# Patient Record
Sex: FEMALE | Race: WHITE | Employment: FULL TIME | URBAN - NONMETROPOLITAN AREA
[De-identification: names, ages, dates, MRNs, and addresses within clinical notes are randomized per-mention and may not be internally consistent; named-entity substitution may affect disease eponyms.]

---

## 2018-01-24 ENCOUNTER — TELEPHONE (OUTPATIENT)
Dept: FAMILY MEDICINE CLINIC | Age: 38
End: 2018-01-24

## 2018-01-29 ENCOUNTER — OFFICE VISIT (OUTPATIENT)
Dept: FAMILY MEDICINE CLINIC | Age: 38
End: 2018-01-29
Payer: COMMERCIAL

## 2018-01-29 VITALS
RESPIRATION RATE: 12 BRPM | SYSTOLIC BLOOD PRESSURE: 134 MMHG | HEIGHT: 63 IN | WEIGHT: 225 LBS | BODY MASS INDEX: 39.87 KG/M2 | TEMPERATURE: 98.1 F | DIASTOLIC BLOOD PRESSURE: 78 MMHG | HEART RATE: 80 BPM

## 2018-01-29 DIAGNOSIS — R23.2 FACIAL FLUSHING: ICD-10-CM

## 2018-01-29 DIAGNOSIS — M25.552 HIP PAIN, ACUTE, LEFT: Primary | ICD-10-CM

## 2018-01-29 PROCEDURE — 99204 OFFICE O/P NEW MOD 45 MIN: CPT | Performed by: NURSE PRACTITIONER

## 2018-01-29 RX ORDER — IBUPROFEN 800 MG/1
800 TABLET ORAL EVERY 6 HOURS PRN
Qty: 120 TABLET | Refills: 3 | Status: SHIPPED | OUTPATIENT
Start: 2018-01-29 | End: 2018-07-10 | Stop reason: ALTCHOICE

## 2018-01-29 ASSESSMENT — PATIENT HEALTH QUESTIONNAIRE - PHQ9
SUM OF ALL RESPONSES TO PHQ9 QUESTIONS 1 & 2: 0
1. LITTLE INTEREST OR PLEASURE IN DOING THINGS: 0
SUM OF ALL RESPONSES TO PHQ QUESTIONS 1-9: 0
2. FEELING DOWN, DEPRESSED OR HOPELESS: 0

## 2018-01-29 ASSESSMENT — ENCOUNTER SYMPTOMS
SINUS PRESSURE: 0
SINUS PAIN: 0
RESPIRATORY NEGATIVE: 1
ABDOMINAL DISTENTION: 0
ABDOMINAL PAIN: 0
COLOR CHANGE: 1
RHINORRHEA: 0

## 2018-01-29 NOTE — PROGRESS NOTES
Surgical History:   Procedure Laterality Date     SECTION       Family History   Problem Relation Age of Onset    Early Death Neg Hx      Social History   Substance Use Topics    Smoking status: Never Smoker    Smokeless tobacco: Never Used    Alcohol use Yes      Comment: occasional        No Known Allergies    Health Maintenance   Topic Date Due    HIV screen  1997    DTaP/Tdap/Td vaccine (1 - Tdap) 2001    Cervical cancer screen  2003    Flu vaccine (1) 2017       Subjective:      Review of Systems   Constitutional: Negative for chills, fatigue and fever. HENT: Negative for congestion, postnasal drip, rhinorrhea, sinus pain and sinus pressure. Respiratory: Negative. Cardiovascular: Negative. Gastrointestinal: Negative for abdominal distention and abdominal pain. Genitourinary: Negative for difficulty urinating and dysuria. Musculoskeletal: Positive for arthralgias and myalgias. Skin: Positive for color change (facial flushing). Neurological: Positive for headaches. Negative for dizziness and weakness. Psychiatric/Behavioral: Negative for self-injury, sleep disturbance and suicidal ideas. Objective:     /78   Pulse 80   Temp 98.1 °F (36.7 °C) (Oral)   Resp 12   Ht 5' 3\" (1.6 m)   Wt 225 lb (102.1 kg)   LMP 01/15/2018   BMI 39.86 kg/m²     Physical Exam   Constitutional: She is oriented to person, place, and time. She appears well-developed and well-nourished. No distress. overweight   HENT:   Right Ear: Hearing, tympanic membrane, external ear and ear canal normal.   Left Ear: Hearing, tympanic membrane, external ear and ear canal normal.   Nose: Nose normal.   Mouth/Throat: Oropharynx is clear and moist.   No facial flushing noted today, pt denies excessive sweating   Cardiovascular: Normal rate, regular rhythm, normal heart sounds and intact distal pulses. No murmur heard.   Pulmonary/Chest: Effort normal and breath sounds

## 2018-06-04 ENCOUNTER — TELEPHONE (OUTPATIENT)
Dept: FAMILY MEDICINE CLINIC | Age: 38
End: 2018-06-04

## 2018-06-20 ENCOUNTER — TELEPHONE (OUTPATIENT)
Dept: FAMILY MEDICINE CLINIC | Age: 38
End: 2018-06-20

## 2018-07-10 ENCOUNTER — HOSPITAL ENCOUNTER (EMERGENCY)
Age: 38
Discharge: HOME | End: 2018-07-10
Payer: COMMERCIAL

## 2018-07-10 VITALS
HEIGHT: 64 IN | TEMPERATURE: 97 F | WEIGHT: 215 LBS | HEART RATE: 95 BPM | OXYGEN SATURATION: 95 % | DIASTOLIC BLOOD PRESSURE: 76 MMHG | BODY MASS INDEX: 36.7 KG/M2 | RESPIRATION RATE: 16 BRPM | SYSTOLIC BLOOD PRESSURE: 123 MMHG

## 2018-07-10 DIAGNOSIS — J02.9 ACUTE PHARYNGITIS, UNSPECIFIED ETIOLOGY: Primary | ICD-10-CM

## 2018-07-10 LAB
GROUP A STREP CULTURE, REFLEX: NEGATIVE
REFLEX THROAT C + S: NORMAL

## 2018-07-10 PROCEDURE — 99213 OFFICE O/P EST LOW 20 MIN: CPT | Performed by: NURSE PRACTITIONER

## 2018-07-10 PROCEDURE — 87070 CULTURE OTHR SPECIMN AEROBIC: CPT

## 2018-07-10 PROCEDURE — 99214 OFFICE O/P EST MOD 30 MIN: CPT

## 2018-07-10 RX ORDER — ONDANSETRON 4 MG/1
4 TABLET, FILM COATED ORAL EVERY 8 HOURS PRN
Qty: 9 TABLET | Refills: 0 | Status: SHIPPED | OUTPATIENT
Start: 2018-07-10 | End: 2018-07-13

## 2018-07-10 RX ORDER — PSEUDOEPHEDRINE HYDROCHLORIDE 30 MG/1
30 TABLET ORAL EVERY 6 HOURS PRN
Qty: 24 TABLET | Refills: 0 | COMMUNITY
Start: 2018-07-10 | End: 2018-07-17

## 2018-07-10 ASSESSMENT — ENCOUNTER SYMPTOMS
SORE THROAT: 1
SWOLLEN GLANDS: 0
COUGH: 0
STRIDOR: 0
SHORTNESS OF BREATH: 0
RHINORRHEA: 0
SINUS PAIN: 0
CHEST TIGHTNESS: 0
WHEEZING: 0
APNEA: 0
CHOKING: 0

## 2018-07-10 ASSESSMENT — PAIN SCALES - GENERAL: PAINLEVEL_OUTOF10: 7

## 2018-07-10 ASSESSMENT — PAIN DESCRIPTION - LOCATION: LOCATION: THROAT

## 2018-07-10 NOTE — ED PROVIDER NOTES
history that includes  section and other surgical history. CURRENT MEDICATIONS       Previous Medications    ACETAMINOPHEN (TYLENOL) 325 MG TABLET    Take 650 mg by mouth every 6 hours as needed for Pain. RIVAROXABAN (XARELTO) 20 MG TABS TABLET    Take 20 mg by mouth       ALLERGIES     Patient is has No Known Allergies. FAMILY HISTORY     Patient's family history is not on file. SOCIAL HISTORY     Patient  reports that she has never smoked. She has never used smokeless tobacco. She reports that she drinks alcohol. She reports that she does not use drugs. PHYSICAL EXAM     ED TRIAGE VITALS  BP: 123/76, Temp: 97 °F (36.1 °C), Pulse: 95, Resp: 16, SpO2: 95 %  Physical Exam   Constitutional: Vital signs are normal. She appears well-developed and well-nourished. She is active. No distress. HENT:   Head: Normocephalic and atraumatic. Right Ear: Tympanic membrane is erythematous. Left Ear: Tympanic membrane is erythematous. Nose: Nose normal.   Mouth/Throat: Uvula is midline and mucous membranes are normal. Oropharyngeal exudate and posterior oropharyngeal erythema present. No posterior oropharyngeal edema or tonsillar abscesses. Eyes: Conjunctivae and EOM are normal.   Neck: Normal range of motion. Pulmonary/Chest: Effort normal.   Neurological: She is alert. Skin: She is not diaphoretic. Nursing note and vitals reviewed.       DIAGNOSTIC RESULTS   Labs:  Results for orders placed or performed during the hospital encounter of 07/10/18   Strep A culture, throat   Result Value Ref Range    REFLEX THROAT C + S INDICATED    STREP A ANTIGEN   Result Value Ref Range    GROUP A STREP CULTURE, REFLEX NEGATIVE        IMAGING:  No orders to display     URGENT CARE COURSE:     Vitals:    07/10/18 1500   BP: 123/76   Pulse: 95   Resp: 16   Temp: 97 °F (36.1 °C)   TempSrc: Temporal   SpO2: 95%   Weight: 215 lb (97.5 kg)   Height: 5' 4\" (1.626 m)       Medications - No data to

## 2018-07-12 LAB — THROAT/NOSE CULTURE: NORMAL

## 2019-04-03 ENCOUNTER — OFFICE VISIT (OUTPATIENT)
Dept: FAMILY MEDICINE CLINIC | Age: 39
End: 2019-04-03
Payer: COMMERCIAL

## 2019-04-03 VITALS
DIASTOLIC BLOOD PRESSURE: 82 MMHG | HEART RATE: 64 BPM | TEMPERATURE: 98.8 F | WEIGHT: 242 LBS | BODY MASS INDEX: 42.88 KG/M2 | RESPIRATION RATE: 14 BRPM | SYSTOLIC BLOOD PRESSURE: 134 MMHG | HEIGHT: 63 IN

## 2019-04-03 DIAGNOSIS — Z86.718 HISTORY OF DVT IN ADULTHOOD: ICD-10-CM

## 2019-04-03 DIAGNOSIS — Z86.718 HISTORY OF BLOOD CLOTS: ICD-10-CM

## 2019-04-03 DIAGNOSIS — M25.551 RIGHT HIP PAIN: Primary | ICD-10-CM

## 2019-04-03 DIAGNOSIS — G43.809 OTHER MIGRAINE WITHOUT STATUS MIGRAINOSUS, NOT INTRACTABLE: ICD-10-CM

## 2019-04-03 PROCEDURE — 99213 OFFICE O/P EST LOW 20 MIN: CPT | Performed by: NURSE PRACTITIONER

## 2019-04-03 RX ORDER — NAPROXEN 500 MG/1
500 TABLET ORAL 2 TIMES DAILY WITH MEALS
Qty: 60 TABLET | Refills: 3 | Status: SHIPPED | OUTPATIENT
Start: 2019-04-03 | End: 2019-07-10 | Stop reason: SDUPTHER

## 2019-04-03 RX ORDER — IBUPROFEN 200 MG
200 TABLET ORAL EVERY 6 HOURS PRN
COMMUNITY
End: 2019-07-10 | Stop reason: ALTCHOICE

## 2019-04-03 ASSESSMENT — PATIENT HEALTH QUESTIONNAIRE - PHQ9
SUM OF ALL RESPONSES TO PHQ QUESTIONS 1-9: 0
2. FEELING DOWN, DEPRESSED OR HOPELESS: 0
SUM OF ALL RESPONSES TO PHQ QUESTIONS 1-9: 0
SUM OF ALL RESPONSES TO PHQ9 QUESTIONS 1 & 2: 0
1. LITTLE INTEREST OR PLEASURE IN DOING THINGS: 0

## 2019-04-03 ASSESSMENT — ENCOUNTER SYMPTOMS: RESPIRATORY NEGATIVE: 1

## 2019-04-03 NOTE — PROGRESS NOTES
[address]  [address] [address]  Dept: [phone]  Dept Fax: [phone]  Loc: [phone]    Jennie Landry is a 41 y.o. Samaria Berry presents today for her medical conditions/complaints as noted below. Juantis Spurling c/o of Hip Pain (right sided hip pain for past year, worsening in the past month.  )      HPI:      Pt here with new onset right hip pain. About a year ago she suffered a torn right achilles tendon injury. She states sh has noticed right hip ain but in the last 6 weeks it has gotten worse. It hurts when she walks on it, it hurts if she lifts her leg to get in her car or shower. Denies erythema or edema of the hip area. Xenia Bushra a right leg weakness or falling down. After her achilles heel injury she suffered multiple DVTS in her right leg and a PE. She was placed on anticoagulation and had an IVC filter placed which was recently removed and anticogulation stopped. States she did have clots with both of her pregnancies and took lovenox injections. No other family members have blood clot issues. Will check coag studies. Does not smoke, does not take birth control pills. Headache    HPI:    Description of HA - frontal with a band like feeling  Symptoms have been present for 2 month(s).   Inciting event? - No  Severity - 5/10  History of migraines? -  No  Treatment tried and response - tylenol and motrin, has been taking 3 excedrin at a time    Fever over 100.5- No  Neck stiffness - No  Focal weakness/paresthesias of arm/leg - No  Nausea/vomiting - Yes - she will have nausea with them  \"Worst headache ever\" - No  Confusion - No  Photophobia/Phonophobia - Yes  Changes in vision - No         Current Outpatient Medications   Medication Sig Dispense Refill    ibuprofen (ADVIL;MOTRIN) 200 MG tablet Take 200 mg by mouth every 6 hours as needed for Pain      naproxen (NAPROSYN) 500 MG tablet Take 1 tablet by mouth 2 times daily (with meals) 60 tablet 3    acetaminophen (TYLENOL) 325 MG tablet Take 650 mg by mouth every 6 hours as needed for Pain. No current facility-administered medications for this visit. Past Medical History:   Diagnosis Date    Blood clot in vein     DVT (deep vein thrombosis) in pregnancy ([address]     Pulmonary emboli (HCC)       Past Surgical History:   Procedure Laterality Date    ANKLE SURGERY Right     achillies     SECTION      IVC FILTER INSERTION      OTHER SURGICAL HISTORY      ivc filter     Family History   Problem Relation Age of Onset    Early Death Neg Hx      Social History     Tobacco Use    Smoking status: Never Smoker    Smokeless tobacco: Never Used   Substance Use Topics    Alcohol use: Yes     Comment: occasional        No Known Allergies    Health Maintenance   Topic Date Due    Varicella Vaccine (1 of 2 - 13+ 2-dose series) 1995    HIV screen  1997    DTaP/Tdap/Td vaccine (1 - Tdap) 2001    Cervical cancer screen  2019    Flu vaccine (Season Ended) 2019       Subjective:      Review of Systems   Constitutional: Negative for chills, fatigue and fever. Respiratory: Negative. Cardiovascular: Negative. Genitourinary: Negative for difficulty urinating and dysuria. Musculoskeletal: Positive for arthralgias, gait problem and myalgias. Skin: Negative. Neurological: Positive for headaches. Negative for dizziness, facial asymmetry and light-headedness. Psychiatric/Behavioral: Negative for self-injury, sleep disturbance and suicidal ideas. Objective:      /82   Pulse 64   Temp 98.8 °F (37.1 °C) (Oral)   Resp 14   Ht 5' 3\" (1.6 m)   Wt 242 lb (109.8 kg)   LMP 2019   BMI 42.87 kg/m²      Physical Exam   Constitutional: She is oriented to person, place, and time. She appears well-developed and well-nourished. No distress. Eyes: Pupils are equal, round, and reactive to light. Conjunctivae and EOM are normal.   Cardiovascular: Normal rate, regular rhythm, normal heart sounds and intact distal pulses. No murmur heard. Pulmonary/Chest: Effort normal and breath sounds normal. No stridor. No respiratory distress. Musculoskeletal:   HIP EXAM:  Examination of the right hip shows: There is not deformity. There is not erythema. There is moderate pain with internal and external rotation. There is moderate pain with flexion and extension. There ismild pain with active SLR. ROM full range with pain. Leg lengths: Equal  Trochanteric region is not tender to palpation. Sacral Iliac is not tender to palpation. There is mild pain with weight bearing. Neurological: She is alert and oriented to person, place, and time. Skin: Skin is warm and dry. Capillary refill takes less than 2 seconds. Nursing note and vitals reviewed. Assessment/Plan:           1. Right hip pain  Moist heat and ice  Await x-ray results  - XR HIP RIGHT (2-3 VIEWS); Future  - naproxen (NAPROSYN) 500 MG tablet; Take 1 tablet by mouth 2 times daily (with meals)  Dispense: 60 tablet; Refill: 3    2. Other migraine without status migrainosus, not intractable  Stop excedrin and tylenol, monitor  - naproxen (NAPROSYN) 500 MG tablet; Take 1 tablet by mouth 2 times daily (with meals)  Dispense: 60 tablet; Refill: 3    3. History of blood clots    - Factor 5 Assay; Future  - Prothrombin Gene Mutation; Future  - Factor 7 Assay; Future  - Protein C Functional; Future  - Protein S Functional; Future    4. History of DVT in adulthood  Await coag studies  - Factor 5 Assay; Future  - Prothrombin Gene Mutation; Future  - Factor 7 Assay; Future  - Protein C Functional; Future  - Protein S Functional; Future      Return in about 3 weeks (around 4/24/2019) for follow up on hip pain.     Reccommended tobaccocessation options including pharmacologic methods, counseled great than 3 minutesduring this visit:  Yes[]  No  [] Patient given educational materials -see patient instructions. Discussed use, benefit, and side effects of prescribedmedications. All patient questions answered. Pt voiced understanding. Reviewedhealth maintenance. Instructed to continue current medications, diet and exercise. Patient agreed with treatment plan. Follow up as directed.        Electronicallysigned by REECE Woodard CNP on 4/3/2019 at 3:36 PM

## 2019-04-10 ENCOUNTER — NURSE ONLY (OUTPATIENT)
Dept: LAB | Age: 39
End: 2019-04-10

## 2019-04-10 DIAGNOSIS — Z86.718 HISTORY OF BLOOD CLOTS: ICD-10-CM

## 2019-04-10 DIAGNOSIS — Z86.718 HISTORY OF DVT IN ADULTHOOD: ICD-10-CM

## 2019-04-13 LAB
PROTEIN C FUNCTIONAL: 163 % (ref 83–168)
PROTEIN S, FUNCTIONAL: 97 % (ref 57–131)

## 2019-04-14 LAB
FACTOR V ASSAY: NORMAL
FACTOR VII ASSAY: NORMAL

## 2019-04-15 ENCOUNTER — TELEPHONE (OUTPATIENT)
Dept: FAMILY MEDICINE CLINIC | Age: 39
End: 2019-04-15

## 2019-04-15 NOTE — TELEPHONE ENCOUNTER
----- Message from REECE Edwards CNP sent at 4/15/2019  5:31 PM EDT -----  Please let pt know her clotting factors came back normal, they did not identify any blood dyscrasia. I would have her take a baby asa daily. Let me know if she has any questions.

## 2019-04-22 ENCOUNTER — HOSPITAL ENCOUNTER (OUTPATIENT)
Dept: GENERAL RADIOLOGY | Age: 39
Discharge: HOME | End: 2019-04-22
Payer: COMMERCIAL

## 2019-04-22 ENCOUNTER — HOSPITAL ENCOUNTER (OUTPATIENT)
Age: 39
Discharge: HOME | End: 2019-04-22
Payer: COMMERCIAL

## 2019-04-22 ENCOUNTER — NURSE ONLY (OUTPATIENT)
Dept: LAB | Age: 39
End: 2019-04-22

## 2019-04-22 DIAGNOSIS — M25.551 RIGHT HIP PAIN: ICD-10-CM

## 2019-04-22 PROCEDURE — 73502 X-RAY EXAM HIP UNI 2-3 VIEWS: CPT

## 2019-04-23 ENCOUNTER — TELEPHONE (OUTPATIENT)
Dept: FAMILY MEDICINE CLINIC | Age: 39
End: 2019-04-23

## 2019-04-23 NOTE — TELEPHONE ENCOUNTER
----- Message from Armani Karimi DO sent at 4/23/2019  4:48 PM EDT -----  Please let pt know that xray of R hip showed mild arthritic changes, but otherwise ok. con't with plan as discussed in clinic with Arcadio and f/u as scheduled. Let me know if questions, thanks!

## 2019-04-24 ENCOUNTER — OFFICE VISIT (OUTPATIENT)
Dept: FAMILY MEDICINE CLINIC | Age: 39
End: 2019-04-24
Payer: COMMERCIAL

## 2019-04-24 ENCOUNTER — NURSE ONLY (OUTPATIENT)
Dept: LAB | Age: 39
End: 2019-04-24

## 2019-04-24 VITALS
TEMPERATURE: 97.9 F | WEIGHT: 244 LBS | DIASTOLIC BLOOD PRESSURE: 84 MMHG | HEART RATE: 78 BPM | HEIGHT: 64 IN | SYSTOLIC BLOOD PRESSURE: 126 MMHG | BODY MASS INDEX: 41.66 KG/M2 | RESPIRATION RATE: 14 BRPM

## 2019-04-24 DIAGNOSIS — R23.2 FACIAL FLUSHING: ICD-10-CM

## 2019-04-24 DIAGNOSIS — R63.5 WEIGHT GAIN: ICD-10-CM

## 2019-04-24 DIAGNOSIS — M25.551 RIGHT HIP PAIN: Primary | ICD-10-CM

## 2019-04-24 LAB
T4 FREE: 1 NG/DL (ref 0.93–1.76)
TSH SERPL DL<=0.05 MIU/L-ACNC: 1.9 UIU/ML (ref 0.4–4.2)

## 2019-04-24 PROCEDURE — 99213 OFFICE O/P EST LOW 20 MIN: CPT | Performed by: NURSE PRACTITIONER

## 2019-04-24 RX ORDER — AZELAIC ACID 0.15 G/G
1 GEL TOPICAL SEE ADMIN INSTRUCTIONS
Qty: 1 TUBE | Refills: 2 | Status: SHIPPED | OUTPATIENT
Start: 2019-04-24 | End: 2019-05-10 | Stop reason: SDUPTHER

## 2019-04-24 ASSESSMENT — ENCOUNTER SYMPTOMS: RESPIRATORY NEGATIVE: 1

## 2019-04-24 NOTE — PROGRESS NOTES
[address]  [address] [address]  Dept: [phone]  Dept Fax: [phone]  Loc: [phone]    Maeve Ko is a 41 y.o. Betty Beatrice presents today for her medical conditions/complaints as noted below. Amaury Iyer c/o of Hip Pain (improving, going to the gym)      HPI:      Pt presents for follow up of her right hip pain. Pt states she has been taking the naprosyn for her pain, she states the pain has lessened, it used to be 8/10 and now is 3/10. She has also been going to the gym, she states it does still ache at times after standign on it all day. We did review her x-ray it is noted she has some mild bone spurring of the acetabulum. I did disucss this with her, she delicnes an ortho referral at this time. She is going to try and work on weight loss, states since her achilles surgery she has gained weight. Her mother and grandmother had thyroid issues, she will get labs for this. We did discuss diet and exercise and weight loss. She also notes her face gets flushed when she drinks or if she gets hot or sits too long. She does have some pimple son her face. Current Outpatient Medications   Medication Sig Dispense Refill    Azelaic Acid 15 % GEL Apply 1 applicator topically See Admin Instructions 1 Tube 2    naproxen (NAPROSYN) 500 MG tablet Take 1 tablet by mouth 2 times daily (with meals) 60 tablet 3    ibuprofen (ADVIL;MOTRIN) 200 MG tablet Take 200 mg by mouth every 6 hours as needed for Pain      acetaminophen (TYLENOL) 325 MG tablet Take 650 mg by mouth every 6 hours as needed for Pain. No current facility-administered medications for this visit.            Past Medical History:   Diagnosis Date    Blood clot in vein     DVT (deep vein thrombosis) in pregnancy ([address]     Pulmonary emboli (HCC)       Past Surgical History:   Procedure Laterality Date    ANKLE SURGERY Right achillies     SECTION      IVC FILTER INSERTION      OTHER SURGICAL HISTORY      ivc filter     Family History   Problem Relation Age of Onset    Early Death Neg Hx      Social History     Tobacco Use    Smoking status: Never Smoker    Smokeless tobacco: Never Used   Substance Use Topics    Alcohol use: Yes     Comment: occasional        No Known Allergies    Health Maintenance   Topic Date Due    HIV screen  1997    DTaP/Tdap/Td vaccine (1 - Tdap) 2001    Cervical cancer screen  2019    Flu vaccine (Season Ended) 2019    Pneumococcal 0-64 years Vaccine  Aged Out    Varicella Vaccine  Discontinued       Subjective:      Review of Systems   Respiratory: Negative. Cardiovascular: Negative. Endocrine:        Facial flushing   Musculoskeletal: Positive for arthralgias and myalgias. Skin: Negative. Neurological: Negative for dizziness, weakness and headaches. Objective:      /84   Pulse 78   Temp 97.9 °F (36.6 °C) (Oral)   Resp 14   Ht 5' 3.5\" (1.613 m)   Wt 244 lb (110.7 kg)   LMP 2019   BMI 42.54 kg/m²      Physical Exam   Constitutional: She appears well-developed and well-nourished. No distress. Musculoskeletal:   HIP EXAM:  Examination of the right hip shows: There is not deformity. There is not erythema. There is mild pain with internal and external rotation. There is mild pain with flexion and extension. There isnone pain with active SLR. ROM full range with pain. Leg lengths: Equal  Trochanteric region is not tender to palpation. Sacral Iliac is  tender to palpation. There is mild pain with weight bearing. Skin: Skin is warm and dry. Capillary refill takes less than 2 seconds. Noted to have sparsely dispersed pimples of right cheek   Nursing note and vitals reviewed. Assessment/Plan:           1. Right hip pain  Ice if needed  Continue with diet and exercise, naprosyn call if it gets worse.   Will send to orthopedics. 2. Weight gain  We spent 10 minutes of visit discussing decreasing calories and decreasing portion sizes. Await labs. - TSH without Reflex; Future  - T4, Free; Future    3. Facial flushing    - TSH without Reflex; Future  - T4, Free; Future  - Azelaic Acid 15 % GEL; Apply 1 applicator topically See Admin Instructions  Dispense: 1 Tube; Refill: 2      Return if symptoms worsen or fail to improve. Reccommended tobaccocessation options including pharmacologic methods, counseled great than 3 minutesduring this visit:  Yes[]  No  []       Patient given educational materials -see patient instructions. Discussed use, benefit, and side effects of prescribedmedications. All patient questions answered. Pt voiced understanding. Reviewedhealth maintenance. Instructed to continue current medications, diet and exercise. Patient agreed with treatment plan. Follow up as directed.        Electronicallysigned by REECE Magaña CNP on 4/24/2019 at 9:38 AM

## 2019-04-25 ENCOUNTER — TELEPHONE (OUTPATIENT)
Dept: FAMILY MEDICINE CLINIC | Age: 39
End: 2019-04-25

## 2019-04-25 LAB — PROTHROMBIN G20210A MUTATION: NORMAL

## 2019-04-29 ENCOUNTER — TELEPHONE (OUTPATIENT)
Dept: FAMILY MEDICINE CLINIC | Age: 39
End: 2019-04-29

## 2019-04-29 DIAGNOSIS — I82.90 THROMBOSIS: Primary | ICD-10-CM

## 2019-04-29 DIAGNOSIS — Z71.83 ENCOUNTER FOR NONPROCREATIVE GENETIC COUNSELING: ICD-10-CM

## 2019-04-29 NOTE — TELEPHONE ENCOUNTER
----- Message from REECE Marino CNP sent at 4/29/2019 12:46 PM EDT -----  Please let pt know her labs  Did not identify any specific clotting abnormalities but some of the tests were inconclusive. I would recommend she follow up with a  for further evaluation. I can place a referral if she is agreeable.

## 2019-05-10 DIAGNOSIS — R23.2 FACIAL FLUSHING: ICD-10-CM

## 2019-05-13 ENCOUNTER — TELEPHONE (OUTPATIENT)
Dept: FAMILY MEDICINE CLINIC | Age: 39
End: 2019-05-13

## 2019-05-13 RX ORDER — AZELAIC ACID 0.15 G/G
1 GEL TOPICAL SEE ADMIN INSTRUCTIONS
Qty: 1 TUBE | Refills: 2 | Status: SHIPPED | OUTPATIENT
Start: 2019-05-13 | End: 2019-11-18

## 2019-06-11 ENCOUNTER — TELEPHONE (OUTPATIENT)
Dept: FAMILY MEDICINE CLINIC | Age: 39
End: 2019-06-11

## 2019-06-11 NOTE — TELEPHONE ENCOUNTER
AMB EXTERNAL REFERRAL TO GENETICS-  Per Anders Reid @ [address] [place] [address] ([phone]), genetics needs family hx returned to them before scheduling. The packet was mailed to her on 5/2/19. Attempted to reach patient but was only able to leave a general c/b message. When patient calls, please let forward her to [facility] or ask her if she has returned the packet. If none was received, she needs to call [facility] at the phone number above.

## 2019-06-20 ENCOUNTER — TELEPHONE (OUTPATIENT)
Dept: FAMILY MEDICINE CLINIC | Age: 39
End: 2019-06-20

## 2019-06-20 NOTE — TELEPHONE ENCOUNTER
AMB EXTERNAL REFERRAL TO GENETICS-    (telephone encounter from 6/11/19 gives hx to this referral)    Per rep @ [facility] referral line, no packet has been returned. If patient has rec'd the family information packet and doesn't know how to complete them, she can call [phone] option 2 and ask to be transferred to genetic dept for assistance. F/U with call to patient - left detailed message. Did she receive the information packet from [address] [place] [address] and return it to them? If so, when was it mailed?

## 2019-06-24 ENCOUNTER — TELEPHONE (OUTPATIENT)
Dept: FAMILY MEDICINE CLINIC | Age: 39
End: 2019-06-24

## 2019-06-24 NOTE — TELEPHONE ENCOUNTER
AMB EXTERNAL REFERRAL TO GENETICS-    Referral placed on 4/29/19. On 4/29/19 - Referral faxed to [facility] and scanned into the chart; Patient notified that referral was faxed and given f/u number if she doesn't hear from them to call and get an appt. ([phone])    On 5/7/19 - Attempted to send JAYLEEN but not successful. [employer] in the computer but no address, ph# or fax# is listed. On 6/11/19 - Per Dennis Balderas @ [facility] ([phone]), genetics needs family hx returned to them before scheduling. The packet was mailed to her on 5/2/19. Attempted to reach patient but was only able to leave a general c/b message. When patient calls, please let forward her to [facility] or ask her if she has returned the packet. If none was received, she needs to call [facility] at the phone number above. On 6/18/19 - [facility] has yet to receive packet - no appt scheduled. On Per rep @ [facility] referral line, no packet has been returned. If patient has rec'd the family information packet and doesn't know how to complete them, she can call [phone] option 2 and ask to be transferred to genetic dept for assistance. F/U with call to patient - left detailed message. Did she receive the information packet from [facility] and return it to them? If so, when was it mailed? No response and over 30 day loop. Ok to cancel?

## 2019-07-10 ENCOUNTER — OFFICE VISIT (OUTPATIENT)
Dept: FAMILY MEDICINE CLINIC | Age: 39
End: 2019-07-10
Payer: COMMERCIAL

## 2019-07-10 ENCOUNTER — TELEPHONE (OUTPATIENT)
Dept: FAMILY MEDICINE CLINIC | Age: 39
End: 2019-07-10

## 2019-07-10 VITALS
SYSTOLIC BLOOD PRESSURE: 140 MMHG | HEIGHT: 63 IN | DIASTOLIC BLOOD PRESSURE: 78 MMHG | RESPIRATION RATE: 14 BRPM | TEMPERATURE: 98.6 F | BODY MASS INDEX: 43.94 KG/M2 | HEART RATE: 96 BPM | WEIGHT: 248 LBS

## 2019-07-10 DIAGNOSIS — G43.809 OTHER MIGRAINE WITHOUT STATUS MIGRAINOSUS, NOT INTRACTABLE: ICD-10-CM

## 2019-07-10 DIAGNOSIS — M25.551 RIGHT HIP PAIN: ICD-10-CM

## 2019-07-10 DIAGNOSIS — G43.809 OTHER MIGRAINE WITHOUT STATUS MIGRAINOSUS, NOT INTRACTABLE: Primary | ICD-10-CM

## 2019-07-10 DIAGNOSIS — R20.8 DECREASED SENSATION: ICD-10-CM

## 2019-07-10 PROCEDURE — 96372 THER/PROPH/DIAG INJ SC/IM: CPT | Performed by: NURSE PRACTITIONER

## 2019-07-10 PROCEDURE — 99213 OFFICE O/P EST LOW 20 MIN: CPT | Performed by: NURSE PRACTITIONER

## 2019-07-10 RX ORDER — NAPROXEN 500 MG/1
500 TABLET ORAL 2 TIMES DAILY WITH MEALS
Qty: 60 TABLET | Refills: 3 | Status: SHIPPED | OUTPATIENT
Start: 2019-07-10 | End: 2019-11-18

## 2019-07-10 RX ORDER — KETOROLAC TROMETHAMINE 30 MG/ML
30 INJECTION, SOLUTION INTRAMUSCULAR; INTRAVENOUS ONCE
Status: SHIPPED | OUTPATIENT
Start: 2019-07-10 | End: 2019-07-15

## 2019-07-10 RX ORDER — KETOROLAC TROMETHAMINE 30 MG/ML
30 INJECTION, SOLUTION INTRAMUSCULAR; INTRAVENOUS ONCE
Status: COMPLETED | OUTPATIENT
Start: 2019-07-10 | End: 2019-07-10

## 2019-07-10 RX ORDER — METHYLPREDNISOLONE ACETATE 40 MG/ML
80 INJECTION, SUSPENSION INTRA-ARTICULAR; INTRALESIONAL; INTRAMUSCULAR; SOFT TISSUE ONCE
Status: COMPLETED | OUTPATIENT
Start: 2019-07-10 | End: 2019-07-10

## 2019-07-10 RX ADMIN — METHYLPREDNISOLONE ACETATE 80 MG: 40 INJECTION, SUSPENSION INTRA-ARTICULAR; INTRALESIONAL; INTRAMUSCULAR; SOFT TISSUE at 17:02

## 2019-07-10 RX ADMIN — KETOROLAC TROMETHAMINE 30 MG: 30 INJECTION, SOLUTION INTRAMUSCULAR; INTRAVENOUS at 17:03

## 2019-07-10 NOTE — PROGRESS NOTES
Medication(s) given during visit:    Administrations This Visit     ketorolac (TORADOL) injection 30 mg     Admin Date  07/10/2019  17:03 Action  Given Dose  30 mg Route  Intramuscular Site  Deltoid Left Administered By  Moira Weber LPN    Ordering Provider:  REECE Ascencio CNP    NDC:  3058-7133-57    Lot#:  [id number]    :  Toribio Bautista    Patient Supplied?:  No          methylPREDNISolone acetate (DEPO-MEDROL) injection 80 mg     Admin Date  07/10/2019  17:02 Action  Given Dose  80 mg Route  Intramuscular Site  Deltoid Left Administered By  Moira Weber LPN    Ordering Provider:  REECE Ascencio CNP    NDC:  0094-9305-28    Lot#:  [id number]    :  [employer]    Patient Supplied?:  No                Patient instructed to remain in clinic for 20 minutes after injection and was advised to report any adverse reaction to me immediately. Visit Information    Have you changed or started any medications since your last visit including any over-the-counter medicines, vitamins, or herbal medicines? no   Are you having any side effects from any of your medications? -  no  Have you stopped taking any of your medications? Is so, why? -  no    Have you seen any other physician or provider since your last visit? No  Have you had any other diagnostic tests since your last visit? Yes - Records Obtained  Have you been seen in the emergency room and/or had an admission to a hospital since we last saw you? No  Have you had your routine dental cleaning in the past 6 months? no    Have you activated your [employer] account? If not, what are your barriers? Yes     Patient Care Team:  REECE Ascencio CNP as PCP - General (Family Nurse Practitioner)  REECE Ascencio CNP as PCP - [facility] Provider    Medical History Review  Past Medical, Family, and Social History     Defer to provider.

## 2019-07-11 ENCOUNTER — TELEPHONE (OUTPATIENT)
Dept: FAMILY MEDICINE CLINIC | Age: 39
End: 2019-07-11

## 2019-07-15 ENCOUNTER — TELEPHONE (OUTPATIENT)
Dept: FAMILY MEDICINE CLINIC | Age: 39
End: 2019-07-15

## 2019-07-15 ENCOUNTER — HOSPITAL ENCOUNTER (OUTPATIENT)
Dept: MRI IMAGING | Age: 39
Discharge: HOME | End: 2019-07-15
Payer: COMMERCIAL

## 2019-07-15 DIAGNOSIS — G43.809 OTHER MIGRAINE WITHOUT STATUS MIGRAINOSUS, NOT INTRACTABLE: ICD-10-CM

## 2019-07-15 DIAGNOSIS — J34.1 MUCOUS RETENTION CYST OF MAXILLARY SINUS: Primary | ICD-10-CM

## 2019-07-15 PROCEDURE — 70551 MRI BRAIN STEM W/O DYE: CPT

## 2019-07-15 RX ORDER — SULFAMETHOXAZOLE AND TRIMETHOPRIM 800; 160 MG/1; MG/1
1 TABLET ORAL 2 TIMES DAILY
Qty: 14 TABLET | Refills: 0 | Status: SHIPPED | OUTPATIENT
Start: 2019-07-15 | End: 2019-07-17 | Stop reason: SDUPTHER

## 2019-07-15 NOTE — TELEPHONE ENCOUNTER
Since she is having right sided headaches and the cyst is on the right side I would like her to trial an atb for 21 days to see if this helps to relieve the headache.  Have her continue the tylenol and naprosyn, have her contact me when atb is finished to update me on how she is feeling

## 2019-07-15 NOTE — TELEPHONE ENCOUNTER
----- Message from REECE Walton CNP sent at 7/15/2019  3:18 PM EDT -----  Please let pt know her mri of brain did not identify any abnormalities in the brain. Please ask how she has been feeling, it did identify a mucous retention cyst of her right sinus, but if she is not having any sinus pain or pressure I would  Not recommend any treatment of this.

## 2019-07-17 DIAGNOSIS — J34.1 MUCOUS RETENTION CYST OF MAXILLARY SINUS: ICD-10-CM

## 2019-07-17 RX ORDER — SULFAMETHOXAZOLE AND TRIMETHOPRIM 800; 160 MG/1; MG/1
1 TABLET ORAL 2 TIMES DAILY
Qty: 28 TABLET | Refills: 0 | Status: SHIPPED | OUTPATIENT
Start: 2019-07-17 | End: 2019-07-26 | Stop reason: SDUPTHER

## 2019-07-25 ENCOUNTER — TELEPHONE (OUTPATIENT)
Dept: FAMILY MEDICINE CLINIC | Age: 39
End: 2019-07-25

## 2019-07-25 DIAGNOSIS — R51.9 NONINTRACTABLE HEADACHE, UNSPECIFIED CHRONICITY PATTERN, UNSPECIFIED HEADACHE TYPE: Primary | ICD-10-CM

## 2019-07-25 RX ORDER — GABAPENTIN 100 MG/1
100 CAPSULE ORAL 2 TIMES DAILY
Qty: 180 CAPSULE | Refills: 0 | Status: SHIPPED | OUTPATIENT
Start: 2019-07-25 | End: 2019-10-22 | Stop reason: SINTOL

## 2019-07-26 ENCOUNTER — TELEPHONE (OUTPATIENT)
Dept: FAMILY MEDICINE CLINIC | Age: 39
End: 2019-07-26

## 2019-07-26 DIAGNOSIS — J34.1 MUCOUS RETENTION CYST OF MAXILLARY SINUS: ICD-10-CM

## 2019-07-26 RX ORDER — SULFAMETHOXAZOLE AND TRIMETHOPRIM 800; 160 MG/1; MG/1
1 TABLET ORAL 2 TIMES DAILY
Qty: 28 TABLET | Refills: 0 | Status: SHIPPED | OUTPATIENT
Start: 2019-07-26 | End: 2019-08-09

## 2019-07-26 NOTE — TELEPHONE ENCOUNTER
LMOM, Pt notified of referral, advised to call back for any additional questions or concerns     *Referral placed in phone 2 tray

## 2019-09-11 ENCOUNTER — E-VISIT (OUTPATIENT)
Dept: FAMILY MEDICINE CLINIC | Age: 39
End: 2019-09-11
Payer: COMMERCIAL

## 2019-09-11 DIAGNOSIS — J02.9 PHARYNGITIS, UNSPECIFIED ETIOLOGY: ICD-10-CM

## 2019-09-11 DIAGNOSIS — J01.90 ACUTE BACTERIAL SINUSITIS: Primary | ICD-10-CM

## 2019-09-11 DIAGNOSIS — B96.89 ACUTE BACTERIAL SINUSITIS: Primary | ICD-10-CM

## 2019-09-11 PROCEDURE — 99213 OFFICE O/P EST LOW 20 MIN: CPT | Performed by: NURSE PRACTITIONER

## 2019-09-11 ASSESSMENT — LIFESTYLE VARIABLES: SMOKING_STATUS: NO, I'VE NEVER SMOKED

## 2019-09-12 ENCOUNTER — TELEPHONE (OUTPATIENT)
Dept: FAMILY MEDICINE CLINIC | Age: 39
End: 2019-09-12

## 2019-09-12 RX ORDER — FLUTICASONE PROPIONATE 50 MCG
1 SPRAY, SUSPENSION (ML) NASAL DAILY
Qty: 2 BOTTLE | Refills: 1 | Status: SHIPPED | OUTPATIENT
Start: 2019-09-12 | End: 2019-10-22

## 2019-09-12 RX ORDER — FLUCONAZOLE 150 MG/1
150 TABLET ORAL
Qty: 2 TABLET | Refills: 0 | Status: SHIPPED | OUTPATIENT
Start: 2019-09-12 | End: 2019-09-18

## 2019-09-12 RX ORDER — AMOXICILLIN 500 MG/1
500 TABLET, FILM COATED ORAL 3 TIMES DAILY
Qty: 30 TABLET | Refills: 0 | Status: SHIPPED | OUTPATIENT
Start: 2019-09-12 | End: 2019-10-15

## 2019-10-15 ENCOUNTER — HOSPITAL ENCOUNTER (EMERGENCY)
Age: 39
Discharge: HOME | End: 2019-10-16
Attending: EMERGENCY MEDICINE
Payer: COMMERCIAL

## 2019-10-15 ENCOUNTER — APPOINTMENT (OUTPATIENT)
Dept: CT IMAGING | Age: 39
End: 2019-10-15
Payer: COMMERCIAL

## 2019-10-15 DIAGNOSIS — R10.9 ABDOMINAL PAIN OF UNKNOWN CAUSE: ICD-10-CM

## 2019-10-15 DIAGNOSIS — R10.11 ABDOMINAL PAIN, RIGHT UPPER QUADRANT: Primary | ICD-10-CM

## 2019-10-15 LAB
ALBUMIN SERPL-MCNC: 3.8 G/DL (ref 3.5–5.1)
ALP BLD-CCNC: 87 U/L (ref 38–126)
ALT SERPL-CCNC: 33 U/L (ref 11–66)
ANION GAP SERPL CALCULATED.3IONS-SCNC: 11 MEQ/L (ref 8–16)
AST SERPL-CCNC: 22 U/L (ref 5–40)
BASOPHILS # BLD: 0.2 %
BASOPHILS ABSOLUTE: 0 THOU/MM3 (ref 0–0.1)
BILIRUB SERPL-MCNC: 0.2 MG/DL (ref 0.3–1.2)
BILIRUBIN URINE: NEGATIVE
BLOOD, URINE: ABNORMAL
BUN BLDV-MCNC: 13 MG/DL (ref 7–22)
CALCIUM SERPL-MCNC: 9.2 MG/DL (ref 8.5–10.5)
CHARACTER, URINE: CLEAR
CHLORIDE BLD-SCNC: 104 MEQ/L (ref 98–111)
CO2: 26 MEQ/L (ref 23–33)
COLOR: YELLOW
CREAT SERPL-MCNC: 0.8 MG/DL (ref 0.4–1.2)
EOSINOPHIL # BLD: 1 %
EOSINOPHILS ABSOLUTE: 0.1 THOU/MM3 (ref 0–0.4)
ERYTHROCYTE [DISTWIDTH] IN BLOOD BY AUTOMATED COUNT: 13.4 % (ref 11.5–14.5)
ERYTHROCYTE [DISTWIDTH] IN BLOOD BY AUTOMATED COUNT: 45.2 FL (ref 35–45)
GFR SERPL CREATININE-BSD FRML MDRD: 81 ML/MIN/1.73M2
GLUCOSE BLD-MCNC: 113 MG/DL (ref 70–108)
GLUCOSE, URINE: NEGATIVE MG/DL
HCT VFR BLD CALC: 41 % (ref 37–47)
HEMOGLOBIN: 13.2 GM/DL (ref 12–16)
IMMATURE GRANS (ABS): 0.07 THOU/MM3 (ref 0–0.07)
IMMATURE GRANULOCYTES: 0.6 %
KETONES, URINE: NEGATIVE
LEUKOCYTES, UA: NEGATIVE
LIPASE: 20 U/L (ref 5.6–51.3)
LYMPHOCYTES # BLD: 25.5 %
LYMPHOCYTES ABSOLUTE: 3.2 THOU/MM3 (ref 1–4.8)
MCH RBC QN AUTO: 29.4 PG (ref 26–33)
MCHC RBC AUTO-ENTMCNC: 32.2 GM/DL (ref 32.2–35.5)
MCV RBC AUTO: 91.3 FL (ref 81–99)
MONOCYTES # BLD: 4.9 %
MONOCYTES ABSOLUTE: 0.6 THOU/MM3 (ref 0.4–1.3)
NITRATE, UA: NEGATIVE
NUCLEATED RED BLOOD CELLS: 0 /100 WBC
OSMOLALITY CALCULATION: 282.2 MOSMOL/KG (ref 275–300)
PH UA: 5.5 (ref 5–9)
PLATELET # BLD: 316 THOU/MM3 (ref 130–400)
PMV BLD AUTO: 8.8 FL (ref 9.4–12.4)
POTASSIUM REFLEX MAGNESIUM: 3.7 MEQ/L (ref 3.5–5.2)
PREGNANCY, SERUM: NEGATIVE
PROTEIN UA: ABNORMAL MG/DL
RBC # BLD: 4.49 MILL/MM3 (ref 4.2–5.4)
REFLEX TO URINE C & S: ABNORMAL
SEG NEUTROPHILS: 67.8 %
SEGMENTED NEUTROPHILS ABSOLUTE COUNT: 8.5 THOU/MM3 (ref 1.8–7.7)
SODIUM BLD-SCNC: 141 MEQ/L (ref 135–145)
SPECIFIC GRAVITY UA: >= 1.03 (ref 1–1.03)
TOTAL PROTEIN: 7.3 G/DL (ref 6.1–8)
UROBILINOGEN, URINE: 0.2 EU/DL (ref 0–1)
WBC # BLD: 12.5 THOU/MM3 (ref 4.8–10.8)

## 2019-10-15 PROCEDURE — 85025 COMPLETE CBC W/AUTO DIFF WBC: CPT

## 2019-10-15 PROCEDURE — 74177 CT ABD & PELVIS W/CONTRAST: CPT

## 2019-10-15 PROCEDURE — 99215 OFFICE O/P EST HI 40 MIN: CPT

## 2019-10-15 PROCEDURE — 96375 TX/PRO/DX INJ NEW DRUG ADDON: CPT

## 2019-10-15 PROCEDURE — 6360000002 HC RX W HCPCS: Performed by: EMERGENCY MEDICINE

## 2019-10-15 PROCEDURE — 84703 CHORIONIC GONADOTROPIN ASSAY: CPT

## 2019-10-15 PROCEDURE — 6370000000 HC RX 637 (ALT 250 FOR IP): Performed by: EMERGENCY MEDICINE

## 2019-10-15 PROCEDURE — 36415 COLL VENOUS BLD VENIPUNCTURE: CPT

## 2019-10-15 PROCEDURE — 81003 URINALYSIS AUTO W/O SCOPE: CPT

## 2019-10-15 PROCEDURE — 83690 ASSAY OF LIPASE: CPT

## 2019-10-15 PROCEDURE — 99284 EMERGENCY DEPT VISIT MOD MDM: CPT

## 2019-10-15 PROCEDURE — 96374 THER/PROPH/DIAG INJ IV PUSH: CPT

## 2019-10-15 PROCEDURE — 6360000004 HC RX CONTRAST MEDICATION: Performed by: EMERGENCY MEDICINE

## 2019-10-15 PROCEDURE — 80053 COMPREHEN METABOLIC PANEL: CPT

## 2019-10-15 RX ORDER — METOCLOPRAMIDE HYDROCHLORIDE 5 MG/ML
10 INJECTION INTRAMUSCULAR; INTRAVENOUS ONCE
Status: COMPLETED | OUTPATIENT
Start: 2019-10-15 | End: 2019-10-15

## 2019-10-15 RX ORDER — DICYCLOMINE HYDROCHLORIDE 10 MG/1
10 CAPSULE ORAL EVERY 6 HOURS PRN
Qty: 10 CAPSULE | Refills: 0 | Status: SHIPPED | OUTPATIENT
Start: 2019-10-15 | End: 2019-11-18

## 2019-10-15 RX ORDER — DICYCLOMINE HYDROCHLORIDE 10 MG/1
20 CAPSULE ORAL ONCE
Status: COMPLETED | OUTPATIENT
Start: 2019-10-15 | End: 2019-10-15

## 2019-10-15 RX ORDER — ONDANSETRON 4 MG/1
4 TABLET, ORALLY DISINTEGRATING ORAL EVERY 8 HOURS PRN
Qty: 10 TABLET | Refills: 0 | Status: SHIPPED | OUTPATIENT
Start: 2019-10-15 | End: 2019-10-22 | Stop reason: SDUPTHER

## 2019-10-15 RX ORDER — KETOROLAC TROMETHAMINE 30 MG/ML
30 INJECTION, SOLUTION INTRAMUSCULAR; INTRAVENOUS ONCE
Status: COMPLETED | OUTPATIENT
Start: 2019-10-15 | End: 2019-10-15

## 2019-10-15 RX ADMIN — METOCLOPRAMIDE 10 MG: 5 INJECTION, SOLUTION INTRAMUSCULAR; INTRAVENOUS at 20:12

## 2019-10-15 RX ADMIN — KETOROLAC TROMETHAMINE 30 MG: 30 INJECTION, SOLUTION INTRAMUSCULAR at 20:12

## 2019-10-15 RX ADMIN — DICYCLOMINE HYDROCHLORIDE 20 MG: 10 CAPSULE ORAL at 20:12

## 2019-10-15 RX ADMIN — IOPAMIDOL 80 ML: 755 INJECTION, SOLUTION INTRAVENOUS at 21:30

## 2019-10-15 ASSESSMENT — ENCOUNTER SYMPTOMS
COUGH: 0
VOMITING: 0
ABDOMINAL PAIN: 1
BACK PAIN: 1
SORE THROAT: 0
NAUSEA: 1
SHORTNESS OF BREATH: 0

## 2019-10-15 ASSESSMENT — PAIN SCALES - GENERAL
PAINLEVEL_OUTOF10: 9
PAINLEVEL_OUTOF10: 5
PAINLEVEL_OUTOF10: 2
PAINLEVEL_OUTOF10: 5

## 2019-10-15 ASSESSMENT — PAIN DESCRIPTION - LOCATION
LOCATION: ABDOMEN
LOCATION: ABDOMEN;FLANK

## 2019-10-15 ASSESSMENT — PAIN DESCRIPTION - PAIN TYPE: TYPE: ACUTE PAIN

## 2019-10-15 ASSESSMENT — PAIN DESCRIPTION - ORIENTATION: ORIENTATION: RIGHT

## 2019-10-16 VITALS
WEIGHT: 230 LBS | RESPIRATION RATE: 16 BRPM | HEIGHT: 63 IN | DIASTOLIC BLOOD PRESSURE: 76 MMHG | HEART RATE: 75 BPM | TEMPERATURE: 98 F | OXYGEN SATURATION: 95 % | BODY MASS INDEX: 40.75 KG/M2 | SYSTOLIC BLOOD PRESSURE: 144 MMHG

## 2019-10-17 ENCOUNTER — TELEPHONE (OUTPATIENT)
Dept: FAMILY MEDICINE CLINIC | Age: 39
End: 2019-10-17

## 2019-10-17 DIAGNOSIS — R51.9 CHRONIC NONINTRACTABLE HEADACHE, UNSPECIFIED HEADACHE TYPE: Primary | ICD-10-CM

## 2019-10-17 DIAGNOSIS — G89.29 CHRONIC NONINTRACTABLE HEADACHE, UNSPECIFIED HEADACHE TYPE: Primary | ICD-10-CM

## 2019-10-17 RX ORDER — TOPIRAMATE 25 MG/1
25 TABLET ORAL NIGHTLY
Qty: 30 TABLET | Refills: 3 | Status: SHIPPED | OUTPATIENT
Start: 2019-10-17 | End: 2019-12-09 | Stop reason: SDUPTHER

## 2019-10-22 ENCOUNTER — OFFICE VISIT (OUTPATIENT)
Dept: FAMILY MEDICINE CLINIC | Age: 39
End: 2019-10-22
Payer: COMMERCIAL

## 2019-10-22 VITALS
RESPIRATION RATE: 16 BRPM | TEMPERATURE: 97.9 F | HEIGHT: 63 IN | DIASTOLIC BLOOD PRESSURE: 90 MMHG | BODY MASS INDEX: 43.59 KG/M2 | HEART RATE: 98 BPM | SYSTOLIC BLOOD PRESSURE: 138 MMHG | WEIGHT: 246 LBS

## 2019-10-22 DIAGNOSIS — R11.0 NAUSEA: ICD-10-CM

## 2019-10-22 DIAGNOSIS — R16.0 HEPATOMEGALY: ICD-10-CM

## 2019-10-22 DIAGNOSIS — I10 ESSENTIAL HYPERTENSION: ICD-10-CM

## 2019-10-22 DIAGNOSIS — R10.11 RUQ PAIN: Primary | ICD-10-CM

## 2019-10-22 PROCEDURE — 99214 OFFICE O/P EST MOD 30 MIN: CPT | Performed by: NURSE PRACTITIONER

## 2019-10-22 RX ORDER — LISINOPRIL 5 MG/1
5 TABLET ORAL DAILY
Qty: 30 TABLET | Refills: 2 | Status: SHIPPED | OUTPATIENT
Start: 2019-10-22 | End: 2019-11-06 | Stop reason: SDUPTHER

## 2019-10-22 RX ORDER — ONDANSETRON 4 MG/1
4 TABLET, ORALLY DISINTEGRATING ORAL EVERY 8 HOURS PRN
Qty: 20 TABLET | Refills: 0 | Status: SHIPPED | OUTPATIENT
Start: 2019-10-22 | End: 2019-11-18

## 2019-10-22 ASSESSMENT — ENCOUNTER SYMPTOMS
ABDOMINAL PAIN: 1
CONSTIPATION: 0
RECTAL PAIN: 0
VOMITING: 1
ABDOMINAL DISTENTION: 0
RESPIRATORY NEGATIVE: 1
NAUSEA: 1
DIARRHEA: 0

## 2019-10-23 ENCOUNTER — TELEPHONE (OUTPATIENT)
Dept: FAMILY MEDICINE CLINIC | Age: 39
End: 2019-10-23

## 2019-10-31 ENCOUNTER — HOSPITAL ENCOUNTER (OUTPATIENT)
Dept: NUCLEAR MEDICINE | Age: 39
Discharge: HOME | End: 2019-10-31
Payer: COMMERCIAL

## 2019-10-31 ENCOUNTER — TELEPHONE (OUTPATIENT)
Dept: FAMILY MEDICINE CLINIC | Age: 39
End: 2019-10-31

## 2019-10-31 VITALS — BODY MASS INDEX: 42.51 KG/M2 | WEIGHT: 240 LBS

## 2019-10-31 DIAGNOSIS — R11.0 NAUSEA: ICD-10-CM

## 2019-10-31 DIAGNOSIS — R10.11 RUQ PAIN: ICD-10-CM

## 2019-10-31 PROCEDURE — 6360000004 HC RX CONTRAST MEDICATION: Performed by: NURSE PRACTITIONER

## 2019-10-31 PROCEDURE — 2580000003 HC RX 258: Performed by: NURSE PRACTITIONER

## 2019-10-31 PROCEDURE — 3430000000 HC RX DIAGNOSTIC RADIOPHARMACEUTICAL: Performed by: NURSE PRACTITIONER

## 2019-10-31 PROCEDURE — 2709999900 HC NON-CHARGEABLE SUPPLY

## 2019-10-31 PROCEDURE — A9537 TC99M MEBROFENIN: HCPCS | Performed by: NURSE PRACTITIONER

## 2019-10-31 PROCEDURE — 78227 HEPATOBIL SYST IMAGE W/DRUG: CPT

## 2019-10-31 RX ADMIN — SODIUM CHLORIDE 2.18 MCG: 9 INJECTION, SOLUTION INTRAVENOUS at 10:16

## 2019-10-31 RX ADMIN — Medication 8.2 MILLICURIE: at 09:10

## 2019-11-04 ENCOUNTER — HOSPITAL ENCOUNTER (OUTPATIENT)
Dept: ULTRASOUND IMAGING | Age: 39
Discharge: HOME | End: 2019-11-04
Payer: COMMERCIAL

## 2019-11-04 ENCOUNTER — NURSE ONLY (OUTPATIENT)
Dept: LAB | Age: 39
End: 2019-11-04

## 2019-11-04 ENCOUNTER — TELEPHONE (OUTPATIENT)
Dept: FAMILY MEDICINE CLINIC | Age: 39
End: 2019-11-04

## 2019-11-04 DIAGNOSIS — R11.0 NAUSEA: ICD-10-CM

## 2019-11-04 DIAGNOSIS — R10.11 RUQ PAIN: ICD-10-CM

## 2019-11-04 DIAGNOSIS — R10.11 RUQ PAIN: Primary | ICD-10-CM

## 2019-11-04 DIAGNOSIS — R16.0 HEPATOMEGALY: ICD-10-CM

## 2019-11-04 LAB
AMYLASE: 31 U/L (ref 20–104)
LIPASE: 18.3 U/L (ref 5.6–51.3)

## 2019-11-04 PROCEDURE — 76705 ECHO EXAM OF ABDOMEN: CPT

## 2019-11-05 RX ORDER — OMEPRAZOLE 20 MG/1
20 CAPSULE, DELAYED RELEASE ORAL
Qty: 30 CAPSULE | Refills: 5 | Status: SHIPPED | OUTPATIENT
Start: 2019-11-05 | End: 2020-11-13

## 2019-11-06 ENCOUNTER — TELEPHONE (OUTPATIENT)
Dept: FAMILY MEDICINE CLINIC | Age: 39
End: 2019-11-06

## 2019-11-06 ENCOUNTER — NURSE ONLY (OUTPATIENT)
Dept: FAMILY MEDICINE CLINIC | Age: 39
End: 2019-11-06

## 2019-11-06 VITALS — HEART RATE: 68 BPM | SYSTOLIC BLOOD PRESSURE: 122 MMHG | DIASTOLIC BLOOD PRESSURE: 60 MMHG

## 2019-11-06 DIAGNOSIS — I10 ESSENTIAL HYPERTENSION: ICD-10-CM

## 2019-11-06 DIAGNOSIS — Z01.30 BP CHECK: Primary | ICD-10-CM

## 2019-11-06 RX ORDER — LISINOPRIL 5 MG/1
5 TABLET ORAL DAILY
Qty: 90 TABLET | Refills: 1 | Status: SHIPPED | OUTPATIENT
Start: 2019-11-06 | End: 2020-12-01 | Stop reason: SDUPTHER

## 2019-11-13 ENCOUNTER — OFFICE VISIT (OUTPATIENT)
Dept: FAMILY MEDICINE CLINIC | Age: 39
End: 2019-11-13
Payer: COMMERCIAL

## 2019-11-13 VITALS
WEIGHT: 239.8 LBS | SYSTOLIC BLOOD PRESSURE: 116 MMHG | TEMPERATURE: 97.4 F | BODY MASS INDEX: 42.48 KG/M2 | OXYGEN SATURATION: 98 % | DIASTOLIC BLOOD PRESSURE: 64 MMHG | HEART RATE: 88 BPM | RESPIRATION RATE: 12 BRPM

## 2019-11-13 DIAGNOSIS — J04.0 LARYNGITIS: ICD-10-CM

## 2019-11-13 DIAGNOSIS — J20.9 ACUTE BRONCHITIS, UNSPECIFIED ORGANISM: Primary | ICD-10-CM

## 2019-11-13 PROCEDURE — 99213 OFFICE O/P EST LOW 20 MIN: CPT | Performed by: NURSE PRACTITIONER

## 2019-11-13 RX ORDER — AMOXICILLIN 500 MG/1
500 TABLET, FILM COATED ORAL 3 TIMES DAILY
Qty: 30 TABLET | Refills: 0 | Status: SHIPPED | OUTPATIENT
Start: 2019-11-13 | End: 2020-02-18 | Stop reason: ALTCHOICE

## 2019-11-13 RX ORDER — ALBUTEROL SULFATE 90 UG/1
2 AEROSOL, METERED RESPIRATORY (INHALATION) 4 TIMES DAILY PRN
Qty: 3 INHALER | Refills: 1 | Status: SHIPPED | OUTPATIENT
Start: 2019-11-13 | End: 2022-06-29

## 2019-11-13 RX ORDER — GUAIFENESIN 600 MG/1
600 TABLET, EXTENDED RELEASE ORAL 2 TIMES DAILY
Qty: 30 TABLET | Refills: 0 | Status: SHIPPED | OUTPATIENT
Start: 2019-11-13 | End: 2019-11-18

## 2019-11-13 RX ORDER — PREDNISONE 20 MG/1
TABLET ORAL
Qty: 18 TABLET | Refills: 0 | Status: SHIPPED | OUTPATIENT
Start: 2019-11-13 | End: 2020-02-18 | Stop reason: ALTCHOICE

## 2019-11-15 ENCOUNTER — HOSPITAL ENCOUNTER (OUTPATIENT)
Age: 39
Discharge: HOME | End: 2019-11-15
Payer: COMMERCIAL

## 2019-11-15 LAB
BASOPHILS # BLD: 0.2 %
BASOPHILS ABSOLUTE: 0 THOU/MM3 (ref 0–0.1)
EKG ATRIAL RATE: 72 BPM
EKG P AXIS: 71 DEGREES
EKG P-R INTERVAL: 154 MS
EKG Q-T INTERVAL: 376 MS
EKG QRS DURATION: 94 MS
EKG QTC CALCULATION (BAZETT): 411 MS
EKG R AXIS: 73 DEGREES
EKG T AXIS: 2 DEGREES
EKG VENTRICULAR RATE: 72 BPM
EOSINOPHIL # BLD: 0.1 %
EOSINOPHILS ABSOLUTE: 0 THOU/MM3 (ref 0–0.4)
ERYTHROCYTE [DISTWIDTH] IN BLOOD BY AUTOMATED COUNT: 13.8 % (ref 11.5–14.5)
ERYTHROCYTE [DISTWIDTH] IN BLOOD BY AUTOMATED COUNT: 46.5 FL (ref 35–45)
HCT VFR BLD CALC: 40.4 % (ref 37–47)
HEMOGLOBIN: 13.4 GM/DL (ref 12–16)
IMMATURE GRANS (ABS): 0.12 THOU/MM3 (ref 0–0.07)
IMMATURE GRANULOCYTES: 0.9 %
LYMPHOCYTES # BLD: 27 %
LYMPHOCYTES ABSOLUTE: 3.5 THOU/MM3 (ref 1–4.8)
MCH RBC QN AUTO: 30.3 PG (ref 26–33)
MCHC RBC AUTO-ENTMCNC: 33.2 GM/DL (ref 32.2–35.5)
MCV RBC AUTO: 91.4 FL (ref 81–99)
MONOCYTES # BLD: 4.7 %
MONOCYTES ABSOLUTE: 0.6 THOU/MM3 (ref 0.4–1.3)
NUCLEATED RED BLOOD CELLS: 0 /100 WBC
PLATELET # BLD: 374 THOU/MM3 (ref 130–400)
PMV BLD AUTO: 9.4 FL (ref 9.4–12.4)
POTASSIUM SERPL-SCNC: 4.2 MEQ/L (ref 3.5–5.2)
RBC # BLD: 4.42 MILL/MM3 (ref 4.2–5.4)
SEG NEUTROPHILS: 67.1 %
SEGMENTED NEUTROPHILS ABSOLUTE COUNT: 8.8 THOU/MM3 (ref 1.8–7.7)
WBC # BLD: 13.1 THOU/MM3 (ref 4.8–10.8)

## 2019-11-15 PROCEDURE — 84132 ASSAY OF SERUM POTASSIUM: CPT

## 2019-11-15 PROCEDURE — 36415 COLL VENOUS BLD VENIPUNCTURE: CPT

## 2019-11-15 PROCEDURE — 85025 COMPLETE CBC W/AUTO DIFF WBC: CPT

## 2019-11-15 PROCEDURE — 93005 ELECTROCARDIOGRAM TRACING: CPT | Performed by: OBSTETRICS & GYNECOLOGY

## 2019-11-16 PROCEDURE — 93010 ELECTROCARDIOGRAM REPORT: CPT | Performed by: INTERNAL MEDICINE

## 2019-11-20 ENCOUNTER — ANESTHESIA EVENT (OUTPATIENT)
Dept: OPERATING ROOM | Age: 39
End: 2019-11-20
Payer: COMMERCIAL

## 2019-11-21 ENCOUNTER — ANESTHESIA (OUTPATIENT)
Dept: OPERATING ROOM | Age: 39
End: 2019-11-21
Payer: COMMERCIAL

## 2019-11-21 ENCOUNTER — HOSPITAL ENCOUNTER (OUTPATIENT)
Age: 39
Setting detail: OUTPATIENT SURGERY
Discharge: HOME | End: 2019-11-21
Attending: OBSTETRICS & GYNECOLOGY | Admitting: OBSTETRICS & GYNECOLOGY
Payer: COMMERCIAL

## 2019-11-21 VITALS
HEIGHT: 63 IN | WEIGHT: 239 LBS | HEART RATE: 70 BPM | TEMPERATURE: 97.1 F | BODY MASS INDEX: 42.35 KG/M2 | DIASTOLIC BLOOD PRESSURE: 57 MMHG | RESPIRATION RATE: 15 BRPM | OXYGEN SATURATION: 93 % | SYSTOLIC BLOOD PRESSURE: 105 MMHG

## 2019-11-21 VITALS
RESPIRATION RATE: 14 BRPM | OXYGEN SATURATION: 99 % | DIASTOLIC BLOOD PRESSURE: 45 MMHG | SYSTOLIC BLOOD PRESSURE: 86 MMHG

## 2019-11-21 DIAGNOSIS — N92.4 EXCESSIVE BLEEDING IN PREMENOPAUSAL PERIOD: Primary | ICD-10-CM

## 2019-11-21 PROBLEM — N92.0 MENORRHAGIA: Status: ACTIVE | Noted: 2019-11-21

## 2019-11-21 LAB — PREGNANCY, URINE: NEGATIVE

## 2019-11-21 PROCEDURE — 2580000003 HC RX 258: Performed by: OBSTETRICS & GYNECOLOGY

## 2019-11-21 PROCEDURE — 7100000001 HC PACU RECOVERY - ADDTL 15 MIN: Performed by: OBSTETRICS & GYNECOLOGY

## 2019-11-21 PROCEDURE — 3600000013 HC SURGERY LEVEL 3 ADDTL 15MIN: Performed by: OBSTETRICS & GYNECOLOGY

## 2019-11-21 PROCEDURE — 2500000003 HC RX 250 WO HCPCS

## 2019-11-21 PROCEDURE — 2709999900 HC NON-CHARGEABLE SUPPLY: Performed by: OBSTETRICS & GYNECOLOGY

## 2019-11-21 PROCEDURE — 7100000011 HC PHASE II RECOVERY - ADDTL 15 MIN: Performed by: OBSTETRICS & GYNECOLOGY

## 2019-11-21 PROCEDURE — 88305 TISSUE EXAM BY PATHOLOGIST: CPT

## 2019-11-21 PROCEDURE — 7100000000 HC PACU RECOVERY - FIRST 15 MIN: Performed by: OBSTETRICS & GYNECOLOGY

## 2019-11-21 PROCEDURE — 6370000000 HC RX 637 (ALT 250 FOR IP): Performed by: ANESTHESIOLOGY

## 2019-11-21 PROCEDURE — 6360000002 HC RX W HCPCS

## 2019-11-21 PROCEDURE — 3700000000 HC ANESTHESIA ATTENDED CARE: Performed by: OBSTETRICS & GYNECOLOGY

## 2019-11-21 PROCEDURE — 7100000010 HC PHASE II RECOVERY - FIRST 15 MIN: Performed by: OBSTETRICS & GYNECOLOGY

## 2019-11-21 PROCEDURE — 3700000001 HC ADD 15 MINUTES (ANESTHESIA): Performed by: OBSTETRICS & GYNECOLOGY

## 2019-11-21 PROCEDURE — 81025 URINE PREGNANCY TEST: CPT

## 2019-11-21 PROCEDURE — 3600000003 HC SURGERY LEVEL 3 BASE: Performed by: OBSTETRICS & GYNECOLOGY

## 2019-11-21 RX ORDER — LIDOCAINE HYDROCHLORIDE 20 MG/ML
INJECTION, SOLUTION EPIDURAL; INFILTRATION; INTRACAUDAL; PERINEURAL PRN
Status: DISCONTINUED | OUTPATIENT
Start: 2019-11-21 | End: 2019-11-21 | Stop reason: SDUPTHER

## 2019-11-21 RX ORDER — SODIUM CHLORIDE 0.9 % (FLUSH) 0.9 %
10 SYRINGE (ML) INJECTION EVERY 12 HOURS SCHEDULED
Status: DISCONTINUED | OUTPATIENT
Start: 2019-11-21 | End: 2019-11-21 | Stop reason: HOSPADM

## 2019-11-21 RX ORDER — OXYCODONE HYDROCHLORIDE AND ACETAMINOPHEN 5; 325 MG/1; MG/1
1 TABLET ORAL EVERY 4 HOURS PRN
Status: DISCONTINUED | OUTPATIENT
Start: 2019-11-21 | End: 2019-11-21 | Stop reason: HOSPADM

## 2019-11-21 RX ORDER — SODIUM CHLORIDE 0.9 % (FLUSH) 0.9 %
10 SYRINGE (ML) INJECTION PRN
Status: DISCONTINUED | OUTPATIENT
Start: 2019-11-21 | End: 2019-11-21 | Stop reason: HOSPADM

## 2019-11-21 RX ORDER — DEXAMETHASONE SODIUM PHOSPHATE 4 MG/ML
INJECTION, SOLUTION INTRA-ARTICULAR; INTRALESIONAL; INTRAMUSCULAR; INTRAVENOUS; SOFT TISSUE PRN
Status: DISCONTINUED | OUTPATIENT
Start: 2019-11-21 | End: 2019-11-21 | Stop reason: SDUPTHER

## 2019-11-21 RX ORDER — DOCUSATE SODIUM 100 MG/1
100 CAPSULE, LIQUID FILLED ORAL 2 TIMES DAILY
Status: DISCONTINUED | OUTPATIENT
Start: 2019-11-21 | End: 2019-11-21 | Stop reason: HOSPADM

## 2019-11-21 RX ORDER — ONDANSETRON 2 MG/ML
4 INJECTION INTRAMUSCULAR; INTRAVENOUS EVERY 6 HOURS PRN
Status: DISCONTINUED | OUTPATIENT
Start: 2019-11-21 | End: 2019-11-21 | Stop reason: HOSPADM

## 2019-11-21 RX ORDER — PROPOFOL 10 MG/ML
INJECTION, EMULSION INTRAVENOUS PRN
Status: DISCONTINUED | OUTPATIENT
Start: 2019-11-21 | End: 2019-11-21 | Stop reason: SDUPTHER

## 2019-11-21 RX ORDER — OXYCODONE HYDROCHLORIDE AND ACETAMINOPHEN 5; 325 MG/1; MG/1
2 TABLET ORAL EVERY 4 HOURS PRN
Status: DISCONTINUED | OUTPATIENT
Start: 2019-11-21 | End: 2019-11-21 | Stop reason: HOSPADM

## 2019-11-21 RX ORDER — MORPHINE SULFATE 2 MG/ML
2 INJECTION, SOLUTION INTRAMUSCULAR; INTRAVENOUS EVERY 5 MIN PRN
Status: DISCONTINUED | OUTPATIENT
Start: 2019-11-21 | End: 2019-11-21 | Stop reason: HOSPADM

## 2019-11-21 RX ORDER — KETOROLAC TROMETHAMINE 30 MG/ML
INJECTION, SOLUTION INTRAMUSCULAR; INTRAVENOUS PRN
Status: DISCONTINUED | OUTPATIENT
Start: 2019-11-21 | End: 2019-11-21 | Stop reason: SDUPTHER

## 2019-11-21 RX ORDER — FENTANYL CITRATE 50 UG/ML
INJECTION, SOLUTION INTRAMUSCULAR; INTRAVENOUS PRN
Status: DISCONTINUED | OUTPATIENT
Start: 2019-11-21 | End: 2019-11-21 | Stop reason: SDUPTHER

## 2019-11-21 RX ORDER — FENTANYL CITRATE 50 UG/ML
50 INJECTION, SOLUTION INTRAMUSCULAR; INTRAVENOUS EVERY 5 MIN PRN
Status: DISCONTINUED | OUTPATIENT
Start: 2019-11-21 | End: 2019-11-21 | Stop reason: HOSPADM

## 2019-11-21 RX ORDER — MIDAZOLAM HYDROCHLORIDE 1 MG/ML
INJECTION INTRAMUSCULAR; INTRAVENOUS PRN
Status: DISCONTINUED | OUTPATIENT
Start: 2019-11-21 | End: 2019-11-21 | Stop reason: SDUPTHER

## 2019-11-21 RX ORDER — ONDANSETRON 2 MG/ML
INJECTION INTRAMUSCULAR; INTRAVENOUS PRN
Status: DISCONTINUED | OUTPATIENT
Start: 2019-11-21 | End: 2019-11-21 | Stop reason: SDUPTHER

## 2019-11-21 RX ORDER — HYDROCODONE BITARTRATE AND ACETAMINOPHEN 5; 325 MG/1; MG/1
1 TABLET ORAL EVERY 6 HOURS PRN
Qty: 10 TABLET | Refills: 0 | Status: SHIPPED | OUTPATIENT
Start: 2019-11-21 | End: 2019-11-24

## 2019-11-21 RX ORDER — SODIUM CHLORIDE 9 MG/ML
INJECTION, SOLUTION INTRAVENOUS CONTINUOUS
Status: DISCONTINUED | OUTPATIENT
Start: 2019-11-21 | End: 2019-11-21 | Stop reason: HOSPADM

## 2019-11-21 RX ORDER — IBUPROFEN 800 MG/1
800 TABLET ORAL EVERY 8 HOURS PRN
Status: DISCONTINUED | OUTPATIENT
Start: 2019-11-21 | End: 2019-11-21 | Stop reason: HOSPADM

## 2019-11-21 RX ORDER — SCOLOPAMINE TRANSDERMAL SYSTEM 1 MG/1
1 PATCH, EXTENDED RELEASE TRANSDERMAL
Status: DISCONTINUED | OUTPATIENT
Start: 2019-11-21 | End: 2019-11-21 | Stop reason: HOSPADM

## 2019-11-21 RX ADMIN — ONDANSETRON HYDROCHLORIDE 4 MG: 4 INJECTION, SOLUTION INTRAMUSCULAR; INTRAVENOUS at 10:26

## 2019-11-21 RX ADMIN — PROPOFOL 20 MG: 10 INJECTION, EMULSION INTRAVENOUS at 10:25

## 2019-11-21 RX ADMIN — LIDOCAINE HYDROCHLORIDE 80 MG: 20 INJECTION, SOLUTION EPIDURAL; INFILTRATION; INTRACAUDAL; PERINEURAL at 10:21

## 2019-11-21 RX ADMIN — DEXAMETHASONE SODIUM PHOSPHATE 8 MG: 4 INJECTION, SOLUTION INTRAMUSCULAR; INTRAVENOUS at 10:26

## 2019-11-21 RX ADMIN — SODIUM CHLORIDE: 9 INJECTION, SOLUTION INTRAVENOUS at 10:12

## 2019-11-21 RX ADMIN — FENTANYL CITRATE 50 MCG: 50 INJECTION INTRAMUSCULAR; INTRAVENOUS at 10:25

## 2019-11-21 RX ADMIN — FENTANYL CITRATE 50 MCG: 50 INJECTION INTRAMUSCULAR; INTRAVENOUS at 10:30

## 2019-11-21 RX ADMIN — PROPOFOL 20 MG: 10 INJECTION, EMULSION INTRAVENOUS at 10:30

## 2019-11-21 RX ADMIN — MIDAZOLAM HYDROCHLORIDE 2 MG: 1 INJECTION, SOLUTION INTRAMUSCULAR; INTRAVENOUS at 10:21

## 2019-11-21 RX ADMIN — PROPOFOL 180 MG: 10 INJECTION, EMULSION INTRAVENOUS at 10:21

## 2019-11-21 RX ADMIN — KETOROLAC TROMETHAMINE 30 MG: 30 INJECTION, SOLUTION INTRAMUSCULAR; INTRAVENOUS at 10:39

## 2019-11-21 ASSESSMENT — PULMONARY FUNCTION TESTS
PIF_VALUE: 3
PIF_VALUE: 4
PIF_VALUE: 3
PIF_VALUE: 0
PIF_VALUE: 3
PIF_VALUE: 5
PIF_VALUE: 2
PIF_VALUE: 1
PIF_VALUE: 4
PIF_VALUE: 3
PIF_VALUE: 5
PIF_VALUE: 3
PIF_VALUE: 3
PIF_VALUE: 4
PIF_VALUE: 0
PIF_VALUE: 4
PIF_VALUE: 5
PIF_VALUE: 3
PIF_VALUE: 2
PIF_VALUE: 3
PIF_VALUE: 4
PIF_VALUE: 3
PIF_VALUE: 3
PIF_VALUE: 4
PIF_VALUE: 2
PIF_VALUE: 1
PIF_VALUE: 4
PIF_VALUE: 3
PIF_VALUE: 3

## 2019-11-21 ASSESSMENT — PAIN - FUNCTIONAL ASSESSMENT: PAIN_FUNCTIONAL_ASSESSMENT: 0-10

## 2019-11-21 ASSESSMENT — PAIN SCALES - GENERAL: PAINLEVEL_OUTOF10: 0

## 2019-12-02 ENCOUNTER — E-VISIT (OUTPATIENT)
Dept: FAMILY MEDICINE CLINIC | Age: 39
End: 2019-12-02
Payer: COMMERCIAL

## 2019-12-02 DIAGNOSIS — J01.81 OTHER ACUTE RECURRENT SINUSITIS: Primary | ICD-10-CM

## 2019-12-02 DIAGNOSIS — J30.89 NON-SEASONAL ALLERGIC RHINITIS DUE TO OTHER ALLERGIC TRIGGER: ICD-10-CM

## 2019-12-02 PROCEDURE — 99213 OFFICE O/P EST LOW 20 MIN: CPT | Performed by: NURSE PRACTITIONER

## 2019-12-02 RX ORDER — AZITHROMYCIN 250 MG/1
250 TABLET, FILM COATED ORAL SEE ADMIN INSTRUCTIONS
Qty: 6 TABLET | Refills: 0 | Status: SHIPPED | OUTPATIENT
Start: 2019-12-02 | End: 2019-12-07

## 2019-12-02 RX ORDER — LORATADINE 10 MG/1
10 TABLET ORAL DAILY
Qty: 90 TABLET | Refills: 3 | Status: SHIPPED | OUTPATIENT
Start: 2019-12-02 | End: 2020-05-11

## 2019-12-02 RX ORDER — FLUTICASONE PROPIONATE 50 MCG
1 SPRAY, SUSPENSION (ML) NASAL DAILY
Qty: 2 BOTTLE | Refills: 1 | Status: SHIPPED | OUTPATIENT
Start: 2019-12-02 | End: 2022-06-29

## 2019-12-02 ASSESSMENT — LIFESTYLE VARIABLES: SMOKING_STATUS: NO, I'VE NEVER SMOKED

## 2019-12-09 ENCOUNTER — TELEPHONE (OUTPATIENT)
Dept: FAMILY MEDICINE CLINIC | Age: 39
End: 2019-12-09

## 2019-12-09 DIAGNOSIS — G89.29 CHRONIC NONINTRACTABLE HEADACHE, UNSPECIFIED HEADACHE TYPE: ICD-10-CM

## 2019-12-09 DIAGNOSIS — R51.9 CHRONIC NONINTRACTABLE HEADACHE, UNSPECIFIED HEADACHE TYPE: ICD-10-CM

## 2019-12-09 RX ORDER — TOPIRAMATE 25 MG/1
25 TABLET ORAL 2 TIMES DAILY
Qty: 60 TABLET | Refills: 3 | Status: SHIPPED | OUTPATIENT
Start: 2019-12-09 | End: 2020-03-24 | Stop reason: SINTOL

## 2020-02-11 ENCOUNTER — NURSE ONLY (OUTPATIENT)
Dept: LAB | Age: 40
End: 2020-02-11

## 2020-02-11 LAB
ALBUMIN SERPL-MCNC: 4.3 G/DL (ref 3.5–5.1)
ALP BLD-CCNC: 85 U/L (ref 38–126)
ALT SERPL-CCNC: 27 U/L (ref 11–66)
ANION GAP SERPL CALCULATED.3IONS-SCNC: 13 MEQ/L (ref 8–16)
AST SERPL-CCNC: 20 U/L (ref 5–40)
BASOPHILS # BLD: 0.3 %
BASOPHILS ABSOLUTE: 0 THOU/MM3 (ref 0–0.1)
BILIRUB SERPL-MCNC: 0.3 MG/DL (ref 0.3–1.2)
BILIRUBIN DIRECT: < 0.2 MG/DL (ref 0–0.3)
BUN BLDV-MCNC: 11 MG/DL (ref 7–22)
CALCIUM SERPL-MCNC: 9.1 MG/DL (ref 8.5–10.5)
CHLORIDE BLD-SCNC: 105 MEQ/L (ref 98–111)
CO2: 21 MEQ/L (ref 23–33)
CREAT SERPL-MCNC: 0.7 MG/DL (ref 0.4–1.2)
EOSINOPHIL # BLD: 1.2 %
EOSINOPHILS ABSOLUTE: 0.1 THOU/MM3 (ref 0–0.4)
ERYTHROCYTE [DISTWIDTH] IN BLOOD BY AUTOMATED COUNT: 13.4 % (ref 11.5–14.5)
ERYTHROCYTE [DISTWIDTH] IN BLOOD BY AUTOMATED COUNT: 45.6 FL (ref 35–45)
FERRITIN: 47 NG/ML (ref 10–291)
GAMMA GLUTAMYL TRANSFERASE: 33 U/L (ref 8–69)
GFR SERPL CREATININE-BSD FRML MDRD: > 90 ML/MIN/1.73M2
GLUCOSE BLD-MCNC: 101 MG/DL (ref 70–108)
HAV IGM SER IA-ACNC: NEGATIVE
HCT VFR BLD CALC: 43.6 % (ref 37–47)
HEMOGLOBIN: 13.9 GM/DL (ref 12–16)
HEPATITIS B CORE IGM ANTIBODY: NEGATIVE
HEPATITIS B SURFACE ANTIGEN: NEGATIVE
HEPATITIS C ANTIBODY: NEGATIVE
IMMATURE GRANS (ABS): 0.02 THOU/MM3 (ref 0–0.07)
IMMATURE GRANULOCYTES: 0.3 %
INR BLD: 1 (ref 0.85–1.13)
LYMPHOCYTES # BLD: 33.5 %
LYMPHOCYTES ABSOLUTE: 2.6 THOU/MM3 (ref 1–4.8)
MCH RBC QN AUTO: 29.5 PG (ref 26–33)
MCHC RBC AUTO-ENTMCNC: 31.9 GM/DL (ref 32.2–35.5)
MCV RBC AUTO: 92.6 FL (ref 81–99)
MONOCYTES # BLD: 4.1 %
MONOCYTES ABSOLUTE: 0.3 THOU/MM3 (ref 0.4–1.3)
NUCLEATED RED BLOOD CELLS: 0 /100 WBC
PLATELET # BLD: 334 THOU/MM3 (ref 130–400)
PMV BLD AUTO: 9.4 FL (ref 9.4–12.4)
POTASSIUM SERPL-SCNC: 4.3 MEQ/L (ref 3.5–5.2)
RBC # BLD: 4.71 MILL/MM3 (ref 4.2–5.4)
SEG NEUTROPHILS: 60.6 %
SEGMENTED NEUTROPHILS ABSOLUTE COUNT: 4.7 THOU/MM3 (ref 1.8–7.7)
SODIUM BLD-SCNC: 139 MEQ/L (ref 135–145)
TOTAL PROTEIN: 7.7 G/DL (ref 6.1–8)
WBC # BLD: 7.7 THOU/MM3 (ref 4.8–10.8)

## 2020-02-14 LAB
ANA SCREEN: NORMAL
CERULOPLASMIN: 36 MG/DL (ref 17–54)
F-ACTIN AB IGG: 10 UNITS (ref 0–19)
MITOCHONDRIAL ANTIBODY: 13.4 UNITS (ref 0–20)

## 2020-02-19 ENCOUNTER — OFFICE VISIT (OUTPATIENT)
Dept: FAMILY MEDICINE CLINIC | Age: 40
End: 2020-02-19
Payer: COMMERCIAL

## 2020-02-19 VITALS
DIASTOLIC BLOOD PRESSURE: 70 MMHG | WEIGHT: 240 LBS | TEMPERATURE: 97.8 F | BODY MASS INDEX: 40.97 KG/M2 | SYSTOLIC BLOOD PRESSURE: 102 MMHG | RESPIRATION RATE: 14 BRPM | HEIGHT: 64 IN | HEART RATE: 71 BPM

## 2020-02-19 PROCEDURE — 99214 OFFICE O/P EST MOD 30 MIN: CPT | Performed by: NURSE PRACTITIONER

## 2020-02-19 RX ORDER — MECLIZINE HCL 12.5 MG/1
12.5 TABLET ORAL 3 TIMES DAILY PRN
Qty: 30 TABLET | Refills: 0 | Status: SHIPPED | OUTPATIENT
Start: 2020-02-19 | End: 2020-02-29

## 2020-02-19 ASSESSMENT — PATIENT HEALTH QUESTIONNAIRE - PHQ9
2. FEELING DOWN, DEPRESSED OR HOPELESS: 0
1. LITTLE INTEREST OR PLEASURE IN DOING THINGS: 0
SUM OF ALL RESPONSES TO PHQ QUESTIONS 1-9: 0
SUM OF ALL RESPONSES TO PHQ QUESTIONS 1-9: 0
SUM OF ALL RESPONSES TO PHQ9 QUESTIONS 1 & 2: 0

## 2020-02-19 NOTE — PROGRESS NOTES
Liss Diaz is a 42 y.o. female for evaluation of Dizziness (x1mo (pt spinning with head movement)) and Memory Loss (increasing - cant remember people/leaving stove on/car running)        Dizziness    HPI:      Symptoms have been present for 3 week(s). Patient describes symptoms as vertigo - patient feels like she is spinning  Symptoms are intermittent, occurring about every occurring with head position changes. lasting up to 30  minutes per episode  Inciting event prior to symptoms starting? No  Provoking factos- changing positions with her head, looking up or down  Alleviating factors - rest and laying flat  Frequency of symptoms - has been happening most days. Duration of events - 30 minutes at a time. Associated Symptoms -  vertigo, Lightheaded, near syncope and Vomiting  History of trauma - No   Nausea and vomiting - Yes - she did with 1 bad episode  History of Meniers disease, BPPV, or labrinthitis - No  Medications that can cause vertigo -  She is on topamax  History of alcohol consumption - No      She has also noticed some confusion and memory issues since starting the topamax, will decrease topamax to night time only and monitor her memory and confusion. Did have a normal MRI of brain 7/19. States the topamax has helped to decrease her headaches,   Physical Exam    /70   Pulse 71   Temp 97.8 °F (36.6 °C) (Oral)   Resp 14   Ht 5' 4\" (1.626 m)   Wt 240 lb (108.9 kg)   LMP 02/05/2020   BMI 41.20 kg/m²   Appearance: alert, well appearing, and in no distress, normal appearing weight and well hydrated. Eye exam - pupils equal and reactive, extraocular eye movements intact. Ears - bilateral TM's and external ear canals normal.  Neck exam - supple, no significant adenopathy, normal ROM  CVS exam: normal rate, regular rhythm, normal S1, S2, no murmurs, rubs, clicks or gallops.     CNIII-XII  Intact ad normal.   Lungs: clear to auscultation, no wheezes, rales or rhonchi, symmetric air

## 2020-02-19 NOTE — PATIENT INSTRUCTIONS
loss of movement in your face, arm, or leg, especially on only one side of your body. ? Sudden vision changes. ? Sudden trouble speaking. ? Sudden confusion or trouble understanding simple statements. ? Sudden problems with walking or balance. ? A sudden, severe headache that is different from past headaches.    Call your doctor now or seek immediate medical care if:    · You have new or worse nausea and vomiting.     · You have new symptoms such as hearing loss or roaring in your ears.    Watch closely for changes in your health, and be sure to contact your doctor if:    · You are not getting better as expected.     · Your vertigo gets worse. Where can you learn more? Go to https://EnerG2pepiceweb.[employer]. org and sign in to your [employer] account. Enter  in the netFactor box to learn more about \"Benign Paroxysmal Positional Vertigo (BPPV): Care Instructions. \"     If you do not have an account, please click on the \"Sign Up Now\" link. Current as of: July 28, 2019  Content Version: 12.3  © 0485-5000 [employer]. Care instructions adapted under license by [facility] ([facility]). If you have questions about a medical condition or this instruction, always ask your healthcare professional. [place] [address] any warranty or liability for your use of this information. Patient Education        Epley Maneuver for Vertigo: Exercises  Your Care Instructions  The Epley Maneuver is a series of movements your doctor may use to treat your vertigo. Here are the steps for the exercises. Your doctor or physical therapist will guide you through the movements. A single 10- to 15-minute session often is all that's needed. Crystal debris (canaliths) cause the vertigo. When your head is moved into different positions, the debris moves freely. This may cause your symptoms to stop. How to do the exercises  Step 1   1. You will sit on the doctor's exam table.  Your legs will be out adapted under license by [facility] ([facility]). If you have questions about a medical condition or this instruction, always ask your healthcare professional. [address] any warranty or liability for your use of this information.        Decrease topamax to night time only  Start antivert as needed for dizziness

## 2020-02-20 ENCOUNTER — HOSPITAL ENCOUNTER (OUTPATIENT)
Dept: ULTRASOUND IMAGING | Age: 40
Discharge: HOME | End: 2020-02-20
Payer: COMMERCIAL

## 2020-02-20 ENCOUNTER — HOSPITAL ENCOUNTER (OUTPATIENT)
Age: 40
Discharge: HOME | End: 2020-02-20
Payer: COMMERCIAL

## 2020-02-20 LAB
ALBUMIN SERPL-MCNC: 4 G/DL (ref 3.5–5.1)
ALP BLD-CCNC: 80 U/L (ref 38–126)
ALT SERPL-CCNC: 37 U/L (ref 11–66)
AMMONIA: 22 UMOL/L (ref 11–60)
ANION GAP SERPL CALCULATED.3IONS-SCNC: 13 MEQ/L (ref 8–16)
AST SERPL-CCNC: 26 U/L (ref 5–40)
BILIRUB SERPL-MCNC: 0.5 MG/DL (ref 0.3–1.2)
BUN BLDV-MCNC: 15 MG/DL (ref 7–22)
CALCIUM SERPL-MCNC: 8.8 MG/DL (ref 8.5–10.5)
CHLORIDE BLD-SCNC: 104 MEQ/L (ref 98–111)
CO2: 22 MEQ/L (ref 23–33)
CREAT SERPL-MCNC: 0.7 MG/DL (ref 0.4–1.2)
ERYTHROCYTE [DISTWIDTH] IN BLOOD BY AUTOMATED COUNT: 13.5 % (ref 11.5–14.5)
ERYTHROCYTE [DISTWIDTH] IN BLOOD BY AUTOMATED COUNT: 46.5 FL (ref 35–45)
FERRITIN: 51 NG/ML (ref 10–291)
GAMMA GLUTAMYL TRANSFERASE: 36 U/L (ref 8–69)
GFR SERPL CREATININE-BSD FRML MDRD: > 90 ML/MIN/1.73M2
GLUCOSE BLD-MCNC: 112 MG/DL (ref 70–108)
HCT VFR BLD CALC: 43.6 % (ref 37–47)
HEMOGLOBIN: 14.1 GM/DL (ref 12–16)
INR BLD: 1.04 (ref 0.85–1.13)
MCH RBC QN AUTO: 30.2 PG (ref 26–33)
MCHC RBC AUTO-ENTMCNC: 32.3 GM/DL (ref 32.2–35.5)
MCV RBC AUTO: 93.4 FL (ref 81–99)
PLATELET # BLD: 348 THOU/MM3 (ref 130–400)
PMV BLD AUTO: 9.1 FL (ref 9.4–12.4)
POTASSIUM SERPL-SCNC: 4.3 MEQ/L (ref 3.5–5.2)
RBC # BLD: 4.67 MILL/MM3 (ref 4.2–5.4)
SODIUM BLD-SCNC: 139 MEQ/L (ref 135–145)
TOTAL PROTEIN: 7.5 G/DL (ref 6.1–8)
WBC # BLD: 8.1 THOU/MM3 (ref 4.8–10.8)

## 2020-02-20 PROCEDURE — 85027 COMPLETE CBC AUTOMATED: CPT

## 2020-02-20 PROCEDURE — 86003 ALLG SPEC IGE CRUDE XTRC EA: CPT

## 2020-02-20 PROCEDURE — 82977 ASSAY OF GGT: CPT

## 2020-02-20 PROCEDURE — 80053 COMPREHEN METABOLIC PANEL: CPT

## 2020-02-20 PROCEDURE — 76705 ECHO EXAM OF ABDOMEN: CPT

## 2020-02-20 PROCEDURE — 82728 ASSAY OF FERRITIN: CPT

## 2020-02-20 PROCEDURE — 82140 ASSAY OF AMMONIA: CPT

## 2020-02-20 PROCEDURE — 85610 PROTHROMBIN TIME: CPT

## 2020-02-20 PROCEDURE — 36415 COLL VENOUS BLD VENIPUNCTURE: CPT

## 2020-02-25 LAB
ALLERGEN BARLEY IGE: < 0.1 KU/L
ALLERGEN BEEF: < 0.1 KU/L
ALLERGEN CABBAGE IGE: < 0.1 KU/L
ALLERGEN CARROT IGE: < 0.1 KU/L
ALLERGEN CHICKEN IGE: < 0.1 KU/L
ALLERGEN CODFISH IGE: < 0.1 KU/L
ALLERGEN CORN IGE: < 0.1 KU/L
ALLERGEN CRAB IGE: < 0.1 KU/L
ALLERGEN EGG WHITE IGE: < 0.1 KU/L
ALLERGEN GRAPE IGE: < 0.1 KU/L
ALLERGEN INTERPRETATION/SCORE: NORMAL
ALLERGEN LETTUCE IGE: < 0.1 KU/L
ALLERGEN MILK IGE: < 0.1 KU/L
ALLERGEN NAVY BEAN: < 0.1 KU/L
ALLERGEN OAT: < 0.1 KU/L
ALLERGEN ORANGE IGE: < 0.1 KU/L
ALLERGEN PEPPER C. ANNUUM IGE: < 0.1 KU/L
ALLERGEN PORK: < 0.1 KU/L
ALLERGEN POTATO IGE: < 0.1 KU/L
ALLERGEN RICE IGE: < 0.1 KU/L
ALLERGEN RYE IGE: < 0.1 KU/L
ALLERGEN SHRIMP IGE: < 0.1 KU/L
ALLERGEN SOYBEAN IGE: < 0.1 KU/L
ALLERGEN TOMATO IGE: < 0.1 KU/L
ALLERGEN TUNA IGE: < 0.1 KU/L
ALLERGEN WHEAT IGE: < 0.1 KU/L

## 2020-03-10 LAB — CELIAC SEROLOGY: NORMAL

## 2020-03-11 LAB — TISSUE TRANSGLUTAMINASE IGA: < 2 U/ML (ref 0–3)

## 2020-03-16 ENCOUNTER — TELEPHONE (OUTPATIENT)
Dept: FAMILY MEDICINE CLINIC | Age: 40
End: 2020-03-16

## 2020-03-23 ENCOUNTER — PATIENT MESSAGE (OUTPATIENT)
Dept: FAMILY MEDICINE CLINIC | Age: 40
End: 2020-03-23

## 2020-03-23 NOTE — TELEPHONE ENCOUNTER
From: Gris Goncalves  To: Peggy Castillo APRN - CNP  Sent: 3/23/2020 2:27 PM EDT  Subject: Visit Follow-Up Question    I tried to do a follow up e-visit but it told me to contact the office. There wasn't a selection for dizziness or vertigo. I am still having bouts of dizziness and my headaches have increased in frequency and also intensity since cutting back on the topamax but the memory issues have lessened also since decreasing the topamax. I was unable to take a call from the physical therapists and they said my order for the sessions . However, I don't think I should try and do the sessions now with everything with COVID-19. Also my facial flushing is continuing and the cream I was using for that is out of refills. Could I get that refilled please?

## 2020-03-23 NOTE — TELEPHONE ENCOUNTER
Spoke with pt via phone and she has a video visit 3/24/2020 at 10am.      Future Appointments   Date Time Provider Ermelinda Brown   3/24/2020 10:00 AM REECE John

## 2020-03-24 ENCOUNTER — TELEMEDICINE (OUTPATIENT)
Dept: FAMILY MEDICINE CLINIC | Age: 40
End: 2020-03-24
Payer: COMMERCIAL

## 2020-03-24 PROCEDURE — 99214 OFFICE O/P EST MOD 30 MIN: CPT | Performed by: NURSE PRACTITIONER

## 2020-03-24 RX ORDER — AZELAIC ACID 0.15 G/G
1 GEL TOPICAL SEE ADMIN INSTRUCTIONS
Qty: 1 TUBE | Refills: 5 | Status: SHIPPED | OUTPATIENT
Start: 2020-03-24

## 2020-03-24 RX ORDER — AMITRIPTYLINE HYDROCHLORIDE 10 MG/1
10 TABLET, FILM COATED ORAL NIGHTLY
Qty: 90 TABLET | Refills: 1 | Status: SHIPPED | OUTPATIENT
Start: 2020-03-24 | End: 2021-04-11 | Stop reason: SDUPTHER

## 2020-03-24 NOTE — PROGRESS NOTES
OBJECTIVE:  Appearance: alert, well appearing, and in no distress and well hydrated. Neurological Exam: alert, oriented, normal speech, no focal findings or movement disorder noted. ASSESSMENT:  migraine - classic. Encounter Diagnoses   Name Primary?  Dizziness Yes    Facial flushing     Other migraine without status migrainosus, not intractable      PLAN:  Recommendations: lie in darkened room and apply cold packs prn for pain, side effect profile discussed in detail, asked to keep headache diary and patient reassured that neurodiagnostic workup not indicated from benign H & P. See orders for this visit as documented in the electronic medical record. Time spent in video visit over 15 minutes. Pt in agreement with plan.    Will stop topamax, will try elavil  continue with facial cream.

## 2020-05-08 ENCOUNTER — TELEPHONE (OUTPATIENT)
Dept: FAMILY MEDICINE CLINIC | Age: 40
End: 2020-05-08

## 2020-05-08 RX ORDER — SUMATRIPTAN 25 MG/1
TABLET, FILM COATED ORAL
Qty: 9 TABLET | Refills: 3 | Status: SHIPPED | OUTPATIENT
Start: 2020-05-08 | End: 2021-02-08 | Stop reason: SDUPTHER

## 2020-05-08 RX ORDER — ONDANSETRON 4 MG/1
4 TABLET, FILM COATED ORAL DAILY PRN
Qty: 30 TABLET | Refills: 0 | Status: SHIPPED | OUTPATIENT
Start: 2020-05-08 | End: 2022-06-29

## 2020-05-11 ENCOUNTER — TELEPHONE (OUTPATIENT)
Dept: FAMILY MEDICINE CLINIC | Age: 40
End: 2020-05-11

## 2020-05-11 RX ORDER — LEVOCETIRIZINE DIHYDROCHLORIDE 5 MG/1
5 TABLET, FILM COATED ORAL NIGHTLY
Qty: 90 TABLET | Refills: 3 | Status: SHIPPED | OUTPATIENT
Start: 2020-05-11 | End: 2020-11-13

## 2020-06-11 ENCOUNTER — E-VISIT (OUTPATIENT)
Dept: FAMILY MEDICINE CLINIC | Age: 40
End: 2020-06-11
Payer: COMMERCIAL

## 2020-06-11 PROCEDURE — 98971 NQHP OL DIG ASSMT&MGMT 11-20: CPT | Performed by: NURSE PRACTITIONER

## 2020-06-11 RX ORDER — AMOXICILLIN 500 MG/1
500 TABLET, FILM COATED ORAL 3 TIMES DAILY
Qty: 30 TABLET | Refills: 0 | Status: SHIPPED | OUTPATIENT
Start: 2020-06-11 | End: 2020-11-13

## 2020-06-11 RX ORDER — LORATADINE 10 MG/1
10 TABLET ORAL DAILY
Qty: 30 TABLET | Refills: 3 | Status: SHIPPED | OUTPATIENT
Start: 2020-06-11

## 2020-06-11 ASSESSMENT — LIFESTYLE VARIABLES: SMOKING_STATUS: NO, I'VE NEVER SMOKED

## 2020-06-15 ENCOUNTER — TELEPHONE (OUTPATIENT)
Dept: FAMILY MEDICINE CLINIC | Age: 40
End: 2020-06-15

## 2020-06-15 NOTE — TELEPHONE ENCOUNTER
AMB REFERRAL TO PHYSICAL THERAPY-    Referral placed on 3/24/2020. On 3/24/2020 - L/m to schedule  On 3/26/2020 - L/M on V/M to schedule therapy    On 3/31/2020 - L/M on V/M to schedule therapy    No response from patient and no more scheduling attempts will be made.     Ok to cancel referral?

## 2020-07-15 ENCOUNTER — PATIENT MESSAGE (OUTPATIENT)
Dept: FAMILY MEDICINE CLINIC | Age: 40
End: 2020-07-15

## 2020-07-16 NOTE — TELEPHONE ENCOUNTER
From: Alex Carey  To: REECE Meraz - CNP  Sent: 7/15/2020 7:05 PM EDT  Subject: Non-Urgent Medical Question    Is it possible for me to get a doctor referral for the test to see if I have the COVID antibodies? I know I was sick in November before any of this started circulating but I've never had anything like that before and I still have to use an inhaler to breathe sometimes. I've never had to use an inhaler before November and I still get winded often and I'd like to see if I could know if I've had it or not.

## 2020-07-22 ENCOUNTER — NURSE ONLY (OUTPATIENT)
Dept: LAB | Age: 40
End: 2020-07-22

## 2020-07-24 LAB — SARS-COV-2 ANTIBODY, TOTAL: NEGATIVE

## 2020-07-28 ENCOUNTER — TELEPHONE (OUTPATIENT)
Dept: FAMILY MEDICINE CLINIC | Age: 40
End: 2020-07-28

## 2020-07-28 NOTE — TELEPHONE ENCOUNTER
----- Message from REECE Lombardi CNP sent at 7/27/2020  5:43 PM EDT -----  Let pt know her covid test was negative

## 2020-08-04 ENCOUNTER — TELEPHONE (OUTPATIENT)
Dept: FAMILY MEDICINE CLINIC | Age: 40
End: 2020-08-04

## 2020-08-04 ENCOUNTER — TELEMEDICINE (OUTPATIENT)
Dept: FAMILY MEDICINE CLINIC | Age: 40
End: 2020-08-04
Payer: COMMERCIAL

## 2020-08-04 PROCEDURE — 99213 OFFICE O/P EST LOW 20 MIN: CPT | Performed by: NURSE PRACTITIONER

## 2020-08-04 RX ORDER — NAPROXEN 500 MG/1
500 TABLET ORAL 2 TIMES DAILY WITH MEALS
Qty: 60 TABLET | Refills: 3 | Status: SHIPPED | OUTPATIENT
Start: 2020-08-04 | End: 2020-11-13

## 2020-08-04 ASSESSMENT — ENCOUNTER SYMPTOMS: RESPIRATORY NEGATIVE: 1

## 2020-08-04 NOTE — PROGRESS NOTES
2020    TELEHEALTH EVALUATION -- Audio/Visual (During IJIIO-85 public health emergency)    HPI:  Visit conducted with pt at home and provider Michael Leary CNP in office. Kari Toledo (:  1980) has requested an audio/video evaluation for the following concern(s):    Pt states her left ankle started hurting yesterday and became swollen. No know injury she was just walking on it. Has never had this happen before. The ankle joint is swollen feels tight but not red or warm. It hurts when she walks and puts pressure  On it. Review of Systems   Constitutional: Negative for chills, fatigue and fever. Respiratory: Negative. Cardiovascular: Negative. Musculoskeletal: Positive for arthralgias, gait problem, joint swelling and myalgias. Skin: Negative. Neurological: Negative for weakness. Prior to Visit Medications    Medication Sig Taking? Authorizing Provider   naproxen (NAPROSYN) 500 MG tablet Take 1 tablet by mouth 2 times daily (with meals) Yes REECE Duncan CNP   Amoxicillin 500 MG TABS Take 500 m by mouth 3 times daily  REECE Galarza CNP   loratadine (CLARITIN) 10 MG tablet Take 1 tablet by mouth daily  REECE Duncan CNP   levocetirizine (XYZAL) 5 MG tablet Take 1 tablet by mouth nightly  REECE Duncan CNP   SUMAtriptan (IMITREX) 25 MG tablet Take 1 tablet by mouth at onset of headache, may repeat in 1 hour if no better. Not to exceed two doses in 24 hours.   REECE Duncan CNP   ondansetron (ZOFRAN) 4 MG tablet Take 1 tablet by mouth daily as needed for Nausea or Vomiting  REECE Duncan CNP   amitriptyline (ELAVIL) 10 MG tablet Take 1 tablet by mouth nightly  REECE Duncan CNP   Azelaic Acid 15 % GEL Apply 1 applicator topically See Admin Instructions  REECE Duncan CNP   fluticasone (FLONASE) 50 MCG/ACT nasal spray 1 spray by Each Nostril route daily  REECE Duncan CNP   B Complex Vitamins (VITAMIN-B COMPLEX PO) Take by mouth daily  Historical Provider, MD   MAGNESIUM-POTASSIUM PO Take by mouth daily  Historical Provider, MD   Cranberry-Milk Thistle (LIVER & KIDNEY CLEANSER PO) Take by mouth 2 times daily  Historical Provider, MD   albuterol sulfate  (90 Base) MCG/ACT inhaler Inhale 2 puffs into the lungs 4 times daily as needed for Wheezing  REECE Lowery CNP   lisinopril (PRINIVIL;ZESTRIL) 5 MG tablet Take 1 tablet by mouth daily  REECE Dennis CNP   omeprazole (PRILOSEC) 20 MG delayed release capsule Take 1 capsule by mouth every morning (before breakfast)  REECE Dennis CNP   acetaminophen (TYLENOL) 325 MG tablet Take 650 mg by mouth every 6 hours as needed for Pain.   Historical Provider, MD       Social History     Tobacco Use    Smoking status: Never Smoker    Smokeless tobacco: Never Used   Substance Use Topics    Alcohol use: Yes     Comment: occasional    Drug use: No        No Known Allergies,   Past Medical History:   Diagnosis Date    Blood clot in vein     Blood in urine     Change in bowel habits     Chills     DVT (deep vein thrombosis) in pregnancy     Endometriosis     Fever     Hoarseness     Irregular periods     Liver disease     Migraines     Pulmonary emboli (HCC)     SOB (shortness of breath)    ,   Past Surgical History:   Procedure Laterality Date    ANKLE SURGERY Right     achillies     SECTION      DILATION AND CURETTAGE OF UTERUS N/A 2019    DILATATION AND CURETTAGE HYSTEROSCOPY performed by General Artist, MD at [address] [place] [address] IVC [address]      removed 2019    OTHER SURGICAL HISTORY      ivc filter    UPPER GASTROINTESTINAL ENDOSCOPY  2019       ,   Social History     Tobacco Use    Smoking status: Never Smoker    Smokeless tobacco: Never Used   Substance Use Topics    Alcohol use: Yes     Comment: occasional    Drug use: No   ,   Family History   Problem Relation Age of Onset    Early Death Neg Hx     Colon Cancer Neg Hx     Colon Polyps Neg Hx     Esophageal Cancer Neg Hx    ,   There is no immunization history on file for this patient.,   Health Maintenance   Topic Date Due    HIV screen  09/21/1997    DTaP/Tdap/Td vaccine (1 - Tdap) 09/21/2001    Cervical cancer screen  06/20/2019    Flu vaccine (1) 09/01/2020    Potassium monitoring  02/20/2021    Creatinine monitoring  02/20/2021    Hepatitis A vaccine  Aged Out    Hepatitis B vaccine  Aged Out    Hib vaccine  Aged Out    Meningococcal (ACWY) vaccine  Aged Out    Pneumococcal 0-64 years Vaccine  Aged Out    Varicella vaccine  Discontinued       PHYSICAL EXAMINATION:  Vital Signs: (As obtained by patient/caregiver or practitioner observation)    Blood pressure-  Heart rate-    Respiratory rate-    Temperature-  Pulse oximetry-     Constitutional: [x] Appears well-developed and well-nourished [x] No apparent distress      [] Mental status  [x] Alert and awake  [x] Oriented to person/place/time []    Pulmonary/Chest: [x] Respiratory effort normal.  [x] No visualized signs of difficulty breathing or respiratory distress        []   Musculoskeletal:   [] Normal gait with no signs of ataxia         [] Normal range of motion of neck        [x] Abnormal- left ankle joint swollen, midfoot s swollen, pt unable to put weight on left ankle, no erythema and she states no warmth. Toes are pink    Skin:        [x] No significant exanthematous lesions or discoloration noted on facial skin         [] Abnormal-            Other pertinent observable physical exam findings-     ASSESSMENT/PLAN:  1. Edema of left ankle  Naprosyn  Ice to ankle  Pt has ankle boot will wear this and monitor  Question sprain vs pathology    2. Acute left ankle pain  As above  Pt declines crutches    Return in about 1 week (around 8/11/2020).     Ruth Ann Uriostegui is a 42 y.o. female being evaluated by a Virtual Visit (video visit) encounter to address concerns as mentioned above. A caregiver was present when appropriate. Due to this being a TeleHealth encounter (During OSHQB-88 public health emergency), evaluation of the following organ systems was limited: Vitals/Constitutional/EENT/Resp/CV/GI//MS/Neuro/Skin/Heme-Lymph-Imm. Pursuant to the emergency declaration under the [address] [place] [address], [address] [place] [address] and the [employer] Act, this Virtual Visit was conducted with patient's (and/or legal guardian's) consent, to reduce the patient's risk of exposure to COVID-19 and provide necessary medical care. The patient (and/or legal guardian) has also been advised to contact this office for worsening conditions or problems, and seek emergency medical treatment and/or call 911 if deemed necessary. Patient identification was verified at the start of the visit: Yes    Total time spent on this encounter: Not billed by time    Services were provided through a video synchronous discussion virtually to substitute for in-person clinic visit. Patient and provider were located at their individual homes. --REECE Bourne CNP on 8/4/2020 at 3:39 PM    An electronic signature was used to authenticate this note.

## 2020-08-04 NOTE — TELEPHONE ENCOUNTER
Message   Received:  Today   Message Contents   REECE Olguin - CNP  P [facility] Clinical Support               Call pt to set up a 1 week f/u late next week on ankle pain

## 2020-08-05 NOTE — TELEPHONE ENCOUNTER
Pt already scheduled.      Future Appointments   Date Time Provider Ermelinda Danielsi   8/13/2020  2:40 PM REECE Bettencourt

## 2020-08-10 ENCOUNTER — TELEPHONE (OUTPATIENT)
Dept: FAMILY MEDICINE CLINIC | Age: 40
End: 2020-08-10

## 2020-08-19 ENCOUNTER — TELEPHONE (OUTPATIENT)
Dept: FAMILY MEDICINE CLINIC | Age: 40
End: 2020-08-19

## 2020-08-19 ENCOUNTER — OFFICE VISIT (OUTPATIENT)
Dept: FAMILY MEDICINE CLINIC | Age: 40
End: 2020-08-19
Payer: COMMERCIAL

## 2020-08-19 ENCOUNTER — HOSPITAL ENCOUNTER (OUTPATIENT)
Age: 40
Discharge: HOME | End: 2020-08-19
Payer: COMMERCIAL

## 2020-08-19 ENCOUNTER — HOSPITAL ENCOUNTER (OUTPATIENT)
Dept: GENERAL RADIOLOGY | Age: 40
Discharge: HOME | End: 2020-08-19
Payer: COMMERCIAL

## 2020-08-19 VITALS
WEIGHT: 245 LBS | DIASTOLIC BLOOD PRESSURE: 80 MMHG | TEMPERATURE: 98.3 F | RESPIRATION RATE: 14 BRPM | SYSTOLIC BLOOD PRESSURE: 126 MMHG | OXYGEN SATURATION: 98 % | BODY MASS INDEX: 43.41 KG/M2 | HEIGHT: 63 IN | HEART RATE: 92 BPM

## 2020-08-19 PROCEDURE — 99213 OFFICE O/P EST LOW 20 MIN: CPT | Performed by: NURSE PRACTITIONER

## 2020-08-19 PROCEDURE — 73630 X-RAY EXAM OF FOOT: CPT

## 2020-08-19 RX ORDER — LANOLIN ALCOHOL/MO/W.PET/CERES
250 CREAM (GRAM) TOPICAL NIGHTLY
COMMUNITY
End: 2020-11-13

## 2020-08-19 RX ORDER — PREDNISONE 20 MG/1
TABLET ORAL
Qty: 18 TABLET | Refills: 0 | Status: SHIPPED | OUTPATIENT
Start: 2020-08-19 | End: 2020-11-13

## 2020-08-19 ASSESSMENT — ENCOUNTER SYMPTOMS: RESPIRATORY NEGATIVE: 1

## 2020-08-19 NOTE — PROGRESS NOTES
[address] [address] [address] PREMA [place] [address]  Dept: [phone]  Dept Fax: [phone]  Loc: [phone]    Alex Carey is a 42 y.o. femalewho presents today for her medical conditions/complaints as noted below. Ruben Angeles c/o of Foot Pain (left foot pain on top of foot for past 3 weeks, no mech of injury ) and Health Maintenance (had pap with [facility] in November 2020)      HPI:      Pt here to follow up on left foot and ankle pain and swelling. Pt developed spontaneous pain and swelling of her left foot and ankle without any known injury. This happened 2-3 weeks ago she has bene using ice, naprosyn and wearing a walking boot. States the swelling has improved some, but the foot and ankle joint continue to swell, it hurts up her whole leg when she puts pressure on her left foot. It does not hurt when she wears her walking boot. Thinks the foot is weak and she is having rom issues.           Current Outpatient Medications   Medication Sig Dispense Refill    niacin 250 MG extended release capsule Take 250 mg by mouth nightly      naproxen (NAPROSYN) 500 MG tablet Take 1 tablet by mouth 2 times daily (with meals) 60 tablet 3    loratadine (CLARITIN) 10 MG tablet Take 1 tablet by mouth daily 30 tablet 3    levocetirizine (XYZAL) 5 MG tablet Take 1 tablet by mouth nightly 90 tablet 3    ondansetron (ZOFRAN) 4 MG tablet Take 1 tablet by mouth daily as needed for Nausea or Vomiting 30 tablet 0    amitriptyline (ELAVIL) 10 MG tablet Take 1 tablet by mouth nightly 90 tablet 1    Azelaic Acid 15 % GEL Apply 1 applicator topically See Admin Instructions 1 Tube 5    fluticasone (FLONASE) 50 MCG/ACT nasal spray 1 spray by Each Nostril route daily 2 Bottle 1    B Complex Vitamins (VITAMIN-B COMPLEX PO) Take by mouth daily      MAGNESIUM-POTASSIUM PO Take by mouth daily      Cranberry-Milk Thistle (LIVER & KIDNEY CLEANSER PO) Take by mouth 2 times daily      albuterol sulfate  (90 Base) MCG/ACT inhaler Inhale 2 puffs into the lungs 4 times daily as needed for Wheezing 3 Inhaler 1    lisinopril (PRINIVIL;ZESTRIL) 5 MG tablet Take 1 tablet by mouth daily 90 tablet 1    omeprazole (PRILOSEC) 20 MG delayed release capsule Take 1 capsule by mouth every morning (before breakfast) 30 capsule 5    Amoxicillin 500 MG TABS Take 500 m by mouth 3 times daily 30 tablet 0    SUMAtriptan (IMITREX) 25 MG tablet Take 1 tablet by mouth at onset of headache, may repeat in 1 hour if no better. Not to exceed two doses in 24 hours. 9 tablet 3    acetaminophen (TYLENOL) 325 MG tablet Take 650 mg by mouth every 6 hours as needed for Pain. No current facility-administered medications for this visit.            Past Medical History:   Diagnosis Date    Blood clot in vein     Blood in urine     Change in bowel habits     Chills     DVT (deep vein thrombosis) in pregnancy     Endometriosis     Fever     Hoarseness     Irregular periods     Liver disease     Migraines     Pulmonary emboli (HCC)     SOB (shortness of breath)       Past Surgical History:   Procedure Laterality Date    ANKLE SURGERY Right     achillies     SECTION      DILATION AND CURETTAGE OF UTERUS N/A 2019    DILATATION AND CURETTAGE HYSTEROSCOPY performed by Teto Gudino MD at [facility] IVC FILTER INSERTION      removed 2019    OTHER SURGICAL HISTORY      ivc filter    UPPER GASTROINTESTINAL ENDOSCOPY  2019    [facility] Merrill     Family History   Problem Relation Age of Onset    Early Death Neg Hx     Colon Cancer Neg Hx     Colon Polyps Neg Hx     Esophageal Cancer Neg Hx      Social History     Tobacco Use    Smoking status: Never Smoker    Smokeless tobacco: Never Used   Substance Use Topics    Alcohol use: Yes     Comment: occasional        No Known Allergies    Health Maintenance   Topic Date Due    HIV screen 09/21/1997    DTaP/Tdap/Td vaccine (1 - Tdap) 09/21/2001    Cervical cancer screen  06/20/2019    Flu vaccine (1) 09/01/2020    Potassium monitoring  02/20/2021    Creatinine monitoring  02/20/2021    Hepatitis A vaccine  Aged Out    Hepatitis B vaccine  Aged Out    Hib vaccine  Aged Out    Meningococcal (ACWY) vaccine  Aged Out    Pneumococcal 0-64 years Vaccine  Aged Out    Varicella vaccine  Discontinued       Subjective:      Review of Systems   Respiratory: Negative. Cardiovascular: Negative. Musculoskeletal: Positive for arthralgias, gait problem, joint swelling and myalgias. Skin: Negative. Neurological: Positive for weakness. Negative for numbness. Objective:      /80   Pulse 92   Temp 98.3 °F (36.8 °C) (Oral)   Resp 14   Ht 5' 3\" (1.6 m)   Wt 245 lb (111.1 kg)   LMP 07/28/2020   SpO2 98%   BMI 43.40 kg/m²      Physical Exam  Vitals signs and nursing note reviewed. Constitutional:       Appearance: She is not ill-appearing. Cardiovascular:      Rate and Rhythm: Normal rate and regular rhythm. Pulses: Normal pulses. Heart sounds: Normal heart sounds. No murmur. Musculoskeletal:      Right shoulder: She exhibits decreased range of motion, tenderness, swelling and decreased strength. She exhibits no deformity and normal pulse. Comments: Left ankle joint with edema and tenderness with pressure, no pain with palpation of achilles tendon, pt had tenderness with palpation of left midfoot, toes edematous but no erythema. Left pedal pulses equal and strong, ROM decreased, weak left inversion and eversion. Gait alteration noted without walking boot. Left foot strength 3/5. Skin:     General: Skin is warm and dry. Capillary Refill: Capillary refill takes less than 2 seconds. Neurological:      Mental Status: She is alert. Assessment/Plan:           1. Acute left ankle pain  Await foot x-ray    - XR FOOT LEFT (MIN 3 VIEWS);  [facility]  - [facility]    2. Edema of left ankle  Start therapy  Continue naprosyn and walking boot  - XR FOOT LEFT (MIN 3 VIEWS); Future    3. Left foot pain  Consider strain vs stress fracture  - XR FOOT LEFT (MIN 3 VIEWS); Future  - [facility]  If no better in 2 weeks of therapy will consider ct of foot or MRI to rule out stress fracture. Pt in agreement with plan. Return in 2 weeks (on 9/2/2020), or if symptoms worsen or fail to improve, for f/u ankle. Reccommended tobaccocessation options including pharmacologic methods, counseled great than 3 minutesduring this visit:  Yes[]  No  []       Patient given educational materials -see patient instructions. Discussed use, benefit, and side effects of prescribedmedications. All patient questions answered. Pt voiced understanding. Reviewedhealth maintenance. Instructed to continue current medications, diet and exercise. Patient agreed with treatment plan. Follow up as directed.        Electronicallysigned by REECE Atwood CNP on 8/19/2020 at 11:20 AM

## 2020-08-20 ENCOUNTER — TELEPHONE (OUTPATIENT)
Dept: FAMILY MEDICINE CLINIC | Age: 40
End: 2020-08-20

## 2020-08-20 NOTE — TELEPHONE ENCOUNTER
----- Message from REECE Meraz CNP sent at 8/19/2020  5:32 PM EDT -----  Let pt know her x-ray identified achilles tendon and heel spurs, it is also noted there are some bone spurs in the joint in the area of pain. We may need to consider referral to a podiatrist if symptoms continue as she may benefit from a joint injection for swelling and pain control. We can discuss thi more at her f/u visit. I will send in a script for steroid pills for her to take in the meantime, she can stop the naprosyn while taking the prednisone. But restart naprosyn when prednisone finished.

## 2020-09-02 ENCOUNTER — OFFICE VISIT (OUTPATIENT)
Dept: FAMILY MEDICINE CLINIC | Age: 40
End: 2020-09-02
Payer: COMMERCIAL

## 2020-09-02 VITALS
HEART RATE: 86 BPM | DIASTOLIC BLOOD PRESSURE: 84 MMHG | BODY MASS INDEX: 43.09 KG/M2 | WEIGHT: 243.2 LBS | RESPIRATION RATE: 14 BRPM | TEMPERATURE: 98 F | OXYGEN SATURATION: 98 % | HEIGHT: 63 IN | SYSTOLIC BLOOD PRESSURE: 122 MMHG

## 2020-09-02 PROCEDURE — 99213 OFFICE O/P EST LOW 20 MIN: CPT | Performed by: NURSE PRACTITIONER

## 2020-09-02 ASSESSMENT — ENCOUNTER SYMPTOMS
BACK PAIN: 0
RESPIRATORY NEGATIVE: 1

## 2020-09-02 NOTE — PROGRESS NOTES
[address] [place] [address]  [address] PREMA Gomez [address]  Dept: [phone]  Dept Fax: [phone]  Loc: [phone]    Osei Deng is a 42 y.o. femalewho presents today for her medical conditions/complaints as noted below. Nahum Angeles c/o of Foot Pain (Pt presents for a 2 week f/u for foot and ankle problems. Pt states she has been doing better. She has an elyse with PT on 9/11. She has been doing some excercises at home.)      HPI:      Pt here to follow up on her left ankle and foot pain. States it started suddenly about 2 months ago. Has brodie wearing a boot at work, doing home exercises, going to start formal PT, taking prednisone and muscle relaxers and NSAIDS, using ice several times a day. Still hard to walk on it,  States there has been mild improvement. We did  Review her foot and ankle x-ray. It did identify some calcaneal and achilles tendon spurs, states she has always had these. Has had them injected in past. Also identified talonavicular degenerative changes, she states it does hurt in that area. Hurts to put weight on it. She has had several clots, we did do clotting labs, was referred to genetics but never went. Her daughter was just diagnosed with mthfr, she will get copies of daughters labs and we will order any additional labs she has not had. Current Outpatient Medications   Medication Sig Dispense Refill    niacin 250 MG extended release capsule Take 250 mg by mouth nightly      naproxen (NAPROSYN) 500 MG tablet Take 1 tablet by mouth 2 times daily (with meals) 60 tablet 3    loratadine (CLARITIN) 10 MG tablet Take 1 tablet by mouth daily 30 tablet 3    levocetirizine (XYZAL) 5 MG tablet Take 1 tablet by mouth nightly 90 tablet 3    SUMAtriptan (IMITREX) 25 MG tablet Take 1 tablet by mouth at onset of headache, may repeat in 1 hour if no better. Not to exceed two doses in 24 hours.  9 tablet 3    ondansetron (ZOFRAN) 4 MG tablet Take 1 tablet by mouth daily as needed for Nausea or Vomiting 30 tablet 0    amitriptyline (ELAVIL) 10 MG tablet Take 1 tablet by mouth nightly 90 tablet 1    Azelaic Acid 15 % GEL Apply 1 applicator topically See Admin Instructions 1 Tube 5    fluticasone (FLONASE) 50 MCG/ACT nasal spray 1 spray by Each Nostril route daily 2 Bottle 1    B Complex Vitamins (VITAMIN-B COMPLEX PO) Take by mouth daily      MAGNESIUM-POTASSIUM PO Take by mouth daily      Cranberry-Milk Thistle (LIVER & KIDNEY CLEANSER PO) Take by mouth 2 times daily      albuterol sulfate  (90 Base) MCG/ACT inhaler Inhale 2 puffs into the lungs 4 times daily as needed for Wheezing 3 Inhaler 1    lisinopril (PRINIVIL;ZESTRIL) 5 MG tablet Take 1 tablet by mouth daily 90 tablet 1    omeprazole (PRILOSEC) 20 MG delayed release capsule Take 1 capsule by mouth every morning (before breakfast) 30 capsule 5    predniSONE (DELTASONE) 20 MG tablet 1 tab po tid for 3 days 1 tab po bid for 3 days 1 tab po once day for 3 days (Patient not taking: Reported on 2020) 18 tablet 0    Amoxicillin 500 MG TABS Take 500 m by mouth 3 times daily 30 tablet 0    acetaminophen (TYLENOL) 325 MG tablet Take 650 mg by mouth every 6 hours as needed for Pain. No current facility-administered medications for this visit.            Past Medical History:   Diagnosis Date    Blood clot in vein     Blood in urine     Change in bowel habits     Chills     DVT (deep vein thrombosis) in pregnancy     Endometriosis     Fever     Hoarseness     Irregular periods     Liver disease     Migraines     Pulmonary emboli (HCC)     SOB (shortness of breath)       Past Surgical History:   Procedure Laterality Date    ANKLE SURGERY Right     achillies     SECTION      DILATION AND CURETTAGE OF UTERUS N/A 2019    DILATATION AND CURETTAGE HYSTEROSCOPY performed by Elidia Shelton MD at [address] [place] [address] IVC FILTER INSERTION      removed march 2019    OTHER SURGICAL HISTORY      ivc filter    UPPER GASTROINTESTINAL ENDOSCOPY  12/28/2019         Family History   Problem Relation Age of Onset    Early Death Neg Hx     Colon Cancer Neg Hx     Colon Polyps Neg Hx     Esophageal Cancer Neg Hx      Social History     Tobacco Use    Smoking status: Never Smoker    Smokeless tobacco: Never Used   Substance Use Topics    Alcohol use: Yes     Comment: occasional        No Known Allergies    Health Maintenance   Topic Date Due    HIV screen  09/21/1997    DTaP/Tdap/Td vaccine (1 - Tdap) 09/21/2001    Cervical cancer screen  06/20/2019    Flu vaccine (1) 09/01/2020    Potassium monitoring  02/20/2021    Creatinine monitoring  02/20/2021    Hepatitis A vaccine  Aged Out    Hepatitis B vaccine  Aged Out    Hib vaccine  Aged Out    Meningococcal (ACWY) vaccine  Aged Out    Pneumococcal 0-64 years Vaccine  Aged Out    Varicella vaccine  Discontinued       Subjective:      Review of Systems   Respiratory: Negative. Cardiovascular: Negative. Musculoskeletal: Positive for arthralgias, gait problem and myalgias. Negative for back pain, neck pain and neck stiffness. Skin: Negative. Objective:      /84   Pulse 86   Temp 98 °F (36.7 °C)   Resp 14   Ht 5' 3\" (1.6 m)   Wt 243 lb 3.2 oz (110.3 kg)   LMP 08/03/2020   SpO2 98%   BMI 43.08 kg/m²      Physical Exam  Vitals signs and nursing note reviewed. Constitutional:       Appearance: She is obese. She is not ill-appearing. Cardiovascular:      Rate and Rhythm: Normal rate and regular rhythm. Pulses: Normal pulses. Dorsalis pedis pulses are 2+ on the left side. Posterior tibial pulses are 2+ on the left side. Heart sounds: Normal heart sounds. No murmur. Musculoskeletal:      Left foot: Normal range of motion. No deformity, foot drop or prominent metatarsal heads.    Feet:      Left foot:      Skin

## 2020-09-11 ENCOUNTER — HOSPITAL ENCOUNTER (OUTPATIENT)
Dept: PHYSICAL THERAPY | Age: 40
Setting detail: THERAPIES SERIES
Discharge: HOME | End: 2020-09-11
Payer: COMMERCIAL

## 2020-11-13 ENCOUNTER — HOSPITAL ENCOUNTER (EMERGENCY)
Age: 40
Discharge: HOME | End: 2020-11-13
Attending: EMERGENCY MEDICINE
Payer: COMMERCIAL

## 2020-11-13 VITALS
HEIGHT: 63 IN | OXYGEN SATURATION: 97 % | SYSTOLIC BLOOD PRESSURE: 183 MMHG | DIASTOLIC BLOOD PRESSURE: 87 MMHG | HEART RATE: 88 BPM | RESPIRATION RATE: 18 BRPM | BODY MASS INDEX: 38.98 KG/M2 | WEIGHT: 220 LBS | TEMPERATURE: 97.8 F

## 2020-11-13 PROCEDURE — 99213 OFFICE O/P EST LOW 20 MIN: CPT | Performed by: EMERGENCY MEDICINE

## 2020-11-13 PROCEDURE — U0003 INFECTIOUS AGENT DETECTION BY NUCLEIC ACID (DNA OR RNA); SEVERE ACUTE RESPIRATORY SYNDROME CORONAVIRUS 2 (SARS-COV-2) (CORONAVIRUS DISEASE [COVID-19]), AMPLIFIED PROBE TECHNIQUE, MAKING USE OF HIGH THROUGHPUT TECHNOLOGIES AS DESCRIBED BY CMS-2020-01-R: HCPCS

## 2020-11-13 PROCEDURE — 99215 OFFICE O/P EST HI 40 MIN: CPT

## 2020-11-13 ASSESSMENT — ENCOUNTER SYMPTOMS
FACIAL SWELLING: 0
NAUSEA: 0
TROUBLE SWALLOWING: 0
CONSTIPATION: 0
CHOKING: 0
SHORTNESS OF BREATH: 0
RHINORRHEA: 1
SORE THROAT: 1
EYE PAIN: 0
DIARRHEA: 0
VOMITING: 0
ABDOMINAL PAIN: 0
SINUS PRESSURE: 0
BLOOD IN STOOL: 0
COUGH: 0
EYE DISCHARGE: 0
STRIDOR: 0
WHEEZING: 0
VOICE CHANGE: 0
EYE REDNESS: 0
BACK PAIN: 0

## 2020-11-13 NOTE — LETTER
[facility] [facility]  [address] [place] [id number]  Phone: [phone]               November 13, 2020    Patient: Shannan Mays   YOB: 1980   Date of Visit: 11/13/2020       To Whom It May Concern:    Matt Elizabeth was seen and treated in our emergency department on 11/13/2020. She may return to work on With negative COVID-19 test result.   No work starting November 13, 2020 until negative COVID-19 test result obtained      Sincerely,       Lele Ratliff MD         Signature:__________________________________

## 2020-11-13 NOTE — ED PROVIDER NOTES
[address] [facility] Encounter      [facility]       Chief Complaint   Patient presents with    Pharyngitis    Headache    Covid Testing       Nurses Notes reviewed and I agree except as noted in the HPI. HISTORY OF PRESENT ILLNESS   Therese Oh is a 47 y.o. female who presents with 3-day history of sore throat, headache, fatigue, congestion. Patient has close COVID-19 exposure. No chest pain, shortness of breath, abdominal pain, vomiting, dizziness, syncope, rash,  symptoms. No history of asthma or diabetes. Non-smoker. REVIEW OF SYSTEMS     Review of Systems   Constitutional: Positive for appetite change and chills. Negative for fatigue, fever and unexpected weight change. HENT: Positive for congestion, postnasal drip, rhinorrhea and sore throat. Negative for ear discharge, ear pain, facial swelling, hearing loss, nosebleeds, sinus pressure, trouble swallowing and voice change. Eyes: Negative for pain, discharge, redness and visual disturbance. Respiratory: Negative for cough, choking, shortness of breath, wheezing and stridor. Cardiovascular: Negative for chest pain and leg swelling. Gastrointestinal: Negative for abdominal pain, blood in stool, constipation, diarrhea, nausea and vomiting. Genitourinary: Negative for dysuria, flank pain, frequency, hematuria, urgency, vaginal bleeding and vaginal discharge. Musculoskeletal: Negative for arthralgias, back pain, neck pain and neck stiffness. Skin: Negative for rash. Neurological: Positive for headaches. Negative for dizziness, seizures, syncope, weakness and light-headedness. Hematological: Negative for adenopathy. Does not bruise/bleed easily. Psychiatric/Behavioral: Negative for confusion, sleep disturbance and suicidal ideas. The patient is not nervous/anxious. All other systems reviewed and are negative.       PAST MEDICAL HISTORY         Diagnosis Date    Blood clot in vein     Blood in urine     Change in bowel habits     Chills     DVT (deep vein thrombosis) in pregnancy     Endometriosis     Fever     Hoarseness     Irregular periods     Liver disease     Migraines     Pulmonary emboli (HCC)     SOB (shortness of breath)        SURGICAL HISTORY     Patient  has a past surgical history that includes  section; other surgical history; Ankle surgery (Right); IVC filter insertion; Dilation and curettage of uterus (N/A, 2019); and Upper gastrointestinal endoscopy (2019). CURRENT MEDICATIONS       Discharge Medication List as of 2020  6:48 PM      CONTINUE these medications which have NOT CHANGED    Details   loratadine (CLARITIN) 10 MG tablet Take 1 tablet by mouth daily, Disp-30 tablet,R-3Normal      SUMAtriptan (IMITREX) 25 MG tablet Take 1 tablet by mouth at onset of headache, may repeat in 1 hour if no better. Not to exceed two doses in 24 hours. , Disp-9 tablet, R-3Normal      B Complex Vitamins (VITAMIN-B COMPLEX PO) Take by mouth dailyHistorical Med      albuterol sulfate  (90 Base) MCG/ACT inhaler Inhale 2 puffs into the lungs 4 times daily as needed for Wheezing, Disp-3 Inhaler, R-1Normal      lisinopril (PRINIVIL;ZESTRIL) 5 MG tablet Take 1 tablet by mouth daily, Disp-90 tablet, R-1Normal      ondansetron (ZOFRAN) 4 MG tablet Take 1 tablet by mouth daily as needed for Nausea or Vomiting, Disp-30 tablet, R-0Normal      amitriptyline (ELAVIL) 10 MG tablet Take 1 tablet by mouth nightly, Disp-90 tablet, R-1Normal      Azelaic Acid 15 % GEL Apply 1 applicator topically See Admin Instructions, Disp-1 Tube, R-5Normal      fluticasone (FLONASE) 50 MCG/ACT nasal spray 1 spray by Each Nostril route daily, Disp-2 Bottle, R-1Normal      acetaminophen (TYLENOL) 325 MG tablet Take 650 mg by mouth every 6 hours as needed for Pain. ALLERGIES     Patient is has No Known Allergies.     FAMILY HISTORY     Patient'sfamily history is not on file.    SOCIAL HISTORY     Patient  reports that she has never smoked. She has never used smokeless tobacco. She reports current alcohol use. She reports that she does not use drugs. PHYSICAL EXAM     ED TRIAGE VITALS  BP: (!) 183/87, Temp: 97.8 °F (36.6 °C), Pulse: 88, Resp: 18, SpO2: 97 %  Physical Exam  Vitals signs and nursing note reviewed. Constitutional:       General: She is not in acute distress. Appearance: She is well-developed. She is not ill-appearing. Comments: Moist membranes, normal airway   HENT:      Head: Normocephalic and atraumatic. Right Ear: Tympanic membrane and external ear normal.      Left Ear: Tympanic membrane and external ear normal.      Nose: Congestion and rhinorrhea present. Right Sinus: No maxillary sinus tenderness or frontal sinus tenderness. Left Sinus: No maxillary sinus tenderness or frontal sinus tenderness. Mouth/Throat:      Pharynx: No oropharyngeal exudate. Comments: Oropharynx normal  Eyes:      General: No scleral icterus. Right eye: No discharge. Left eye: No discharge. Extraocular Movements:      Right eye: Normal extraocular motion. Left eye: Normal extraocular motion. Conjunctiva/sclera: Conjunctivae normal.      Pupils: Pupils are equal, round, and reactive to light. Comments: Conjunctiva clear   Neck:      Musculoskeletal: Normal range of motion. Thyroid: No thyromegaly. Vascular: No JVD. Comments: No meningismus  Cardiovascular:      Rate and Rhythm: Normal rate and regular rhythm. Pulses: Normal pulses. Heart sounds: Normal heart sounds, S1 normal and S2 normal. No murmur. No friction rub. No gallop. Pulmonary:      Effort: Pulmonary effort is normal. No tachypnea or respiratory distress. Breath sounds: Normal breath sounds. No stridor. No decreased breath sounds, wheezing, rhonchi or rales.       Comments: No cough, lungs clear  Chest:      Chest wall: No tenderness. Abdominal:      General: Bowel sounds are normal. There is no distension. Palpations: Abdomen is soft. There is no mass. Tenderness: There is no abdominal tenderness. There is no right CVA tenderness, left CVA tenderness, guarding or rebound. Comments: Soft nontender   Musculoskeletal: Normal range of motion. General: No tenderness. Right lower leg: Normal.      Left lower leg: Normal.   Lymphadenopathy:      Cervical: Cervical adenopathy present. Right cervical: Superficial cervical adenopathy present. No deep or posterior cervical adenopathy. Left cervical: Superficial cervical adenopathy present. No deep or posterior cervical adenopathy. Skin:     General: Skin is warm and dry. Findings: No erythema or rash. Comments: No rash or bruising on examination   Neurological:      Mental Status: She is alert and oriented to person, place, and time. Cranial Nerves: No cranial nerve deficit. Motor: No abnormal muscle tone. Coordination: Coordination normal.      Deep Tendon Reflexes: Reflexes are normal and symmetric. Reflexes normal.      Comments: Appropriate, no focal findings   Psychiatric:         Behavior: Behavior normal.         Thought Content: Thought content normal.         Judgment: Judgment normal.         DIAGNOSTIC RESULTS   Labs: No results found for this visit on 11/13/20. IMAGING:  No orders to display     URGENT CARE COURSE:     Vitals:    11/13/20 1821   BP: (!) 183/87   Pulse: 88   Resp: 18   Temp: 97.8 °F (36.6 °C)   TempSrc: Tympanic   SpO2: 97%   Weight: 220 lb (99.8 kg)   Height: 5' 3\" (1.6 m)       Medications - No data to display  PROCEDURES:  None  FINALIMPRESSION      1. Systemic viral illness    2. Close exposure to COVID-19 virus    3. Person under investigation for COVID-19    4.  Elevated blood pressure reading        DISPOSITION/PLAN   DISPOSITION Decision To Discharge 11/13/2020 06:47:09 PM  Nontoxic, well-hydrated, normal airway. No airway abscess or epiglottitis, sepsis, CNS infection, pneumonia, hypoxia, bronchospasm. No bacterial infection. COVID-19 test performed. Patient instructed to maintain quarantine until negative COVID-19 test result. She is use Tylenol for fever and pain, increase oral liquids and rest.  Patient to recheck with PCP in 6 days if problems persist, and understands to go to ED if worse. Patient given verbal and written instructions regarding COVID-19 treatment.   PATIENT REFERRED TO:  REECE Marks CNP 68  [phone]    Schedule an appointment as soon as possible for a visit in 6 days  Recheck if problems persist, go to emergency if worse    DISCHARGE MEDICATIONS:  Discharge Medication List as of 11/13/2020  6:48 PM        Discharge Medication List as of 11/13/2020  6:48 PM          Leopoldo Avers, MD Leopoldo Avers, MD  11/13/20 0710

## 2020-11-14 ENCOUNTER — CARE COORDINATION (OUTPATIENT)
Dept: CARE COORDINATION | Age: 40
End: 2020-11-14

## 2020-11-15 LAB — SARS-COV-2: NOT DETECTED

## 2021-02-05 DIAGNOSIS — G43.809 OTHER MIGRAINE WITHOUT STATUS MIGRAINOSUS, NOT INTRACTABLE: ICD-10-CM

## 2021-02-08 DIAGNOSIS — G43.809 OTHER MIGRAINE WITHOUT STATUS MIGRAINOSUS, NOT INTRACTABLE: ICD-10-CM

## 2021-02-08 RX ORDER — SUMATRIPTAN 25 MG/1
TABLET, FILM COATED ORAL
Qty: 9 TABLET | Refills: 3 | Status: SHIPPED | OUTPATIENT
Start: 2021-02-08 | End: 2022-06-29

## 2021-02-08 RX ORDER — SUMATRIPTAN 25 MG/1
TABLET, FILM COATED ORAL
Qty: 9 TABLET | Refills: 3 | Status: SHIPPED | OUTPATIENT
Start: 2021-02-08

## 2021-02-18 ENCOUNTER — E-VISIT (OUTPATIENT)
Dept: FAMILY MEDICINE CLINIC | Age: 41
End: 2021-02-18
Payer: COMMERCIAL

## 2021-02-18 DIAGNOSIS — M54.50 LUMBAR PAIN: Primary | ICD-10-CM

## 2021-02-18 DIAGNOSIS — M54.30 SCIATICA, UNSPECIFIED LATERALITY: ICD-10-CM

## 2021-02-18 DIAGNOSIS — M62.838 MUSCLE SPASM: ICD-10-CM

## 2021-02-18 PROCEDURE — 98971 NQHP OL DIG ASSMT&MGMT 11-20: CPT | Performed by: NURSE PRACTITIONER

## 2021-02-19 RX ORDER — NAPROXEN 500 MG/1
500 TABLET ORAL 2 TIMES DAILY WITH MEALS
Qty: 60 TABLET | Refills: 0 | Status: SHIPPED | OUTPATIENT
Start: 2021-02-19 | End: 2022-06-29

## 2021-02-19 RX ORDER — CYCLOBENZAPRINE HCL 5 MG
5 TABLET ORAL 2 TIMES DAILY PRN
Qty: 10 TABLET | Refills: 0 | Status: SHIPPED | OUTPATIENT
Start: 2021-02-19 | End: 2021-03-01

## 2021-02-19 RX ORDER — PREDNISONE 20 MG/1
20 TABLET ORAL 2 TIMES DAILY
Qty: 10 TABLET | Refills: 0 | Status: SHIPPED | OUTPATIENT
Start: 2021-02-19 | End: 2021-02-24

## 2021-02-19 NOTE — PROGRESS NOTES
Back Pain    HPI:  Symptoms have been present for 4 day(s). she describes the pain as sharp, aching, pressure, radiating to buttock(s) on right, hip(s) on right, upper leg(s) on right, knee(s) on right  in the lumbar region. Inciting injury or history of trauma? Yes - started after shoveling snow  Pain is relieved by - laying down   Pain is aggravated by - standing and walking  Radiation of the pain? Yes - down th eback of her leg  Paresthesias of the extremities? No  Saddle anesthesia? No  Bowel or bladder incontinence? No  Treatments tried - nsaids    Encounter Diagnoses   Name Primary?  Lumbar pain Yes    Sciatica, unspecified laterality     Muscle spasm    prednisone,  Naprosyn  Flexeril sent in for pt  Also advised warm moist heat, massage and no lifting over 10-15 pounds for several day. Call office if condition worsens.

## 2021-04-12 RX ORDER — AMITRIPTYLINE HYDROCHLORIDE 10 MG/1
10 TABLET, FILM COATED ORAL NIGHTLY
Qty: 90 TABLET | Refills: 1 | Status: SHIPPED | OUTPATIENT
Start: 2021-04-12 | End: 2021-12-22 | Stop reason: SDUPTHER

## 2021-05-04 DIAGNOSIS — F41.9 ANXIETY: Primary | ICD-10-CM

## 2021-05-04 RX ORDER — LORAZEPAM 0.5 MG/1
0.5 TABLET ORAL
Qty: 4 TABLET | Refills: 0 | Status: SHIPPED | OUTPATIENT
Start: 2021-05-04 | End: 2021-05-04

## 2021-12-22 DIAGNOSIS — I10 ESSENTIAL HYPERTENSION: ICD-10-CM

## 2021-12-22 RX ORDER — AMITRIPTYLINE HYDROCHLORIDE 10 MG/1
10 TABLET, FILM COATED ORAL NIGHTLY
Qty: 90 TABLET | Refills: 1 | Status: SHIPPED | OUTPATIENT
Start: 2021-12-22 | End: 2022-08-29 | Stop reason: SDUPTHER

## 2021-12-22 RX ORDER — LISINOPRIL 5 MG/1
5 TABLET ORAL DAILY
Qty: 90 TABLET | Refills: 3 | Status: SHIPPED | OUTPATIENT
Start: 2021-12-22 | End: 2022-02-21 | Stop reason: SDUPTHER

## 2022-01-19 ENCOUNTER — E-VISIT (OUTPATIENT)
Dept: FAMILY MEDICINE CLINIC | Age: 42
End: 2022-01-19
Payer: COMMERCIAL

## 2022-01-19 DIAGNOSIS — R44.8 FACIAL PRESSURE: ICD-10-CM

## 2022-01-19 DIAGNOSIS — J01.00 ACUTE NON-RECURRENT MAXILLARY SINUSITIS: Primary | ICD-10-CM

## 2022-01-19 PROCEDURE — 99422 OL DIG E/M SVC 11-20 MIN: CPT | Performed by: NURSE PRACTITIONER

## 2022-01-19 RX ORDER — PSEUDOEPHEDRINE HYDROCHLORIDE 30 MG/1
30 TABLET ORAL EVERY 6 HOURS PRN
Qty: 30 TABLET | Refills: 1 | Status: SHIPPED | OUTPATIENT
Start: 2022-01-19 | End: 2022-02-18

## 2022-01-19 RX ORDER — AZITHROMYCIN 250 MG/1
250 TABLET, FILM COATED ORAL SEE ADMIN INSTRUCTIONS
Qty: 6 TABLET | Refills: 0 | Status: SHIPPED | OUTPATIENT
Start: 2022-01-19 | End: 2022-01-24

## 2022-01-19 ASSESSMENT — LIFESTYLE VARIABLES: SMOKING_STATUS: NO, I'VE NEVER SMOKED

## 2022-01-19 NOTE — PROGRESS NOTES
SUBJECTIVE:  Susanne Fabry is a 46 y.o. y/o female that presents with No chief complaint on file. Suni Griffiths HPI:      Symptoms have been present for 10 day(s). Symptoms are unchanged since they initially started. Fever? No  Runny nose or congestion? Yes   Cough? No  Sore throat? No  Headache, fatigue, joint pains, muscle aches? No  Shortness of breath/Wheezing? No  Nausea/Vomiting/Diarrhea? No  Double Sickening? No  Sick contacts? No  Known COVID exposures of RFs?  unknown  Smoker? No  Preexisting Respiratory conditions? Yes - has allergic rhinitis     Patient has tried dayquil and nyqil with out improvement. Past Medical History:   Diagnosis Date    Blood clot in vein     Blood in urine     Change in bowel habits     Chills     DVT (deep vein thrombosis) in pregnancy     Endometriosis     Fever     Hoarseness     Irregular periods     Liver disease     Migraines     Pulmonary emboli (HCC)     SOB (shortness of breath)        Social History     Socioeconomic History    Marital status:      Spouse name: Not on file    Number of children: Not on file    Years of education: Not on file    Highest education level: Not on file   Occupational History    Not on file   Tobacco Use    Smoking status: Never Smoker    Smokeless tobacco: Never Used   Vaping Use    Vaping Use: Never used   Substance and Sexual Activity    Alcohol use: Yes     Comment: occasional    Drug use: No    Sexual activity: Yes     Partners: Male   Other Topics Concern    Not on file   Social History Narrative    Not on file     Social Determinants of Health     Financial Resource Strain:     Difficulty of Paying Living Expenses: Not on file   Food Insecurity:     Worried About Running Out of Food in the Last Year: Not on file    Valente of Food in the Last Year: Not on file   Transportation Needs:     Lack of Transportation (Medical): Not on file    Lack of Transportation (Non-Medical):  Not on file   Physical Activity:     Days of Exercise per Week: Not on file    Minutes of Exercise per Session: Not on file   Stress:     Feeling of Stress : Not on file   Social Connections:     Frequency of Communication with Friends and Family: Not on file    Frequency of Social Gatherings with Friends and Family: Not on file    Attends [employer] Services: Not on file    Active Member of [address] [place] [address] or Organizations: Not on file    Attends Club or Organization Meetings: Not on file    Marital Status: Not on file   Intimate Partner Violence:     Fear of Current or Ex-Partner: Not on file    Emotionally Abused: Not on file    Physically Abused: Not on file    Sexually Abused: Not on file   Housing Stability:     Unable to Pay for Housing in the Last Year: Not on file    Number of [place] in the Last Year: Not on file    Unstable Housing in the Last Year: Not on file       Family History   Problem Relation Age of Onset    Early Death Neg Hx     Colon Cancer Neg Hx     Colon Polyps Neg Hx     Esophageal Cancer Neg Hx                ASSESSMENT & PLAN  Diagnoses and all orders for this visit:    Acute non-recurrent maxillary sinusitis  -     azithromycin (ZITHROMAX) 250 MG tablet; Take 1 tablet by mouth See Admin Instructions for 5 days 500mg on day 1 followed by 250mg on days 2 - 5  -     pseudoephedrine (DECONGESTANT) 30 MG tablet; Take 1 tablet by mouth every 6 hours as needed for Congestion    Facial pressure  -     azithromycin (ZITHROMAX) 250 MG tablet; Take 1 tablet by mouth See Admin Instructions for 5 days 500mg on day 1 followed by 250mg on days 2 - 5  -     pseudoephedrine (DECONGESTANT) 30 MG tablet;  Take 1 tablet by mouth every 6 hours as needed for Congestion        No follow-ups on file.     -Patient advised to call immediately or go to ER if any worsening of symptoms  -Patient counseled on conservative care including fluids, rest and OTC meds    Shivani received counseling on the following healthy behaviors: medication adherence  Reviewed prior labs and health maintenance. Continue current medications, diet and exercise. Discussed use, benefit, and side effects of prescribed medications. Barriers to medication compliance addressed. Patient given educational materials - see patient instructions. All patient questions answered. Patient voiced understanding. I have reviewed this patient's history, habits, and medication list and have updated the chart where appropriate.

## 2022-02-17 ENCOUNTER — OFFICE VISIT (OUTPATIENT)
Dept: FAMILY MEDICINE CLINIC | Age: 42
End: 2022-02-17
Payer: COMMERCIAL

## 2022-02-17 VITALS
DIASTOLIC BLOOD PRESSURE: 92 MMHG | HEART RATE: 73 BPM | RESPIRATION RATE: 14 BRPM | OXYGEN SATURATION: 98 % | SYSTOLIC BLOOD PRESSURE: 138 MMHG | TEMPERATURE: 98 F | WEIGHT: 254.6 LBS | BODY MASS INDEX: 45.11 KG/M2 | HEIGHT: 63 IN

## 2022-02-17 DIAGNOSIS — H69.81 DYSFUNCTION OF RIGHT EUSTACHIAN TUBE: ICD-10-CM

## 2022-02-17 DIAGNOSIS — R30.9 PAINFUL URINATION: Primary | ICD-10-CM

## 2022-02-17 DIAGNOSIS — R35.0 URINARY FREQUENCY: ICD-10-CM

## 2022-02-17 DIAGNOSIS — H65.191 ACUTE MEE (MIDDLE EAR EFFUSION), RIGHT: ICD-10-CM

## 2022-02-17 DIAGNOSIS — J30.89 NON-SEASONAL ALLERGIC RHINITIS DUE TO OTHER ALLERGIC TRIGGER: ICD-10-CM

## 2022-02-17 PROBLEM — J30.9 ALLERGIC RHINITIS DUE TO ALLERGEN: Status: ACTIVE | Noted: 2022-02-17

## 2022-02-17 LAB
BILIRUBIN URINE: NEGATIVE
BLOOD URINE, POC: NEGATIVE
CHARACTER, URINE: CLEAR
COLOR, URINE: YELLOW
GLUCOSE URINE: NEGATIVE MG/DL
KETONES, URINE: NEGATIVE
LEUKOCYTE CLUMPS, URINE: NEGATIVE
NITRITE, URINE: NEGATIVE
PH, URINE: 7 (ref 5–9)
PROTEIN, URINE: NEGATIVE MG/DL
SPECIFIC GRAVITY, URINE: 1.02 (ref 1–1.03)
UROBILINOGEN, URINE: 0.2 EU/DL (ref 0–1)

## 2022-02-17 PROCEDURE — 99213 OFFICE O/P EST LOW 20 MIN: CPT | Performed by: NURSE PRACTITIONER

## 2022-02-17 PROCEDURE — G8427 DOCREV CUR MEDS BY ELIG CLIN: HCPCS | Performed by: NURSE PRACTITIONER

## 2022-02-17 PROCEDURE — 1036F TOBACCO NON-USER: CPT | Performed by: NURSE PRACTITIONER

## 2022-02-17 PROCEDURE — G8417 CALC BMI ABV UP PARAM F/U: HCPCS | Performed by: NURSE PRACTITIONER

## 2022-02-17 PROCEDURE — G8484 FLU IMMUNIZE NO ADMIN: HCPCS | Performed by: NURSE PRACTITIONER

## 2022-02-17 RX ORDER — PREDNISONE 20 MG/1
20 TABLET ORAL 2 TIMES DAILY
Qty: 10 TABLET | Refills: 0 | Status: SHIPPED | OUTPATIENT
Start: 2022-02-17 | End: 2022-02-22

## 2022-02-17 RX ORDER — FLUTICASONE PROPIONATE 50 MCG
1 SPRAY, SUSPENSION (ML) NASAL DAILY
Qty: 32 G | Refills: 1 | Status: SHIPPED | OUTPATIENT
Start: 2022-02-17

## 2022-02-17 ASSESSMENT — PATIENT HEALTH QUESTIONNAIRE - PHQ9
SUM OF ALL RESPONSES TO PHQ QUESTIONS 1-9: 0
SUM OF ALL RESPONSES TO PHQ QUESTIONS 1-9: 0
SUM OF ALL RESPONSES TO PHQ9 QUESTIONS 1 & 2: 0
SUM OF ALL RESPONSES TO PHQ QUESTIONS 1-9: 0
2. FEELING DOWN, DEPRESSED OR HOPELESS: 0
SUM OF ALL RESPONSES TO PHQ QUESTIONS 1-9: 0
1. LITTLE INTEREST OR PLEASURE IN DOING THINGS: 0

## 2022-02-17 NOTE — PROGRESS NOTES
SUBJECTIVE:  Tierra Luque is a 46 y.o. female for   Chief Complaint   Patient presents with    Dysuria     cloudy urine. x3 days    Pharyngitis     started this morning. still having nasal drainage from previously. HPI:    Symptoms present for 4 days. Symptoms are unchanged since they initially started. Dysuria? She states it hurts when she urinates  Hematuria? No  Increased urinary frequency? Yes  Abdominal discomfort? No  CVA pain? No  Hx of UTIs? No    Has a female partner, is sexually active denies any vaginal discharge itching or burning. Patient Active Problem List   Diagnosis    Menorrhagia    Allergic rhinitis due to allergen       Ear Complaint    HPI:  Symptoms have been present for 2 week(s). Inciting incident or history of trauma? yes - she did have a URI that has been treated and right ear pressure and cracking has continued   Decreased hearing? Yes  Ear tenderness? No  Ear drainage? No  Feeling of fullness? Yes  Radiation of the pain? No  Dizziness or pre-syncope? No  Affected by position or head movment? No  Sore throat, rhinorrhea or sinus congestion? She has had a sore throat with postnasal drainage. Hx of Bruxism? No  Treatment tried and response - claritin and flonase       OBJECTIVE:  BP (!) 138/92   Pulse 73   Temp 98 °F (36.7 °C) (Oral)   Resp 14   Ht 5' 3\" (1.6 m)   Wt 254 lb 9.6 oz (115.5 kg)   SpO2 98%   BMI 45.10 kg/m²   She appears well; non-toxic and in no apparent distress. Physical Examination: Chest - clear to auscultation, no wheezes, rales or rhonchi, symmetric air entry    Right TM with fluid accumulation and bulge, no erythema no pain with manipulation of right pinna or tragus. Oropharynx without erythema.    No anterior cervical lymphadenopathy   Abdomen - soft, nontender, nondistended, no masses or organomegaly, no CVA tenderness  Extremities - peripheral pulses normal, no pedal edema, no clubbing or cyanosis  Psych -  Affect appropriate. Thought process is normal without evidence of depression or psychosis. Good insight and appropriae interaction. Cognition and memory appear to be intact. Henrietta Messer was seen today for dysuria and pharyngitis. Diagnoses and all orders for this visit:    Painful urination  Monitor  increase water intake and cranberry juice  Call if symptoms change  Urine clear today no S&s of infection. Urinary frequency  As above   Dysfunction of right eustachian tube  Prednisone  claritin  If no better in 3 months will refer to ENT  Acute LINK (middle ear effusion), right  As above   Non-seasonal allergic rhinitis due to other allergic trigger  flonase  Other orders  -     POCT Urinalysis No Micro (Auto)  -     predniSONE (DELTASONE) 20 MG tablet; Take 1 tablet by mouth 2 times daily for 5 days  -     fluticasone (FLONASE) 50 MCG/ACT nasal spray; 1 spray by Each Nostril route daily        Return if symptoms worsen or fail to improve.      -Start above treatments  -Urine culture was not sent today  -Patient advised to contact our office immediately if symptoms worsen or persist  -Patient counseled on conservative care including fluids, rest and OTC meds      All patient questions answered. Patient voiced understanding.

## 2022-02-21 DIAGNOSIS — I10 ESSENTIAL HYPERTENSION: ICD-10-CM

## 2022-02-21 RX ORDER — LISINOPRIL 5 MG/1
5 TABLET ORAL DAILY
Qty: 90 TABLET | Refills: 3 | Status: SHIPPED | OUTPATIENT
Start: 2022-02-21

## 2022-06-29 ENCOUNTER — HOSPITAL ENCOUNTER (OUTPATIENT)
Dept: GENERAL RADIOLOGY | Age: 42
Discharge: HOME | End: 2022-06-29
Payer: COMMERCIAL

## 2022-06-29 ENCOUNTER — TELEPHONE (OUTPATIENT)
Dept: FAMILY MEDICINE CLINIC | Age: 42
End: 2022-06-29

## 2022-06-29 ENCOUNTER — OFFICE VISIT (OUTPATIENT)
Dept: FAMILY MEDICINE CLINIC | Age: 42
End: 2022-06-29
Payer: COMMERCIAL

## 2022-06-29 ENCOUNTER — HOSPITAL ENCOUNTER (OUTPATIENT)
Age: 42
Discharge: HOME | End: 2022-06-29
Payer: COMMERCIAL

## 2022-06-29 VITALS
OXYGEN SATURATION: 96 % | DIASTOLIC BLOOD PRESSURE: 82 MMHG | WEIGHT: 254.4 LBS | RESPIRATION RATE: 18 BRPM | HEIGHT: 63 IN | HEART RATE: 82 BPM | SYSTOLIC BLOOD PRESSURE: 126 MMHG | TEMPERATURE: 97.4 F | BODY MASS INDEX: 45.07 KG/M2

## 2022-06-29 DIAGNOSIS — M79.641 PAIN IN BOTH HANDS: ICD-10-CM

## 2022-06-29 DIAGNOSIS — Z11.4 SCREENING FOR HIV (HUMAN IMMUNODEFICIENCY VIRUS): ICD-10-CM

## 2022-06-29 DIAGNOSIS — M54.50 LUMBAR SPINE PAIN: ICD-10-CM

## 2022-06-29 DIAGNOSIS — M79.642 PAIN IN BOTH HANDS: ICD-10-CM

## 2022-06-29 DIAGNOSIS — G89.29 CHRONIC SI JOINT PAIN: ICD-10-CM

## 2022-06-29 DIAGNOSIS — M25.50 POLYARTHRALGIA: ICD-10-CM

## 2022-06-29 DIAGNOSIS — M53.3 CHRONIC SI JOINT PAIN: ICD-10-CM

## 2022-06-29 DIAGNOSIS — M95.9 BONE DEFORMITY: ICD-10-CM

## 2022-06-29 DIAGNOSIS — M25.50 POLYARTHRALGIA: Primary | ICD-10-CM

## 2022-06-29 DIAGNOSIS — Z86.718 HISTORY OF DVT (DEEP VEIN THROMBOSIS): ICD-10-CM

## 2022-06-29 PROCEDURE — 1036F TOBACCO NON-USER: CPT | Performed by: NURSE PRACTITIONER

## 2022-06-29 PROCEDURE — 73070 X-RAY EXAM OF ELBOW: CPT

## 2022-06-29 PROCEDURE — 90715 TDAP VACCINE 7 YRS/> IM: CPT | Performed by: NURSE PRACTITIONER

## 2022-06-29 PROCEDURE — G8417 CALC BMI ABV UP PARAM F/U: HCPCS | Performed by: NURSE PRACTITIONER

## 2022-06-29 PROCEDURE — 72202 X-RAY EXAM SI JOINTS 3/> VWS: CPT

## 2022-06-29 PROCEDURE — 73120 X-RAY EXAM OF HAND: CPT

## 2022-06-29 PROCEDURE — G8427 DOCREV CUR MEDS BY ELIG CLIN: HCPCS | Performed by: NURSE PRACTITIONER

## 2022-06-29 PROCEDURE — 72170 X-RAY EXAM OF PELVIS: CPT

## 2022-06-29 PROCEDURE — 99214 OFFICE O/P EST MOD 30 MIN: CPT | Performed by: NURSE PRACTITIONER

## 2022-06-29 PROCEDURE — 72110 X-RAY EXAM L-2 SPINE 4/>VWS: CPT

## 2022-06-29 PROCEDURE — 90471 IMMUNIZATION ADMIN: CPT | Performed by: NURSE PRACTITIONER

## 2022-06-29 RX ORDER — PREDNISONE 20 MG/1
TABLET ORAL
Qty: 10 TABLET | Refills: 0 | Status: SHIPPED | OUTPATIENT
Start: 2022-06-29 | End: 2022-06-29 | Stop reason: SDUPTHER

## 2022-06-29 RX ORDER — PREDNISONE 20 MG/1
TABLET ORAL
Qty: 18 TABLET | Refills: 0 | Status: SHIPPED | OUTPATIENT
Start: 2022-06-29

## 2022-06-29 RX ORDER — CYCLOBENZAPRINE HCL 5 MG
5 TABLET ORAL 2 TIMES DAILY PRN
Qty: 10 TABLET | Refills: 0 | Status: SHIPPED | OUTPATIENT
Start: 2022-06-29 | End: 2022-07-09

## 2022-06-29 SDOH — ECONOMIC STABILITY: FOOD INSECURITY: WITHIN THE PAST 12 MONTHS, YOU WORRIED THAT YOUR FOOD WOULD RUN OUT BEFORE YOU GOT MONEY TO BUY MORE.: NEVER TRUE

## 2022-06-29 SDOH — ECONOMIC STABILITY: FOOD INSECURITY: WITHIN THE PAST 12 MONTHS, THE FOOD YOU BOUGHT JUST DIDN'T LAST AND YOU DIDN'T HAVE MONEY TO GET MORE.: NEVER TRUE

## 2022-06-29 ASSESSMENT — SOCIAL DETERMINANTS OF HEALTH (SDOH): HOW HARD IS IT FOR YOU TO PAY FOR THE VERY BASICS LIKE FOOD, HOUSING, MEDICAL CARE, AND HEATING?: NOT HARD AT ALL

## 2022-06-29 NOTE — PATIENT INSTRUCTIONS
Patient Education        Learning About How to Have a Healthy Back  What causes back pain? Back pain is often caused by overuse, strain, or injury. For example, people often hurt their backs playing sports or working in the yard, being jolted in acar accident, or lifting something too heavy. Aging plays a part too. Your bones and muscles tend to lose strength as you age, which makes injury more likely. The spongy discs between the bones of the spine (vertebrae) may suffer from wear and tear and no longer provide enough cushion between the bones. A disc that bulges or breaks open (herniated disc)can press on nerves, causing back pain. In some people, back pain is the result of arthritis, broken vertebrae causedby bone loss (osteoporosis), illness, or a spine problem. Although most people have back pain at one time or another, there are steps youcan take to make it less likely. How can you have a healthy back? Reduce stress on your back through good posture   Slumping or slouching alone may not cause low back pain. But after the back hasbeen strained or injured, bad posture can make pain worse.  Sleep in a position that maintains your back's normal curves and on a mattress that feels comfortable. Sleep on your side with a pillow between your knees, or sleep on your back with a pillow under your knees. These positions can reduce strain on your back.  Stand and sit up straight. \"Good posture\" generally means your ears, shoulders, and hips are in a straight line.  If you must stand for a long time, put one foot on a stool, ledge, or box. Switch feet every now and then.  Sit in a chair that is low enough to let you place both feet flat on the floor with both knees nearly level with your hips. If your chair or desk is too high, use a footrest to raise your knees. Place a small pillow, a rolled-up towel, or a lumbar roll in the curve of your back if you need extra support.    Try a kneeling chair, which helps tilt your hips forward. This takes pressure off your lower back.  Try sitting on an exercise ball. It can rock from side to side, which helps keep your back loose.  When driving, keep your knees nearly level with your hips. Sit straight, and drive with both hands on the steering wheel. Your arms should be in a slightly bent position. Reduce stress on your back through careful lifting    Squat down, bending at the hips and knees only. If you need to, put one knee to the floor and extend your other knee in front of you, bent at a right angle (half kneeling).  Press your chest straight forward. This helps keep your upper back straight while keeping a slight arch in your low back.  Hold the load as close to your body as possible, at the level of your belly button (navel).  Use your feet to change direction, taking small steps.  Lead with your hips as you change direction. Keep your shoulders in line with your hips as you move.  Set down your load carefully, squatting with your knees and hips only. Exercise and stretch your back    Do some exercise on most days of the week, if your doctor says it is okay. You can walk, run, swim, or cycle.  Stretch your back muscles. Here are a few exercises to try:  ? Lie on your back, and gently pull one bent knee to your chest. Put that foot back on the floor, and then pull the other knee to your chest.  ? Do pelvic tilts. Lie on your back with your knees bent. Tighten your stomach muscles. Pull your belly button (navel) in and up toward your ribs. You should feel like your back is pressing to the floor and your hips and pelvis are slightly lifting off the floor. Hold for 6 seconds while breathing smoothly. ? Sit with your back flat against a wall.  Keep your core muscles strong. The muscles of your back, belly (abdomen), and buttocks support your spine. ? Pull in your belly and imagine pulling your navel toward your spine.  Hold this for 6 seconds, then relax. Remember to keep breathing normally as you tense your muscles. ? Do curl-ups. Always do them with your knees bent. Keep your low back on the floor, and curl your shoulders toward your knees using a smooth, slow motion. Keep your arms folded across your chest. If this bothers your neck, try putting your hands behind your neck (not your head), with your elbows spread apart. ? Lie on your back with your knees bent and your feet flat on the floor. Tighten your belly muscles, and then push with your feet and raise your buttocks up a few inches. Hold this position 6 seconds as you continue to breathe normally, then lower yourself slowly to the floor. Repeat 8 to 12 times. ? If you like group exercise, try Pilates or yoga. These classes have poses that strengthen the core muscles. Lead a healthy lifestyle    Stay at a healthy weight to avoid strain on your back.  Do not smoke. Smoking increases the risk of osteoporosis, which weakens the spine. If you need help quitting, talk to your doctor about stop-smoking programs and medicines. These can increase your chances of quitting for good. Where can you learn more? Go to https://FlipkartpeTotal Booxeb.Hipcricket. org and sign in to your Incuvo account. Enter L315 in the Sooqini box to learn more about \"Learning About How to Have a Healthy Back. \"     If you do not have an account, please click on the \"Sign Up Now\" link. Current as of: March 9, 2022               Content Version: 13.3  © 2006-2022 Healthwise, Incorporated. Care instructions adapted under license by [facility] [place] ([facility]). If you have questions about a medical condition or this instruction, always ask your healthcare professional. [place] [address] any warranty or liability for your use of this information. Patient Education        Back Stretches: Exercises  Introduction  Here are some examples of exercises for stretching your back. Start eachexercise slowly.  Ease off the exercise if you start to have pain. Your doctor or physical therapist will tell you when you can start theseexercises and which ones will work best for you. How to do the exercises  Overhead stretch    1. Stand comfortably with your feet shoulder-width apart. 2. Looking straight ahead, raise both arms over your head and reach toward the ceiling. Do not allow your head to tilt back. 3. Hold for 15 to 30 seconds, then lower your arms to your sides. 4. Repeat 2 to 4 times. Side stretch    1. Stand comfortably with your feet shoulder-width apart. 2. Raise one arm over your head, and then lean to the other side. 3. Slide your hand down your leg as you let the weight of your arm gently stretch your side muscles. Hold for 15 to 30 seconds. 4. Repeat 2 to 4 times on each side. Press-up    1. Lie on your stomach, supporting your body with your forearms. 2. Press your elbows down into the floor to raise your upper back. As you do this, relax your stomach muscles and allow your back to arch without using your back muscles. As your press up, do not let your hips or pelvis come off the floor. 3. Hold for 15 to 30 seconds, then relax. 4. Repeat 2 to 4 times. Relax and rest    1. Lie on your back with a rolled towel under your neck and a pillow under your knees. Extend your arms comfortably to your sides. 2. Relax and breathe normally. 3. Remain in this position for about 10 minutes. 4. If you can, do this 2 or 3 times each day. Follow-up care is a key part of your treatment and safety. Be sure to make and go to all appointments, and call your doctor if you are having problems. It's also a good idea to know your test results and keep alist of the medicines you take. Where can you learn more? Go to https://Excellence Engineeringsameb.ixigo. org and sign in to your wildcraft account. Enter P343 in the Indian Energy box to learn more about \"Back Stretches: Exercises. \"     If you do not have an account, please click on the \"Sign Up Now\" link. Current as of: March 9, 2022               Content Version: 13.3  © 4659-7749 Healthwise, Incorporated. Care instructions adapted under license by [facility] ([facility]). If you have questions about a medical condition or this instruction, always ask your healthcare professional. [address] any warranty or liability for your use of this information.

## 2022-06-29 NOTE — PROGRESS NOTES
Tanvir Lopez is a 46 y.o. female that present for   Chief Complaint   Patient presents with    Lower Back Pain    Pain     in joints worse the past 6 months         Subjective  Patient has chronic pain of the lumbar spine, bilateral hips, fingers and wrists. Present for: approximately 1 year but getting worse  Current Medications:    Pain control: adequate  Escalation of dosage recently? No pt is not currently on pain medications. Has tired ibuprofen in the past with some relief. Evidence for abuse or diversion? no   Tobacco use?: no    Seen specialist or pain clinic?: no    Pain contract: no      OBJECTIVE:  /82   Pulse 82   Temp 97.4 °F (36.3 °C) (Infrared)   Resp 18   Ht 5' 3\" (1.6 m)   Wt 254 lb 6.4 oz (115.4 kg)   SpO2 96%   BMI 45.06 kg/m²   Physical Examination: General appearance - alert, well appearing, and in no distress  Neck - supple, no significant adenopathy  Chest - clear to auscultation, no wheezes, rales or rhonchi, symmetric air entry  Heart - normal rate, regular rhythm, normal S1, S2, no murmurs, rubs, clicks or gallops  Musculoskeletal - bilateral lumbar pain with palpation, bilateral lateral hip pain with palpation. Back exam - limited range of motion, pain with motion noted during exam, tenderness noted with palpation of bilateral SI joints and lateral hip trochanter area, negative straight-leg raise bilaterally , normal reflexes and strength bilateral lower extremities, sensory exam intact bilateral lower extremities, Straight leg raise - negative b/l, 5/5 strength globally and symmetrically in the LEs, 2+ patellar reflexes b/l    Left elbow with bony joint deformity but no redness or warmth. Or edema. Paraspinal muscle tenderness with palpation of thoracic and cervical spine. Pt states she has had a few blood clots 4 years ago behind her knee and RLE following a surgery, was on xarelto for 18 months and then it was stopped.  States her mother has MTHFR along with her daughter. She did have an IVC filter but now they have taken it out. She will take tylenol for her pain, it helps. She tries to exercise but can't due to pain. No bony joint deformity of phalanges but pain with palpation of bilateral DIP joints of phalanges no redness or warmth of any joints. Extremities - peripheral pulses normal, no pedal edema, no clubbing or cyanosis, no pedal edema noted    ASSESSMENT & PLAN  Jluis Cruz was seen today for lower back pain and pain. Diagnoses and all orders for this visit:    Polyarthralgia  R/O RA vs fibromyalgia  Pt in agreement with plan   Lumbar stretches given to pt  Will consider PT if labs are negative   -     PAMELA Screen with Reflex; Future  -     C-Reactive Protein; Future  -     Sedimentation Rate; Future  -     Anti SSA; Future  -     Anti SSB; Future  -     Anti-DNA Antibody, Double-Stranded; Future  -     Anti-RNP (IRON Ab); Future  -     Sauer (IRON) Antibody IgG; Future  -     Cyclic Citrul Peptide Antibody, IgG; Future  -     Rheumatoid Factor; Future  -     CBC with Auto Differential; Future  -     Basic Metabolic Panel; Future  -     Hepatic Function Panel; Future  -     TSH With Reflex Ft4; Future  -     XR HAND RIGHT (2 VIEWS); Future  -     XR HAND LEFT (2 VIEWS); Future  -     predniSONE (DELTASONE) 20 MG tablet; 1 tab po tid for 3 days 1 tab po bid for 3 days 1 tab po once day for 3 days    History of DVT (deep vein thrombosis)  -     Factor 5 Activity; Future  -     Factor 7 Activity; Future  -     MTHFR Mutation [id number]/[id number]; Future  R/o clotting deficiency   Lumbar spine pain  R/o compression fracture vs muscular pain  -     XR LUMBAR SPINE (MIN 4 VIEWS); Future  -     XR PELVIS (MIN 3 VIEWS); Future  -     predniSONE (DELTASONE) 20 MG tablet; 1 tab po tid for 3 days 1 tab po bid for 3 days 1 tab po once day for 3 days    Pain in both hands  As above   -     PAMELA Screen with Reflex; Future  -     C-Reactive Protein;  Future  - Sedimentation Rate; Future  -     Anti SSA; Future  -     Anti SSB; Future  -     Anti-DNA Antibody, Double-Stranded; Future  -     Anti-RNP (IRON Ab); Future  -     Sauer (IRON) Antibody IgG; Future  -     Cyclic Citrul Peptide Antibody, IgG; Future  -     Rheumatoid Factor; Future  -     XR HAND RIGHT (2 VIEWS); Future  -     XR HAND LEFT (2 VIEWS); Future  -     predniSONE (DELTASONE) 20 MG tablet; 1 tab po tid for 3 days 1 tab po bid for 3 days 1 tab po once day for 3 days    Chronic SI joint pain  -     C-Reactive Protein; Future  -     Sedimentation Rate; Future  -     XR PELVIS (MIN 3 VIEWS); Future  -     XR SACROILIAC JOINTS (MIN 3 VIEWS); Future  -     predniSONE (DELTASONE) 20 MG tablet; 1 tab po tid for 3 days 1 tab po bid for 3 days 1 tab po once day for 3 days    Bone deformity  -     XR ELBOW LEFT (2 VIEWS); Future    Screening for HIV (human immunodeficiency virus)  -     HIV Screen; Future    BMI 45.0-49.9, adult [facility])  -     Basic Metabolic Panel; Future  -     Hepatic Function Panel; Future  -     TSH With Reflex Ft4; Future  R/o thyroid disorder  Advised to decrease caloric intake to 150 calories daily   Other orders  -     Tdap, BOOSTRIX, (age 8 yrs+), IM  -     cyclobenzaprine (FLEXERIL) 5 MG tablet; Take 1 tablet by mouth 2 times daily as needed for Muscle spasms    Fibromyalgia score rating positive , if other testing is negative will consider adding cymbalta to regimen. Pt aware of plan. Return in about 4 weeks (around 7/27/2022) for f/u on joint pain.

## 2022-06-30 ENCOUNTER — TELEPHONE (OUTPATIENT)
Dept: FAMILY MEDICINE CLINIC | Age: 42
End: 2022-06-30

## 2022-06-30 NOTE — TELEPHONE ENCOUNTER
----- Message from REECE Crowell CNP sent at 6/30/2022  8:12 AM EDT -----  Let pt know I have reviewed all of her x-rays, her SI and pelvis x-ray shows very mild degenerative changes (arthritis) . Her hand x-rays are negative for any bone erosions or arthritis. Her elbow x-ray shows a bone spur at the site of her elbow deformity. Likely a result of her past accident. There is nothing to be done with this unless she wants it surgically removed. So, none of her x-rays identify any significant bone abnormalities causing her pain. I will get back with her when her labs are in. Let me know if she has any questions.

## 2022-07-28 ENCOUNTER — TELEMEDICINE (OUTPATIENT)
Dept: FAMILY MEDICINE CLINIC | Age: 42
End: 2022-07-28
Payer: COMMERCIAL

## 2022-07-28 DIAGNOSIS — J40 BRONCHITIS: ICD-10-CM

## 2022-07-28 DIAGNOSIS — J34.89 SINUS PAIN: ICD-10-CM

## 2022-07-28 DIAGNOSIS — H92.03 OTALGIA OF BOTH EARS: ICD-10-CM

## 2022-07-28 DIAGNOSIS — U07.1 LAB TEST POSITIVE FOR DETECTION OF COVID-19 VIRUS: Primary | ICD-10-CM

## 2022-07-28 DIAGNOSIS — H65.193 ACUTE EFFUSION OF BOTH MIDDLE EARS: ICD-10-CM

## 2022-07-28 DIAGNOSIS — M79.10 MYALGIA: ICD-10-CM

## 2022-07-28 PROCEDURE — 99214 OFFICE O/P EST MOD 30 MIN: CPT | Performed by: NURSE PRACTITIONER

## 2022-07-28 RX ORDER — PREDNISONE 20 MG/1
20 TABLET ORAL 2 TIMES DAILY
Qty: 10 TABLET | Refills: 0 | Status: SHIPPED | OUTPATIENT
Start: 2022-07-28 | End: 2022-08-02

## 2022-07-28 RX ORDER — BENZONATATE 100 MG/1
100-200 CAPSULE ORAL 3 TIMES DAILY PRN
Qty: 60 CAPSULE | Refills: 0 | Status: SHIPPED | OUTPATIENT
Start: 2022-07-28 | End: 2022-08-04

## 2022-07-28 RX ORDER — AZITHROMYCIN 250 MG/1
250 TABLET, FILM COATED ORAL SEE ADMIN INSTRUCTIONS
Qty: 6 TABLET | Refills: 0 | Status: SHIPPED | OUTPATIENT
Start: 2022-07-28 | End: 2022-08-02

## 2022-07-28 RX ORDER — ALBUTEROL SULFATE 90 UG/1
2 AEROSOL, METERED RESPIRATORY (INHALATION) EVERY 6 HOURS PRN
Qty: 18 G | Refills: 3 | Status: SHIPPED | OUTPATIENT
Start: 2022-07-28

## 2022-07-28 ASSESSMENT — ENCOUNTER SYMPTOMS
WHEEZING: 1
SORE THROAT: 1
SHORTNESS OF BREATH: 0
SINUS PAIN: 1
COUGH: 1
CHEST TIGHTNESS: 1
SINUS PRESSURE: 1

## 2022-07-28 NOTE — PROGRESS NOTES
2022    TELEHEALTH EVALUATION -- Audio/Visual (During FCIBD-60 public health emergency)    HPI:    Geraldine Sharp (:  1980) has requested an audio/video evaluation for the following concern(s):    Visit conducted with pt at home and provider North Bernal CNP in office     URI Symptoms    HPI:      Symptoms have been present for 10 day(s). Symptoms are better since they initially started. Fever? Yes - in the beginning but not for the last 2 days   Runny nose or congestion? Yes   Cough? Yes  Sore throat? Yes  Headache, fatigue, joint pains, muscle aches? Yes  Shortness of breath/Wheezing? Yes - she does get winded if she gets up and walks around, wheezing   Nausea/Vomiting/Diarrhea? No  Double Sickening? No  Sick contacts? Yes tested positive 5 days ago for covid had been to [facility]     Patient has tried advil and sinus tabs with out improvement. out    Review of Systems   Constitutional:  Positive for fatigue and fever. HENT:  Positive for congestion, ear pain, sinus pressure, sinus pain, sneezing and sore throat. Respiratory:  Positive for cough, chest tightness and wheezing. Negative for shortness of breath. Cardiovascular: Negative. Genitourinary:  Negative for difficulty urinating. Skin: Negative. Neurological:  Positive for headaches. Negative for dizziness and facial asymmetry. Prior to Visit Medications    Medication Sig Taking? Authorizing Provider   benzonatate (TESSALON) 100 MG capsule Take 1-2 capsules by mouth 3 times daily as needed for Cough Yes REECE Colbert CNP   azithromycin (ZITHROMAX) 250 MG tablet Take 1 tablet by mouth See Admin Instructions for 5 days 500mg on day 1 followed by 250mg on days 2 - 5 Yes REECE Colbert CNP   predniSONE (DELTASONE) 20 MG tablet Take 1 tablet by mouth in the morning and 1 tablet before bedtime. Do all this for 5 days.  Yes REECE Colbert CNP   albuterol sulfate HFA (PROVENTIL HFA) 108 (90 Base) MCG/ACT inhaler Inhale 2 puffs into the lungs every 6 hours as needed for Wheezing Yes Mormon LuzREECE shelton CNP   predniSONE (DELTASONE) 20 MG tablet 1 tab po tid for 3 days 1 tab po bid for 3 days 1 tab po once day for 3 days  REECE Galarza CNP   lisinopril (PRINIVIL;ZESTRIL) 5 MG tablet Take 1 tablet by mouth daily  REECE Galarza CNP   fluticasone (FLONASE) 50 MCG/ACT nasal spray 1 spray by Each Nostril route daily  Mormon LuzREECE shelton CNP   amitriptyline (ELAVIL) 10 MG tablet Take 1 tablet by mouth nightly  Patient not taking: Reported on 2022  [place] REECE Beodya CNP   SUMAtriptan (IMITREX) 25 MG tablet Take 1 tablet by mouth at onset of headache, may repeat in 1 hour if no better. Not to exceed two doses in 24 hours.   Patient not taking: Reported on 2022  Temple PastREECE shelton CNP   loratadine (CLARITIN) 10 MG tablet Take 1 tablet by mouth daily  REECE Galarza CNP   Azelaic Acid 15 % GEL Apply 1 applicator topically See Admin Instructions  [place] Pastures, APRN - CNP   B Complex Vitamins (VITAMIN-B COMPLEX PO) Take by mouth daily  Historical Provider, MD       Social History     Tobacco Use    Smoking status: Never    Smokeless tobacco: Never   Vaping Use    Vaping Use: Never used   Substance Use Topics    Alcohol use: Yes     Comment: occasional    Drug use: No        No Known Allergies,   Past Medical History:   Diagnosis Date    Blood clot in vein     Blood in urine     Change in bowel habits     Chills     DVT (deep vein thrombosis) in pregnancy     Endometriosis     Fever     Hoarseness     Irregular periods     Liver disease     Migraines     Pulmonary emboli (HCC)     SOB (shortness of breath)    ,   Past Surgical History:   Procedure Laterality Date    ANKLE SURGERY Right     achillies     SECTION      DILATION AND CURETTAGE OF UTERUS N/A 2019    DILATATION AND CURETTAGE HYSTEROSCOPY performed by Nieves Lyons MD at [address] [place] [address] FILTER INSERTION      removed march 2019    OTHER SURGICAL HISTORY      ivc filter    UPPER GASTROINTESTINAL ENDOSCOPY  12/28/2019       ,   Social History     Tobacco Use    Smoking status: Never    Smokeless tobacco: Never   Vaping Use    Vaping Use: Never used   Substance Use Topics    Alcohol use: Yes     Comment: occasional    Drug use: No   ,   Family History   Problem Relation Age of Onset    Early Death Neg Hx     Colon Cancer Neg Hx     Colon Polyps Neg Hx     Esophageal Cancer Neg Hx    ,   Immunization History   Administered Date(s) Administered    COVID-19, PFIZER PURPLE top, DILUTE for use, (age 15 y+), 30mcg/0.3mL 09/29/2021, 10/20/2021    Tdap (Boostrix, Adacel) 06/29/2022   ,   Health Maintenance   Topic Date Due    HIV screen  Never done    Diabetes screen  Never done    Cervical cancer screen  06/20/2019    COVID-19 Vaccine (3 - Booster for Pfizer series) 03/20/2022    Flu vaccine (1) 09/01/2022    Depression Screen  02/17/2023    DTaP/Tdap/Td vaccine (2 - Td or Tdap) 06/29/2032    Hepatitis C screen  Completed    Hepatitis A vaccine  Aged Out    Hepatitis B vaccine  Aged Out    Hib vaccine  Aged Out    Meningococcal (ACWY) vaccine  Aged Out    Pneumococcal 0-64 years Vaccine  Aged Out    Varicella vaccine  Discontinued       PHYSICAL EXAMINATION:  [ INSTRUCTIONS:  \"[x]\" Indicates a positive item  \"[]\" Indicates a negative item  -- DELETE ALL ITEMS NOT EXAMINED]  Vital Signs: (As obtained by patient/caregiver or practitioner observation)    Blood pressure-  Heart rate-    Respiratory rate-    Temperature-  Pulse oximetry-     Constitutional: [] Appears well-developed and well-nourished [] No apparent distress      [x] Abnormal- appears sick but not in distress  Mental status  [x] Alert and awake  [x] Oriented to person/place/time [x]Able to follow commands      Eyes:  EOM    []  Normal  [] Abnormal-  Sclera  []  Normal  [] Abnormal -         Discharge []  None visible  [] Abnormal -    HENT: [] Normocephalic, atraumatic. [] Abnormal   [x] Mouth/Throat: Mucous membranes are moist.     External Ears [x] Normal  [] Abnormal-     Neck: [] No visualized mass     Pulmonary/Chest: [x] Respiratory effort normal.  [] No visualized signs of difficulty breathing or respiratory distress        [x] Abnormal- pt with harsh cough during visit, able to breathe easily but coughing frequently      Musculoskeletal:   [] Normal gait with no signs of ataxia         [] Normal range of motion of neck        [] Abnormal-       Neurological:        [] No Facial Asymmetry (Cranial nerve 7 motor function) (limited exam to video visit)          [] No gaze palsy        [] Abnormal-         Skin:        [x] No significant exanthematous lesions or discoloration noted on facial skin         [] Abnormal-            Psychiatric:       [] Normal Affect [] No Hallucinations        [] Abnormal-     Other pertinent observable physical exam findings-     ASSESSMENT/PLAN:  1. Lab test positive for detection of COVID-19 virus  Increase fluids  Ibuprofen, vitamin c, d, and zinc  Educated on possibilities of covid turning into pneumonia and/or sinusitis  - azithromycin (ZITHROMAX) 250 MG tablet; Take 1 tablet by mouth See Admin Instructions for 5 days 500mg on day 1 followed by 250mg on days 2 - 5  Dispense: 6 tablet; Refill: 0    2. Acute effusion of both middle ears  prednisone   - azithromycin (ZITHROMAX) 250 MG tablet; Take 1 tablet by mouth See Admin Instructions for 5 days 500mg on day 1 followed by 250mg on days 2 - 5  Dispense: 6 tablet; Refill: 0    3. Bronchitis  #1  Prednisone and albuterol sent in  Advised to get pulse ox an check oxygen saturation  Call for any level at 91% or lower. Pt in agreement with plan   - azithromycin (ZITHROMAX) 250 MG tablet; Take 1 tablet by mouth See Admin Instructions for 5 days 500mg on day 1 followed by 250mg on days 2 - 5  Dispense: 6 tablet; Refill: 0    4. Myalgia  Ibuprofen     5.  Otalgia of both ears  #2    6. Sinus pain  Zpak  Has likely evolved into sinus infection. Return if symptoms worsen or fail to improve. Heraclio Caro, was evaluated through a synchronous (real-time) audio-video encounter. The patient (or guardian if applicable) is aware that this is a billable service, which includes applicable co-pays. This Virtual Visit was conducted with patient's (and/or legal guardian's) consent. The visit was conducted pursuant to the emergency declaration under the [address] [place] [address], [address] [place] [address] authority and the [employer] Act. Patient identification was verified, and a caregiver was present when appropriate. The patient was located at Home: [address] [place] [address]  [address] [facility]. Provider was located at [facility] ([address] [place], [place] [address]t): [address] [facility].  KATHERIN BARRETO II.[facility]. Total time spent on this encounter: Not billed by time    --Iline Phalen, APRN - CNP on 7/28/2022 at 9:22 AM    An electronic signature was used to authenticate this note.

## 2022-08-19 LAB — SEDIMENTATION RATE, ERYTHROCYTE: 53 MM/HR (ref 0–20)

## 2022-08-20 LAB
ABSOLUTE BASO #: 0.04 K/UL (ref 0–0.2)
ABSOLUTE EOS #: 0.15 K/UL (ref 0–0.5)
ABSOLUTE LYMPH #: 2.77 K/UL (ref 1–4)
ABSOLUTE MONO #: 0.45 K/UL (ref 0.2–1)
ABSOLUTE NEUT #: 4.89 K/UL (ref 1.5–7.5)
ALBUMIN SERPL-MCNC: 4 G/DL (ref 3.5–5.2)
ALK PHOSPHATASE: 95 U/L (ref 40–112)
ALT SERPL-CCNC: 43 U/L (ref 5–40)
ANION GAP SERPL CALCULATED.3IONS-SCNC: 11 MEQ/L (ref 7–16)
AST SERPL-CCNC: 39 U/L (ref 9–40)
BASOPHILS RELATIVE PERCENT: 0.5 %
BILIRUB SERPL-MCNC: 0.4 MG/DL
BILIRUBIN DIRECT: <0.2 MG/DL (ref 0–0.3)
BUN BLDV-MCNC: 10 MG/DL (ref 6–20)
C-REACTIVE PROTEIN: <3 G/DL
CALCIUM SERPL-MCNC: 9 MG/DL (ref 8.5–10.5)
CHLORIDE BLD-SCNC: 105 MEQ/L (ref 95–107)
CO2: 21 MEQ/L (ref 19–31)
CREAT SERPL-MCNC: 0.81 MG/DL (ref 0.6–1.3)
EGFR IF NONAFRICAN AMERICAN: 95 ML/MIN/1.73
EOSINOPHILS RELATIVE PERCENT: 1.8 %
GLUCOSE: 116 MG/DL (ref 70–99)
HCT VFR BLD CALC: 40.3 % (ref 34–45)
HEMOGLOBIN: 13.5 G/DL (ref 11.5–15.5)
HIV 1,2 COMBO ANTIGEN/ANTIBODY: NORMAL
LYMPHOCYTE %: 33.3 %
MCH RBC QN AUTO: 29.3 PG (ref 25–33)
MCHC RBC AUTO-ENTMCNC: 33.5 G/DL (ref 31–36)
MCV RBC AUTO: 87.6 FL (ref 80–99)
MONOCYTES # BLD: 5.4 %
NEUTROPHILS RELATIVE PERCENT: 58.6 %
PDW BLD-RTO: 13.4 % (ref 11.5–15)
PLATELETS: 341 K/UL (ref 130–400)
PMV BLD AUTO: 9.8 FL (ref 9.3–13)
POTASSIUM SERPL-SCNC: 4.5 MEQ/L (ref 3.5–5.4)
RBC: 4.6 M/UL (ref 3.8–5.4)
RHEUMATOID FACTOR: <10 IU/ML
SODIUM BLD-SCNC: 137 MEQ/L (ref 133–146)
TOTAL PROTEIN: 6.9 G/DL (ref 6.1–8.3)
TSH SERPL DL<=0.05 MIU/L-ACNC: 2.09 UIU/ML (ref 0.4–4.1)
WBC: 8.3 K/UL (ref 3.5–11)

## 2022-08-21 LAB — DNASE B ANTIBODY: 178 U/ML (ref 0–260)

## 2022-08-22 LAB
MTHFR MUTATION A1298C: NEGATIVE
MTHFR MUTATION C677T: ABNORMAL

## 2022-08-23 LAB
FACTOR V ACTIVITY: 89 % (ref 62–140)
FACTOR VII ACTIVITY: 161 % (ref 80–181)

## 2022-08-24 LAB
ANA PATTERN: NORMAL
ANTI-NUCLEAR ANTIBODY (ANA): NEGATIVE
CCP IGG ANTIBODIES: <0.5 U/ML
CENTROMERE PATTERN: NEGATIVE TITER
COARSE SPECKLED PATTERN: NEGATIVE TITER
COMMENTS: NORMAL
CYTO RETICULAR (MITO) PATTERN: NEGATIVE
DENSE FINE SPECKLED PATTERN: NEGATIVE TITER
DISCRETE NUCLEAR DOTS PATTERN: NEGATIVE TITER
ENA TO SMITH (SM) ANTIBODY IGG: <0.2 AI
HOMOGENEOUS PATTERN: NEGATIVE TITER
METHOD: NORMAL
NUCLEAR MEMBRANE PATTERN: NEGATIVE TITER
NUCLEOLAR PATTERN: NEGATIVE TITER
RNP AB, IGG: 0.2 AI
SJOGREN'S ANTIBODIES (SSA): <0.2 AI
SJOGREN'S ANTIBODIES (SSB): <0.2 AI
SPECKLED PATTERN: NEGATIVE TITER

## 2022-08-25 ENCOUNTER — TELEPHONE (OUTPATIENT)
Dept: FAMILY MEDICINE CLINIC | Age: 42
End: 2022-08-25

## 2022-08-25 NOTE — TELEPHONE ENCOUNTER
----- Message from REECE Klein - CNP sent at 8/25/2022  1:44 PM EDT -----  Let pt know all of her lab work is coming back negative for RA or lupus , her sed rate, an inflammation marker is a little elevated both other markers are normal. This could be elevated due to   a recent injury. Her fasting sugar is a little elevated at 116 and normal range is up to 100. I recommend she monitor the sugar in her diet and work on increasing physical activity to bring down weight and sugar levels. If the elevated sugar levels continue ti could lead to diabetes. Let me know if she has any questions.

## 2022-08-29 ENCOUNTER — OFFICE VISIT (OUTPATIENT)
Dept: FAMILY MEDICINE CLINIC | Age: 42
End: 2022-08-29
Payer: COMMERCIAL

## 2022-08-29 VITALS
WEIGHT: 256.6 LBS | OXYGEN SATURATION: 96 % | HEART RATE: 83 BPM | HEIGHT: 63 IN | SYSTOLIC BLOOD PRESSURE: 132 MMHG | RESPIRATION RATE: 16 BRPM | DIASTOLIC BLOOD PRESSURE: 84 MMHG | TEMPERATURE: 98.2 F | BODY MASS INDEX: 45.46 KG/M2

## 2022-08-29 DIAGNOSIS — M79.7 FIBROMYALGIA: ICD-10-CM

## 2022-08-29 DIAGNOSIS — M25.50 POLYARTHRALGIA: ICD-10-CM

## 2022-08-29 DIAGNOSIS — G43.109 OCULAR MIGRAINE: Primary | ICD-10-CM

## 2022-08-29 DIAGNOSIS — R06.83 SNORES: ICD-10-CM

## 2022-08-29 DIAGNOSIS — G47.00 INSOMNIA, UNSPECIFIED TYPE: ICD-10-CM

## 2022-08-29 PROCEDURE — 99214 OFFICE O/P EST MOD 30 MIN: CPT | Performed by: NURSE PRACTITIONER

## 2022-08-29 PROCEDURE — G8417 CALC BMI ABV UP PARAM F/U: HCPCS | Performed by: NURSE PRACTITIONER

## 2022-08-29 PROCEDURE — 1036F TOBACCO NON-USER: CPT | Performed by: NURSE PRACTITIONER

## 2022-08-29 PROCEDURE — G8427 DOCREV CUR MEDS BY ELIG CLIN: HCPCS | Performed by: NURSE PRACTITIONER

## 2022-08-29 RX ORDER — DULOXETIN HYDROCHLORIDE 30 MG/1
30 CAPSULE, DELAYED RELEASE ORAL DAILY
Qty: 30 CAPSULE | Refills: 3 | Status: SHIPPED | OUTPATIENT
Start: 2022-08-29

## 2022-08-29 RX ORDER — AMITRIPTYLINE HYDROCHLORIDE 25 MG/1
25 TABLET, FILM COATED ORAL NIGHTLY
Qty: 90 TABLET | Refills: 1 | Status: SHIPPED | OUTPATIENT
Start: 2022-08-29

## 2022-08-29 ASSESSMENT — PATIENT HEALTH QUESTIONNAIRE - PHQ9
2. FEELING DOWN, DEPRESSED OR HOPELESS: 0
1. LITTLE INTEREST OR PLEASURE IN DOING THINGS: 0
SUM OF ALL RESPONSES TO PHQ9 QUESTIONS 1 & 2: 0
SUM OF ALL RESPONSES TO PHQ QUESTIONS 1-9: 0

## 2022-08-29 NOTE — PROGRESS NOTES
Nikolas Alan is a 46 y.o. female that present for   Chief Complaint   Patient presents with    Follow-up     2 month follow up for joint pain. Pain has not changed      Pt here to f/u on polyarthralgia, we reviewed pt labs and they are not indicative of RA. X-rays reviewed ad identify mild arthritis. Pt is on elavil 10 mg at night nad continues to have widespread polyarthralgia along with muscle pain. Pt fibro questionairre reviewed and pt does qualify as a fibromyalgia diagnosis Will start on cymbalta. Pt also admits to not slepeing well at night, does snore. State pain can be 7/10 also has migraine H/A had an ocular migraine H/a a few weeks ago where she could not see  colors , it did resovle. Has had eye exam recently. Subjective  Patient has chronic pain of the lumbar spine, bilateral hips, fingers and wrists. Present for: approximately 1 year but getting worse  Current Medications:    Pain control: adequate  Escalation of dosage recently? No pt is not currently on pain medications. Has tired ibuprofen in the past with some relief. Evidence for abuse or diversion? no   Tobacco use?: no    Seen specialist or pain clinic?: no    Pain contract: no      OBJECTIVE:  /84   Pulse 83   Temp 98.2 °F (36.8 °C)   Resp 16   Ht 5' 3\" (1.6 m)   Wt 256 lb 9.6 oz (116.4 kg)   LMP 08/29/2022 (Exact Date)   SpO2 96%   BMI 45.45 kg/m²   Physical Examination: General appearance - alert, well appearing, and in no distress  Neck - supple, no significant adenopathy  Chest - clear to auscultation, no wheezes, rales or rhonchi, symmetric air entry  Heart - normal rate, regular rhythm, normal S1, S2, no murmurs, rubs, clicks or gallops  Musculoskeletal - bilateral lumbar pain with palpation, bilateral lateral hip pain with palpation.     Back exam - limited range of motion, pain with motion noted during exam, tenderness noted with palpation of bilateral SI joints and lateral hip trochanter area, negative straight-leg raise bilaterally , normal reflexes and strength bilateral lower extremities, sensory exam intact bilateral lower extremities, Straight leg raise - negative b/l, 5/5 strength globally and symmetrically in the LEs, 2+ patellar reflexes b/l    Left elbow with bony joint deformity but no redness or warmth. Or edema. Paraspinal muscle tenderness with palpation of thoracic and cervical spine. Pt states she has had a few blood clots 4 years ago behind her knee and RLE following a surgery, was on xarelto for 18 months and then it was stopped. States her mother has MTHFR along with her daughter. She did have an IVC filter but now they have taken it out. She will take tylenol for her pain, it helps. She tries to exercise but can't due to pain. No bony joint deformity of phalanges but pain with palpation of bilateral DIP joints of phalanges no redness or warmth of any joints. Extremities - peripheral pulses normal, no pedal edema, no clubbing or cyanosis, no pedal edema noted    ASSESSMENT & PLAN  Elissa Parsons was seen today for lower back pain and pain. Diagnoses and all orders for this visit:  Encounter Diagnoses   Name Primary? Ocular migraine Yes    Snores     Insomnia, unspecified type     Polyarthralgia     Fibromyalgia         Orders Placed This Encounter   Procedures    [address] [facility] [address]      Will start cymbalta for fibromyalgia  Will also refer to Rheumatology  Increase elavil to 25 mg at night for insomnia and r/o sleep apnea  Pt in agreement with plan    Moderate level if decision making noted. Fibromyalgia score rating positive , if other testing is negative will consider adding cymbalta to regimen. Pt aware of plan. Return in about 2 months (around 10/29/2022) for f/u fibromyalgia .

## 2022-11-03 ENCOUNTER — TELEPHONE (OUTPATIENT)
Dept: FAMILY MEDICINE CLINIC | Age: 42
End: 2022-11-03

## 2022-11-03 ENCOUNTER — E-VISIT (OUTPATIENT)
Dept: FAMILY MEDICINE CLINIC | Age: 42
End: 2022-11-03
Payer: COMMERCIAL

## 2022-11-03 DIAGNOSIS — B35.9 TINEA: Primary | ICD-10-CM

## 2022-11-03 PROCEDURE — 99421 OL DIG E/M SVC 5-10 MIN: CPT | Performed by: NURSE PRACTITIONER

## 2022-11-03 RX ORDER — ULTRAMICROSIZE GRISEOFULVIN 250 MG/1
250 TABLET ORAL DAILY
Qty: 14 TABLET | Refills: 0 | Status: SHIPPED | OUTPATIENT
Start: 2022-11-03 | End: 2022-11-17

## 2022-11-03 NOTE — TELEPHONE ENCOUNTER
Mother asked for ringworm treatment for daughter Omar Cooper, please get pt birth date so I can order meds. Also let her know I did send in treatment for her other daughter Li Araujo.

## 2022-11-04 NOTE — TELEPHONE ENCOUNTER
----- Message from Suzanne Mcelroy sent at 2022 11:18 AM EDT -----  Subject: Message to Provider    QUESTIONS  Information for Provider? Pt states that she had a vv with her pcp   yesterday and that she was calling in medication for her and her children,   but the pcp called back and stated that she needed her youngest daughters    The youngest daughter is Ladarius Paris and her YOB: 2011. Please call pt back if there is anything more you need   ---------------------------------------------------------------------------  --------------  Shyam DELGADO  [phone]; OK to leave message on voicemail  ---------------------------------------------------------------------------  --------------  SCRIPT ANSWERS  Relationship to Patient?  Self

## 2022-11-04 NOTE — TELEPHONE ENCOUNTER
In review of Manuel Osorio's chart, she has not established with me as a patient so I can not order oral medications for her . Mother can use a topical antifungal cream on her lesions such  as lotrimin and see if that will clear up her rash. If not she can be seen in the office.

## 2022-11-29 ENCOUNTER — HOSPITAL ENCOUNTER (EMERGENCY)
Age: 42
Discharge: HOME | End: 2022-11-29
Attending: EMERGENCY MEDICINE
Payer: COMMERCIAL

## 2022-11-29 ENCOUNTER — APPOINTMENT (OUTPATIENT)
Dept: GENERAL RADIOLOGY | Age: 42
End: 2022-11-29
Payer: COMMERCIAL

## 2022-11-29 VITALS
BODY MASS INDEX: 39.87 KG/M2 | TEMPERATURE: 97.7 F | SYSTOLIC BLOOD PRESSURE: 172 MMHG | OXYGEN SATURATION: 98 % | RESPIRATION RATE: 16 BRPM | DIASTOLIC BLOOD PRESSURE: 97 MMHG | HEIGHT: 63 IN | HEART RATE: 88 BPM | WEIGHT: 225 LBS

## 2022-11-29 DIAGNOSIS — R07.9 CHEST PAIN, UNSPECIFIED TYPE: Primary | ICD-10-CM

## 2022-11-29 LAB
ANION GAP SERPL CALCULATED.3IONS-SCNC: 13 MEQ/L (ref 8–16)
BASOPHILS # BLD: 0.4 %
BASOPHILS ABSOLUTE: 0 THOU/MM3 (ref 0–0.1)
BUN BLDV-MCNC: 12 MG/DL (ref 7–22)
CALCIUM SERPL-MCNC: 9.4 MG/DL (ref 8.5–10.5)
CHLORIDE BLD-SCNC: 101 MEQ/L (ref 98–111)
CO2: 24 MEQ/L (ref 23–33)
CREAT SERPL-MCNC: 0.7 MG/DL (ref 0.4–1.2)
EKG ATRIAL RATE: 80 BPM
EKG P AXIS: 31 DEGREES
EKG P-R INTERVAL: 170 MS
EKG Q-T INTERVAL: 398 MS
EKG QRS DURATION: 88 MS
EKG QTC CALCULATION (BAZETT): 459 MS
EKG R AXIS: 29 DEGREES
EKG T AXIS: 1 DEGREES
EKG VENTRICULAR RATE: 80 BPM
EOSINOPHIL # BLD: 2.2 %
EOSINOPHILS ABSOLUTE: 0.2 THOU/MM3 (ref 0–0.4)
ERYTHROCYTE [DISTWIDTH] IN BLOOD BY AUTOMATED COUNT: 13.2 % (ref 11.5–14.5)
ERYTHROCYTE [DISTWIDTH] IN BLOOD BY AUTOMATED COUNT: 44.3 FL (ref 35–45)
GFR SERPL CREATININE-BSD FRML MDRD: > 60 ML/MIN/1.73M2
GLUCOSE BLD-MCNC: 103 MG/DL (ref 70–108)
HCT VFR BLD CALC: 43 % (ref 37–47)
HEMOGLOBIN: 14 GM/DL (ref 12–16)
IMMATURE GRANS (ABS): 0.03 THOU/MM3 (ref 0–0.07)
IMMATURE GRANULOCYTES: 0.3 %
LYMPHOCYTES # BLD: 36.5 %
LYMPHOCYTES ABSOLUTE: 3.6 THOU/MM3 (ref 1–4.8)
MCH RBC QN AUTO: 29.8 PG (ref 26–33)
MCHC RBC AUTO-ENTMCNC: 32.6 GM/DL (ref 32.2–35.5)
MCV RBC AUTO: 91.5 FL (ref 81–99)
MONOCYTES # BLD: 5.7 %
MONOCYTES ABSOLUTE: 0.6 THOU/MM3 (ref 0.4–1.3)
NUCLEATED RED BLOOD CELLS: 0 /100 WBC
OSMOLALITY CALCULATION: 275.7 MOSMOL/KG (ref 275–300)
PLATELET # BLD: 363 THOU/MM3 (ref 130–400)
PMV BLD AUTO: 9.1 FL (ref 9.4–12.4)
POTASSIUM SERPL-SCNC: 4.6 MEQ/L (ref 3.5–5.2)
RBC # BLD: 4.7 MILL/MM3 (ref 4.2–5.4)
SEG NEUTROPHILS: 54.9 %
SEGMENTED NEUTROPHILS ABSOLUTE COUNT: 5.4 THOU/MM3 (ref 1.8–7.7)
SODIUM BLD-SCNC: 138 MEQ/L (ref 135–145)
TROPONIN T: < 0.01 NG/ML
WBC # BLD: 9.9 THOU/MM3 (ref 4.8–10.8)

## 2022-11-29 PROCEDURE — 71046 X-RAY EXAM CHEST 2 VIEWS: CPT

## 2022-11-29 PROCEDURE — 93005 ELECTROCARDIOGRAM TRACING: CPT | Performed by: STUDENT IN AN ORGANIZED HEALTH CARE EDUCATION/TRAINING PROGRAM

## 2022-11-29 PROCEDURE — 99285 EMERGENCY DEPT VISIT HI MDM: CPT

## 2022-11-29 PROCEDURE — 85025 COMPLETE CBC W/AUTO DIFF WBC: CPT

## 2022-11-29 PROCEDURE — 6370000000 HC RX 637 (ALT 250 FOR IP): Performed by: STUDENT IN AN ORGANIZED HEALTH CARE EDUCATION/TRAINING PROGRAM

## 2022-11-29 PROCEDURE — 84484 ASSAY OF TROPONIN QUANT: CPT

## 2022-11-29 PROCEDURE — 36415 COLL VENOUS BLD VENIPUNCTURE: CPT

## 2022-11-29 PROCEDURE — 93010 ELECTROCARDIOGRAM REPORT: CPT | Performed by: NUCLEAR MEDICINE

## 2022-11-29 PROCEDURE — 80048 BASIC METABOLIC PNL TOTAL CA: CPT

## 2022-11-29 PROCEDURE — 99215 OFFICE O/P EST HI 40 MIN: CPT

## 2022-11-29 RX ORDER — ASPIRIN 81 MG/1
324 TABLET, CHEWABLE ORAL ONCE
Status: COMPLETED | OUTPATIENT
Start: 2022-11-29 | End: 2022-11-29

## 2022-11-29 RX ORDER — NITROGLYCERIN 0.4 MG/1
0.4 TABLET SUBLINGUAL EVERY 5 MIN PRN
Status: DISCONTINUED | OUTPATIENT
Start: 2022-11-29 | End: 2022-11-29 | Stop reason: HOSPADM

## 2022-11-29 RX ADMIN — ASPIRIN 324 MG: 81 TABLET, CHEWABLE ORAL at 16:43

## 2022-11-29 ASSESSMENT — PAIN DESCRIPTION - ORIENTATION: ORIENTATION: LEFT

## 2022-11-29 ASSESSMENT — PAIN DESCRIPTION - ONSET: ONSET: GRADUAL

## 2022-11-29 ASSESSMENT — ENCOUNTER SYMPTOMS
SINUS PAIN: 0
COUGH: 0
RHINORRHEA: 0
NAUSEA: 0
SORE THROAT: 0
DIARRHEA: 0
VOMITING: 0
BLOOD IN STOOL: 0
SHORTNESS OF BREATH: 0
EYE PAIN: 0
BACK PAIN: 0
SHORTNESS OF BREATH: 1
CHEST TIGHTNESS: 1
WHEEZING: 0
ABDOMINAL PAIN: 0
EYE REDNESS: 0
CONSTIPATION: 0

## 2022-11-29 ASSESSMENT — PAIN SCALES - GENERAL
PAINLEVEL_OUTOF10: 0
PAINLEVEL_OUTOF10: 4
PAINLEVEL_OUTOF10: 4

## 2022-11-29 ASSESSMENT — PAIN - FUNCTIONAL ASSESSMENT
PAIN_FUNCTIONAL_ASSESSMENT: 0-10
PAIN_FUNCTIONAL_ASSESSMENT: 0-10

## 2022-11-29 ASSESSMENT — HEART SCORE: ECG: 1

## 2022-11-29 ASSESSMENT — PAIN DESCRIPTION - FREQUENCY: FREQUENCY: INTERMITTENT

## 2022-11-29 ASSESSMENT — PAIN DESCRIPTION - PAIN TYPE: TYPE: ACUTE PAIN

## 2022-11-29 ASSESSMENT — PAIN DESCRIPTION - LOCATION: LOCATION: CHEST

## 2022-11-29 NOTE — ED PROVIDER NOTES
[place] ENCOUNTER          Pt Name: Kala Reed  MRN: [id number]  [place] 1980  Date of evaluation: 11/29/2022  Treating Resident Physician: Ranjan Otto MD  Supervising Physician: Dr Silke Pierre       Chief Complaint   Patient presents with    Chest Pain     Tight- Radiates into left shoulder, arm, left shoulder blade     Hypertension     History obtained from the patient. HISTORY OF PRESENT ILLNESS    HPI  Kala Reed is a 38 y.o. female with pmhx pulmonary emboli, DVT in pregnancy who presents to the emergency department for evaluation of waxing and waning left-sided chest pressure rating down her left arm and occasionally into her right arm and shoulder. Worsens with exertion. Nonpleuritic. Denies any associated nausea vomiting or diaphoresis. No recent URI-like symptoms. 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 The patient has no other acute complaints at this time. REVIEW OF SYSTEMS   Review of Systems   Constitutional:  Negative for chills and fever. HENT:  Negative for rhinorrhea, sinus pain and sore throat. Eyes:  Negative for redness. Respiratory:  Positive for shortness of breath. Negative for cough. Cardiovascular:  Positive for chest pain. Gastrointestinal:  Negative for abdominal pain, diarrhea, nausea and vomiting. Genitourinary:  Negative for dysuria. Musculoskeletal:  Negative for back pain. Skin:  Negative for rash. Neurological:  Negative for light-headedness and headaches. Psychiatric/Behavioral:  Negative for agitation.         PAST MEDICAL AND SURGICAL HISTORY     Past Medical History:   Diagnosis Date    Blood clot in vein     Blood in urine     Change in bowel habits     Chills     DVT (deep vein thrombosis) in pregnancy     Endometriosis     Fever     Hoarseness     Irregular periods     Liver disease     Migraines     Pulmonary emboli (HCC)     SOB (shortness of breath)      Past Surgical History:   Procedure Laterality Date    ANKLE SURGERY Right     achillies     SECTION      DILATION AND CURETTAGE OF UTERUS N/A 2019    DILATATION AND CURETTAGE HYSTEROSCOPY performed by Keyshawn Diaz MD at [facility]      removed 2019    OTHER SURGICAL HISTORY      ivc filter    UPPER GASTROINTESTINAL ENDOSCOPY  2019             MEDICATIONS     Current Facility-Administered Medications:     nitroGLYCERIN (NITROSTAT) SL tablet 0.4 mg, 0.4 mg, SubLINGual, Q5 Min PRN, Ana Ellington MD    Current Outpatient Medications:     amitriptyline (ELAVIL) 25 MG tablet, Take 1 tablet by mouth nightly, Disp: 90 tablet, Rfl: 1    DULoxetine (CYMBALTA) 30 MG extended release capsule, Take 1 capsule by mouth daily, Disp: 30 capsule, Rfl: 3    albuterol sulfate HFA (PROVENTIL HFA) 108 (90 Base) MCG/ACT inhaler, Inhale 2 puffs into the lungs every 6 hours as needed for Wheezing, Disp: 18 g, Rfl: 3    predniSONE (DELTASONE) 20 MG tablet, 1 tab po tid for 3 days 1 tab po bid for 3 days 1 tab po once day for 3 days, Disp: 18 tablet, Rfl: 0    lisinopril (PRINIVIL;ZESTRIL) 5 MG tablet, Take 1 tablet by mouth daily, Disp: 90 tablet, Rfl: 3    fluticasone (FLONASE) 50 MCG/ACT nasal spray, 1 spray by Each Nostril route daily, Disp: 32 g, Rfl: 1    SUMAtriptan (IMITREX) 25 MG tablet, Take 1 tablet by mouth at onset of headache, may repeat in 1 hour if no better. Not to exceed two doses in 24 hours.  (Patient not taking: Reported on 2022), Disp: 9 tablet, Rfl: 3    loratadine (CLARITIN) 10 MG tablet, Take 1 tablet by mouth daily, Disp: 30 tablet, Rfl: 3    Azelaic Acid 15 % GEL, Apply 1 applicator topically See Admin Instructions, Disp: 1 Tube, Rfl: 5    B Complex Vitamins (VITAMIN-B COMPLEX PO), Take by mouth daily, Disp: , Rfl:       SOCIAL HISTORY     Social History     Social History Narrative    Not on file     Social History     Tobacco Use    Smoking status: Never    Smokeless tobacco: Never   Vaping Use    Vaping Use: Never used   Substance Use Topics    Alcohol use: Yes     Comment: occasional    Drug use: No         ALLERGIES   No Known Allergies      FAMILY HISTORY     Family History   Problem Relation Age of Onset    Early Death Neg Hx     Colon Cancer Neg Hx     Colon Polyps Neg Hx     Esophageal Cancer Neg Hx          PREVIOUS RECORDS   Previous records reviewed: Patient was seen on 8/20/2022 for ocular migraine. PHYSICAL EXAM     ED Triage Vitals [11/29/22 1326]   BP Temp Temp Source Heart Rate Resp SpO2 Height Weight   (!) 177/96 98.3 °F (36.8 °C) Oral 93 18 98 % 5' 3\" (1.6 m) 225 lb (102.1 kg)     Initial vital signs and nursing assessment reviewed and abnormal from hypertensive . Pulsoximetry is normal per my interpretation. Additional Vital Signs:  Vitals:    11/29/22 1405   BP: (!) 172/97   Pulse: 88   Resp: 16   Temp: 97.7 °F (36.5 °C)   SpO2: 98%       Physical Exam  Vitals and nursing note reviewed. Constitutional:       General: She is not in acute distress. Appearance: She is not ill-appearing or toxic-appearing. HENT:      Head: Normocephalic and atraumatic. Right Ear: External ear normal.      Left Ear: External ear normal.      Nose: Nose normal.      Mouth/Throat:      Mouth: Mucous membranes are moist.      Pharynx: Oropharynx is clear. Eyes:      General: No scleral icterus. Conjunctiva/sclera: Conjunctivae normal.   Cardiovascular:      Rate and Rhythm: Normal rate and regular rhythm. Pulses: Normal pulses. Heart sounds: Normal heart sounds. No murmur heard. Comments: Radial pulses are 2+ bilaterally. Pulmonary:      Effort: Pulmonary effort is normal. No respiratory distress.       Breath sounds: Normal breath sounds. Abdominal:      General: Abdomen is flat. There is no distension. Palpations: Abdomen is soft. Tenderness: There is no abdominal tenderness. There is no guarding or rebound. Musculoskeletal:         General: Normal range of motion. Cervical back: Normal range of motion and neck supple. No rigidity. No muscular tenderness. Right lower leg: No edema. Left lower leg: No edema. Lymphadenopathy:      Cervical: No cervical adenopathy. Skin:     General: Skin is warm and dry. Capillary Refill: Capillary refill takes less than 2 seconds. Coloration: Skin is not jaundiced. Neurological:      General: No focal deficit present. Mental Status: She is alert and oriented to person, place, and time. Psychiatric:         Mood and Affect: Mood normal.         Behavior: Behavior normal.       MEDICAL DECISION MAKING      Differential Diagnosis Considered but not limited to:   ACS, musculoskeletal, pneumonia,     Work up performed: CBC, troponin, BNP, EKG, chest x-ray, asa    Assessment:   Patient symptoms are following stressful events of black Friday where she is holding onto tension in upper shoulders and back. She describes her pain as pressure-like which is very different than her pain she had with her DVT improvement in symptom pregnancy. She has had no recent long flights and long car rides no recent surgeries no hormonal therapy. Heart score is 3. EKG shows ventricular rate 80, parable 170, QRS 88, QTc 459 nonspecific T wave abnormality in V3 and lead III. Currently without chest pain.         ED RESULTS   Laboratory results:  Labs Reviewed   CBC WITH AUTO DIFFERENTIAL - Abnormal; Notable for the following components:       Result Value    MPV 9.1 (*)     All other components within normal limits   BASIC METABOLIC PANEL   TROPONIN   GLOMERULAR FILTRATION RATE, ESTIMATED   ANION GAP   OSMOLALITY       Radiologic studies results:  XR CHEST (2 VW)   Final Result Stable radiographic appearance of the chest. No evidence of an acute process. **This report has been created using voice recognition software. It may contain minor errors which are inherent in voice recognition technology. **      Final report electronically signed by Dr. Rae Gaston on 11/29/2022 3:43 PM          ED Medications administered this visit:   Medications   nitroGLYCERIN (NITROSTAT) SL tablet 0.4 mg (has no administration in time range)   aspirin chewable tablet 324 mg (324 mg Oral Given 11/29/22 1643)         ED COURSE     ED Course as of 11/29/22 1701   Tue Nov 29, 2022   1608 XR CHEST (2 VW)  \"IMPRESSION:  Stable radiographic appearance of the chest. No evidence of an acute process. \" [AL]   7875 Troponin T: < 0.010 [AL]   1608 Sodium: 138 [AL]   1608 Potassium: 4.6 [AL]   1608 Chloride: 101 [AL]   1608 Creatinine: 0.7 [AL]   1608 WBC: 9.9 [AL]   1608 Hemoglobin Quant: 14.0 [AL]   1608 Platelet Count: 065 [AL]      ED Course User Index  [AL] Myrna Price MD     Strict return precautions and follow up instructions were discussed with the patient prior to discharge, with which the patient agrees. MEDICATION CHANGES     New Prescriptions    No medications on file         FINAL DISPOSITION     Final diagnoses:   Chest pain, unspecified type     Condition: condition: good  Dispo: Discharge to home      This transcription was electronically signed. Parts of this transcriptions may have been dictated by use of voice recognition software and electronically transcribed, and parts may have been transcribed with the assistance of an ED scribe. The transcription may contain errors not detected in proofreading. Please refer to my supervising physician's documentation if my documentation differs.     Electronically Signed: Myrna Price MD, 11/29/22, 5:01 PM         Myrna Price MD  Resident  11/29/22 4952

## 2022-11-29 NOTE — ED PROVIDER NOTES
[address] [facility] [address] [address] EMERGENCY DEPT  [facility]ly [facility]       Chief Complaint   Patient presents with    Chest Pain     Tight- Radiates into left shoulder, arm, left shoulder blade         Nurses Notes reviewed and I agree except as noted in the HPI. HISTORY OF PRESENT ILLNESS   Niru Roque is a 38 y.o. female who presents to urgent care with complaint of chest tightness that radiates down her left arm. She states this has been ongoing since Thursday, but has come and gone in that time. She states that rest does relieve her chest pain and activity does exacerbate it. She states that she does have high blood pressure, but it is normally well controlled with her lisinopril. She states that her blood pressure has been elevated throughout this as well. Patient denies shortness of breath, nausea, diaphoresis, dizziness or numbness or tingling. Patient does state that she feels cold which is abnormal for her. REVIEW OF SYSTEMS     Review of Systems   Constitutional:  Negative for appetite change, chills, fatigue, fever and unexpected weight change. HENT:  Negative for ear pain and rhinorrhea. Eyes:  Negative for pain and visual disturbance. Respiratory:  Positive for chest tightness. Negative for cough, shortness of breath and wheezing. Cardiovascular:  Positive for chest pain. Negative for palpitations and leg swelling. Gastrointestinal:  Negative for abdominal pain, blood in stool, constipation, diarrhea, nausea and vomiting. Genitourinary:  Negative for dysuria, frequency and hematuria. Musculoskeletal:  Negative for arthralgias, joint swelling and neck stiffness. Skin:  Negative for rash. Neurological:  Negative for dizziness, syncope, weakness, light-headedness and headaches. Hematological:  Does not bruise/bleed easily.      PAST MEDICAL HISTORY         Diagnosis Date    Blood clot in vein     Blood in urine     Change in bowel habits     Chills     DVT (deep vein thrombosis) in pregnancy     Endometriosis     Fever     Hoarseness     Irregular periods     Liver disease     Migraines     Pulmonary emboli (HCC)     SOB (shortness of breath)        SURGICAL HISTORY     Patient  has a past surgical history that includes  section; other surgical history; Ankle surgery (Right); IVC filter insertion; Dilation and curettage of uterus (N/A, 2019); and Upper gastrointestinal endoscopy (2019). CURRENT MEDICATIONS       Previous Medications    ALBUTEROL SULFATE HFA (PROVENTIL HFA) 108 (90 BASE) MCG/ACT INHALER    Inhale 2 puffs into the lungs every 6 hours as needed for Wheezing    AMITRIPTYLINE (ELAVIL) 25 MG TABLET    Take 1 tablet by mouth nightly    AZELAIC ACID 15 % GEL    Apply 1 applicator topically See Admin Instructions    B COMPLEX VITAMINS (VITAMIN-B COMPLEX PO)    Take by mouth daily    DULOXETINE (CYMBALTA) 30 MG EXTENDED RELEASE CAPSULE    Take 1 capsule by mouth daily    FLUTICASONE (FLONASE) 50 MCG/ACT NASAL SPRAY    1 spray by Each Nostril route daily    LISINOPRIL (PRINIVIL;ZESTRIL) 5 MG TABLET    Take 1 tablet by mouth daily    LORATADINE (CLARITIN) 10 MG TABLET    Take 1 tablet by mouth daily    PREDNISONE (DELTASONE) 20 MG TABLET    1 tab po tid for 3 days 1 tab po bid for 3 days 1 tab po once day for 3 days    SUMATRIPTAN (IMITREX) 25 MG TABLET    Take 1 tablet by mouth at onset of headache, may repeat in 1 hour if no better. Not to exceed two doses in 24 hours. ALLERGIES     Patient is has No Known Allergies. FAMILY HISTORY     Patient'sfamily history is not on file. SOCIAL HISTORY     Patient  reports that she has never smoked. She has never used smokeless tobacco. She reports current alcohol use. She reports that she does not use drugs. PHYSICAL EXAM     ED TRIAGE VITALS  BP: (!) 177/96, Temp: 98.3 °F (36.8 °C), Heart Rate: 93, Resp: 18, SpO2: 98 %  Physical Exam  Vitals and nursing note reviewed.    Constitutional: Appearance: She is well-developed. HENT:      Head: Normocephalic and atraumatic. Eyes:      Conjunctiva/sclera: Conjunctivae normal.      Pupils: Pupils are equal, round, and reactive to light. Cardiovascular:      Rate and Rhythm: Normal rate and regular rhythm. Heart sounds: Normal heart sounds. No murmur heard. No gallop. Pulmonary:      Effort: Pulmonary effort is normal. No respiratory distress. Breath sounds: Normal breath sounds. No wheezing or rales. Abdominal:      General: Bowel sounds are normal.      Palpations: Abdomen is soft. Musculoskeletal:         General: Normal range of motion. Cervical back: Normal range of motion. Skin:     General: Skin is warm and dry. Neurological:      Mental Status: She is alert and oriented to person, place, and time. DIAGNOSTIC RESULTS   Labs:No results found for this visit on 11/29/22. IMAGING:  No orders to display     URGENT CARE COURSE:        MDM      Patient presents to urgent care with complaint of chest tightness that radiates down her left arm. Discussion was had with patient and her symptoms requiring a different work-up than what can be provided at this facility. Discussed that her EKG does not look like an acute MI at this time, but she needs further work-up to rule out any cardiac involvement. Patient is agreeable with this plan. She would like to go to [facility] ER. Phone report was called to [employer] [facility] and they have accepted patient for transfer. Patient request that she transfer port herself. Recommendation of ambulance transport was declined. Medications - No data to display  PROCEDURES:    Procedures    FINALIMPRESSION      1.  Chest pain, unspecified type        DISPOSITION/PLAN   DISPOSITION      PATIENT REFERRED TO:  [place] [id number] [facility] [id number]  Go to     DISCHARGE MEDICATIONS:  New Prescriptions    No medications on file     Current Discharge Medication List Melissa Briggs, APRN - BAN Briggs, REECE - CNP  11/29/22 3413

## 2022-11-29 NOTE — ED TRIAGE NOTES
Arrives to [facility] for the evaluation of left sided chest tightness that radiates into the left shoulder and left arm. Also has pain in the left shoulder blade. This has been intermittent since 11/24/22 while at [facility]. States her BP has also been elevated. Today is 177/96. Did take Lisinopril this morning. 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 Face is red and flushed. Denies headache, dizziness, nausea, vomiting. Has SOB on exertion. Reports Hx of DVT but denies PE. Tightness in chest is rated 4/10 in severity. Respirations unlabored at this time. EKG completed on arrival.  Waiting provider to assess at this time.

## 2022-11-29 NOTE — PROGRESS NOTES
Patient released in stable condition. Instructed to go directly to [facility] emergency department for further evaluation and treatment. Patient verbalized understanding. Ambulated, per self, to private car.

## 2022-11-29 NOTE — ED TRIAGE NOTES
Patient to ER from [facility] for chest pain and high blood pressure. Patient states they told her her EKG was good but that her BP was high. Patient takes medication daily for her BP and took this morning. Patient states she did not take anything for her chest pain at urgent care. Her pain is a 4/10 which is unchanged.

## 2022-11-29 NOTE — DISCHARGE INSTRUCTIONS
Take your medications as prescribed. Follow-up with your primary care physician in the next day as scheduled. Return to the emerged department for any new or worsening symptoms.

## 2022-11-30 ENCOUNTER — TELEPHONE (OUTPATIENT)
Dept: FAMILY MEDICINE CLINIC | Age: 42
End: 2022-11-30

## 2022-11-30 ENCOUNTER — OFFICE VISIT (OUTPATIENT)
Dept: FAMILY MEDICINE CLINIC | Age: 42
End: 2022-11-30
Payer: COMMERCIAL

## 2022-11-30 VITALS
HEIGHT: 63 IN | BODY MASS INDEX: 44.76 KG/M2 | HEART RATE: 86 BPM | RESPIRATION RATE: 16 BRPM | OXYGEN SATURATION: 98 % | SYSTOLIC BLOOD PRESSURE: 148 MMHG | WEIGHT: 252.6 LBS | DIASTOLIC BLOOD PRESSURE: 96 MMHG | TEMPERATURE: 98.1 F

## 2022-11-30 DIAGNOSIS — R07.89 OTHER CHEST PAIN: ICD-10-CM

## 2022-11-30 DIAGNOSIS — M79.602 LEFT ARM PAIN: ICD-10-CM

## 2022-11-30 DIAGNOSIS — Z86.718 HISTORY OF DVT (DEEP VEIN THROMBOSIS): ICD-10-CM

## 2022-11-30 DIAGNOSIS — M79.10 MYALGIA: ICD-10-CM

## 2022-11-30 DIAGNOSIS — I10 PRIMARY HYPERTENSION: Primary | ICD-10-CM

## 2022-11-30 DIAGNOSIS — I10 ESSENTIAL HYPERTENSION: ICD-10-CM

## 2022-11-30 PROCEDURE — 3074F SYST BP LT 130 MM HG: CPT | Performed by: NURSE PRACTITIONER

## 2022-11-30 PROCEDURE — G8427 DOCREV CUR MEDS BY ELIG CLIN: HCPCS | Performed by: NURSE PRACTITIONER

## 2022-11-30 PROCEDURE — G8484 FLU IMMUNIZE NO ADMIN: HCPCS | Performed by: NURSE PRACTITIONER

## 2022-11-30 PROCEDURE — G8417 CALC BMI ABV UP PARAM F/U: HCPCS | Performed by: NURSE PRACTITIONER

## 2022-11-30 PROCEDURE — 3078F DIAST BP <80 MM HG: CPT | Performed by: NURSE PRACTITIONER

## 2022-11-30 PROCEDURE — 1036F TOBACCO NON-USER: CPT | Performed by: NURSE PRACTITIONER

## 2022-11-30 PROCEDURE — 99214 OFFICE O/P EST MOD 30 MIN: CPT | Performed by: NURSE PRACTITIONER

## 2022-11-30 RX ORDER — LISINOPRIL 10 MG/1
10 TABLET ORAL DAILY
Qty: 90 TABLET | Refills: 1 | Status: SHIPPED | OUTPATIENT
Start: 2022-11-30

## 2022-11-30 RX ORDER — PREDNISONE 20 MG/1
TABLET ORAL
Qty: 18 TABLET | Refills: 0 | Status: SHIPPED | OUTPATIENT
Start: 2022-11-30

## 2022-11-30 ASSESSMENT — PATIENT HEALTH QUESTIONNAIRE - PHQ9
SUM OF ALL RESPONSES TO PHQ QUESTIONS 1-9: 0
SUM OF ALL RESPONSES TO PHQ QUESTIONS 1-9: 0
SUM OF ALL RESPONSES TO PHQ9 QUESTIONS 1 & 2: 0
1. LITTLE INTEREST OR PLEASURE IN DOING THINGS: 0
SUM OF ALL RESPONSES TO PHQ QUESTIONS 1-9: 0
DEPRESSION UNABLE TO ASSESS: FUNCTIONAL CAPACITY MOTIVATION LIMITS ACCURACY
2. FEELING DOWN, DEPRESSED OR HOPELESS: 0
SUM OF ALL RESPONSES TO PHQ QUESTIONS 1-9: 0

## 2022-11-30 ASSESSMENT — ENCOUNTER SYMPTOMS
CHEST TIGHTNESS: 1
CHOKING: 0
WHEEZING: 0
APNEA: 0
SHORTNESS OF BREATH: 0

## 2022-11-30 NOTE — TELEPHONE ENCOUNTER
LMOM informing patient  about  lipid panel being ordered. Patient needs to be fasting, can have it done any time.      Arcadio ordered dt it being awhile and patient's BP being elevated

## 2022-11-30 NOTE — PROGRESS NOTES
[facility]  [address] [place] [place] [address]  Dept: [phone]  Dept Fax: [phone]  Loc: [phone]    Leo Major is a 38 y.o. Rayvikas Douglassck presents today for her medical conditions/complaints as noted below. [place] Liz c/o of Follow-up (Chest and shoulder pain and high BP )      HPI:      Pt went to the ER yesterday for chest and left shoulder pain and elevated BP. She does lift heavy boxes at work but does not remember a specific injury,  BP has been running off and on has been in the high 190/90 range. When her BP has been high she has had some dizziness. NO CAD in family. Pt denies N/V or SOB with the CP, did have a DVT in past but denies any left arm swelling or redness. HTN    Does patient check BP regularly at home? - Yes  Current Medication regimen - lisinopril 5 mg daily pt is in a lesbian relationship so no chance of pregnancy. Tolerating medications well? - yes    Shortness of breath or chest pain? Yes - CP off and on, has had it for 2 weeks it was worse when it started and has lessened a bit, not taking any pain med or NSAIDS for it. Headache or visual complaints? No  Neurologic changes like confusion? No  Extremity edema? No    BP Readings from Last 3 Encounters:  11/30/22 : (!) 148/96  11/29/22 : (!) 172/97  08/29/22 : 132/84       Chest Pain    Location -  left chest and left shoulder  Symptoms have been present for 2 week(s). Onset of symptoms was sudden. Inciting Event? No specific event, it does hurt to lay on her left side        , denies any recent coughing, had a normal EKG and chest x-ray in ER. Description of chest pain -heaviness, pressure . The pain radiates to the left arm. Intensity - mild. Aggravating factors - nothing happens at rest and with activity . Alleviating factors -  nothing      N/V? No  SOB? No  Lightheadedness? No  Palpitations? No  Diaphoresis? No  Cough? No    Cardiac risk factors  hypertension,  metabolic syndrome,  obesity and sedentary lifestyle)? Lab Results       Component                Value               Date/Time                  NA                       138                 11/29/2022 02:34 PM        K                        4.6                 11/29/2022 02:34 PM        K                        3.7                 10/15/2019 08:07 PM        CL                       101                 11/29/2022 02:34 PM        CO2                      24                  11/29/2022 02:34 PM        BUN                      12                  11/29/2022 02:34 PM        CREATININE               0.7                 11/29/2022 02:34 PM        GLUCOSE                  103                 11/29/2022 02:34 PM        GLUCOSE                  116                 08/19/2022 11:40 AM        CALCIUM                  9.4                 11/29/2022 02:34 PM                 Current Outpatient Medications   Medication Sig Dispense Refill    lisinopril (PRINIVIL;ZESTRIL) 10 MG tablet Take 1 tablet by mouth daily 90 tablet 1    predniSONE (DELTASONE) 20 MG tablet 1 tab po tid for 3 days 1 tab po bid for 3 days 1 tab po once day for 3 days 18 tablet 0    amitriptyline (ELAVIL) 25 MG tablet Take 1 tablet by mouth nightly 90 tablet 1    DULoxetine (CYMBALTA) 30 MG extended release capsule Take 1 capsule by mouth daily 30 capsule 3    albuterol sulfate HFA (PROVENTIL HFA) 108 (90 Base) MCG/ACT inhaler Inhale 2 puffs into the lungs every 6 hours as needed for Wheezing 18 g 3    fluticasone (FLONASE) 50 MCG/ACT nasal spray 1 spray by Each Nostril route daily 32 g 1    SUMAtriptan (IMITREX) 25 MG tablet Take 1 tablet by mouth at onset of headache, may repeat in 1 hour if no better. Not to exceed two doses in 24 hours.  9 tablet 3    Azelaic Acid 15 % GEL Apply 1 applicator topically See Admin Instructions 1 Tube 5    B Complex Vitamins (VITAMIN-B COMPLEX PO) Take by mouth daily predniSONE (DELTASONE) 20 MG tablet 1 tab po tid for 3 days 1 tab po bid for 3 days 1 tab po once day for 3 days 18 tablet 0    loratadine (CLARITIN) 10 MG tablet Take 1 tablet by mouth daily 30 tablet 3     No current facility-administered medications for this visit.           Past Medical History:   Diagnosis Date    Blood clot in vein     Blood in urine     Change in bowel habits     Chills     DVT (deep vein thrombosis) in pregnancy     Endometriosis     Fever     Hoarseness     Irregular periods     Liver disease     Migraines     Pulmonary emboli (HCC)     SOB (shortness of breath)       Past Surgical History:   Procedure Laterality Date    ANKLE SURGERY Right     achillies     SECTION      DILATION AND CURETTAGE OF UTERUS N/A 2019    DILATATION AND CURETTAGE HYSTEROSCOPY performed by Deirdre Diggs MD at [address] [place] [address]      removed 2019    OTHER SURGICAL HISTORY      ivc filter    UPPER GASTROINTESTINAL ENDOSCOPY  2019    Magaly Melendez     Family History   Problem Relation Age of Onset    Early Death Neg Hx     Colon Cancer Neg Hx     Colon Polyps Neg Hx     Esophageal Cancer Neg Hx      Social History     Tobacco Use    Smoking status: Never    Smokeless tobacco: Never   Substance Use Topics    Alcohol use: Yes     Comment: occasional        No Known Allergies    Health Maintenance   Topic Date Due    Diabetes screen  Never done    Cervical cancer screen  2019    COVID-19 Vaccine (3 - Booster for Pfizer series) 12/15/2021    Flu vaccine (1) Never done    Lipids  Never done    Depression Screen  2023    DTaP/Tdap/Td vaccine (2 - Td or Tdap) 2032    Hepatitis C screen  Completed    HIV screen  Completed    Hepatitis A vaccine  Aged Out    Hib vaccine  Aged Out    Meningococcal (ACWY) vaccine  Aged Out    Pneumococcal 0-64 years Vaccine  Aged Out    Varicella vaccine  Discontinued       Subjective:      Review of Systems Constitutional:  Negative for chills, fatigue, fever and unexpected weight change. HENT: Negative. Respiratory:  Positive for chest tightness. Negative for apnea, choking, shortness of breath and wheezing. Cardiovascular:  Positive for chest pain. Negative for palpitations and leg swelling. Genitourinary: Negative. Musculoskeletal:  Negative for arthralgias and myalgias. Skin: Negative. Neurological:  Negative for dizziness, syncope, facial asymmetry, speech difficulty, weakness, light-headedness, numbness and headaches. Psychiatric/Behavioral:  Positive for sleep disturbance (due to left arm pain). Negative for suicidal ideas. Objective:      BP (!) 148/96   Pulse 86   Temp 98.1 °F (36.7 °C)   Resp 16   Ht 5' 3\" (1.6 m)   Wt 252 lb 9.6 oz (114.6 kg)   SpO2 98%   BMI 44.75 kg/m²      Physical Exam  Vitals and nursing note reviewed. Constitutional:       Appearance: She is not ill-appearing. Cardiovascular:      Rate and Rhythm: Normal rate and regular rhythm. Pulses:           Radial pulses are 1+ on the right side and 1+ on the left side. Heart sounds: Normal heart sounds. No murmur heard. Comments: Left radial pulse equal and strong     No left upper extremity swelling or erythema    Chest pain not reproducible with palpation    Pulmonary:      Effort: Pulmonary effort is normal. No respiratory distress. Breath sounds: Normal breath sounds. No wheezing. Abdominal:      General: Abdomen is flat. Bowel sounds are normal. There is no distension. Palpations: Abdomen is soft. Musculoskeletal:      Right lower leg: No edema. Left lower leg: No edema. Comments: 5/5 strength in the UEs  ROM is normal bilaterally.   Palpitation of the clavicle, AC joint and shoulder joint revealed no tenderness  The patient has a negative standard lift off test.  Neer's test is negative  Empty Can test is negative  Alberts Test is negative    Skin:     General: Skin is warm and dry. Capillary Refill: Capillary refill takes less than 2 seconds. Neurological:      Mental Status: She is alert. Psychiatric:         Mood and Affect: Mood normal.         Behavior: Behavior normal.         Thought Content: Thought content normal.         Judgment: Judgment normal.        Assessment/Plan:           1. Primary hypertension  Uncontrolled  Increase lisinopril to 10 mg daily   Check lipid panel   lisinopril (PRINIVIL;ZESTRIL) 10 MG tablet; Take 1 tablet by mouth daily  Dispense: 90 tablet; Refill: 1  - Lipid Panel; Future    2. Other chest pain  EKG reassuring,   Does not sound like GI R/T pain   Consider costochondritis vs muscle strain vs DVT LUE vs anxiety  If testing is normal consider stress test to R/O CAD, pt declines at this point.   - Lipid Panel; Future    3. BMI 40.0-44.9, adult (HCC)  Increase physical activity   Decrease caloric intake loose weight   - Lipid Panel; Future    4. History of DVT (deep vein thrombosis)  R/o DVT  Exam not consistent with shoulder strain or rotator cuff tear   - US DUP UPPER EXTREMITY LEFT VENOUS; Future    -5. Left arm pain  #4  - US DUP UPPER EXTREMITY LEFT VENOUS; Future    6 Myalgia  Prednisone  Warm moist heat       Return in about 2 weeks (around 12/14/2022) for NV for BP check . Reccommended tobaccocessation options including pharmacologic methods, counseled great than 3 minutesduring this visit:  Yes[]  No  []       Patient given educational materials -see patient instructions. Discussed use, benefit, and side effects of prescribedmedications. All patient questions answered. Pt voiced understanding. Reviewedhealth maintenance. Instructed to continue current medications, diet and exercise. Patient agreed with treatment plan. Follow up as directed.        Electronicallysigned by REECE Melendez CNP on 11/30/2022 at 3:43 PM

## 2022-12-01 ENCOUNTER — TELEPHONE (OUTPATIENT)
Dept: FAMILY MEDICINE CLINIC | Age: 42
End: 2022-12-01

## 2022-12-01 NOTE — TELEPHONE ENCOUNTER
Patient has a VL DUP    12/2/22 at 8:30 AM     Pt was supposed to be a STAT hold and call yesterday 11/30/22    If patient calls back, transfer pt to CS and see if we can get her in today

## 2022-12-05 ENCOUNTER — HOSPITAL ENCOUNTER (OUTPATIENT)
Dept: INTERVENTIONAL RADIOLOGY/VASCULAR | Age: 42
Discharge: HOME | End: 2022-12-05
Payer: COMMERCIAL

## 2022-12-05 DIAGNOSIS — Z86.718 HISTORY OF DVT (DEEP VEIN THROMBOSIS): ICD-10-CM

## 2022-12-05 DIAGNOSIS — M79.602 LEFT ARM PAIN: ICD-10-CM

## 2022-12-05 PROCEDURE — 93971 EXTREMITY STUDY: CPT

## 2022-12-06 ENCOUNTER — TELEPHONE (OUTPATIENT)
Dept: FAMILY MEDICINE CLINIC | Age: 42
End: 2022-12-06

## 2022-12-06 NOTE — TELEPHONE ENCOUNTER
----- Message from REECE Melendez CNP sent at 12/6/2022  7:51 AM EST -----  Let pt  know her doppler study was negative for a DVT

## 2022-12-10 DIAGNOSIS — M79.7 FIBROMYALGIA: ICD-10-CM

## 2022-12-10 DIAGNOSIS — G47.00 INSOMNIA, UNSPECIFIED TYPE: ICD-10-CM

## 2022-12-12 RX ORDER — ALBUTEROL SULFATE 90 UG/1
2 AEROSOL, METERED RESPIRATORY (INHALATION) EVERY 6 HOURS PRN
Qty: 18 G | Refills: 3 | Status: SHIPPED | OUTPATIENT
Start: 2022-12-12

## 2022-12-12 RX ORDER — AMITRIPTYLINE HYDROCHLORIDE 25 MG/1
25 TABLET, FILM COATED ORAL NIGHTLY
Qty: 90 TABLET | Refills: 1 | Status: SHIPPED | OUTPATIENT
Start: 2022-12-12 | End: 2023-01-16 | Stop reason: SDUPTHER

## 2022-12-12 RX ORDER — DULOXETIN HYDROCHLORIDE 30 MG/1
30 CAPSULE, DELAYED RELEASE ORAL DAILY
Qty: 90 CAPSULE | Refills: 3 | Status: SHIPPED | OUTPATIENT
Start: 2022-12-12 | End: 2023-01-16 | Stop reason: SDUPTHER

## 2022-12-12 NOTE — TELEPHONE ENCOUNTER
Recent Visits  Date Type Provider Dept   11/30/22 Office Visit Erica REECE Atkinson CNP [facility]   08/29/22 Office Visit GustavoREECE Cherry CNP [facility]   06/29/22 Office Visit Erica AguirreREECE daily - CNP [facility]   02/17/22 Office Visit Erica REECE Atkinson - CNP [facility]   Showing recent visits within past 540 days with a meds authorizing provider and meeting all other requirements  Future Appointments  No visits were found meeting these conditions.   Showing future appointments within next 150 days with a meds authorizing provider and meeting all other requirements      Future Appointments   Date Time Provider Ermelinda Brown   12/15/2022  9:00 AM SCHEDULE, [facility]

## 2022-12-15 ENCOUNTER — NURSE ONLY (OUTPATIENT)
Dept: FAMILY MEDICINE CLINIC | Age: 42
End: 2022-12-15

## 2022-12-15 ENCOUNTER — TELEPHONE (OUTPATIENT)
Dept: FAMILY MEDICINE CLINIC | Age: 42
End: 2022-12-15

## 2022-12-15 VITALS
RESPIRATION RATE: 10 BRPM | SYSTOLIC BLOOD PRESSURE: 116 MMHG | HEART RATE: 82 BPM | DIASTOLIC BLOOD PRESSURE: 86 MMHG | OXYGEN SATURATION: 96 %

## 2022-12-15 DIAGNOSIS — I10 ESSENTIAL HYPERTENSION: Primary | ICD-10-CM

## 2022-12-15 NOTE — PROGRESS NOTES
Patient presents to the office for BP re-check  PULSE-82   O2- 96  BP Readings from Last 3 Encounters:   12/15/22 116/86   11/30/22 (!) 148/96   11/29/22 (!) 172/97

## 2022-12-15 NOTE — TELEPHONE ENCOUNTER
Patient presents to the office for BP re-check  Patient reports that she takes BP medication in the AM when she wakes up  PULSE-82   O2- 96  BP Readings from Last 3 Encounters:   12/15/22 116/86   11/30/22 (!) 148/96   11/29/22 (!) 172/97

## 2023-01-04 ENCOUNTER — HOSPITAL ENCOUNTER (EMERGENCY)
Age: 43
Discharge: HOME | End: 2023-01-04
Attending: EMERGENCY MEDICINE
Payer: COMMERCIAL

## 2023-01-04 ENCOUNTER — APPOINTMENT (OUTPATIENT)
Dept: CT IMAGING | Age: 43
End: 2023-01-04
Payer: COMMERCIAL

## 2023-01-04 VITALS
HEIGHT: 63 IN | OXYGEN SATURATION: 94 % | RESPIRATION RATE: 21 BRPM | TEMPERATURE: 97.7 F | SYSTOLIC BLOOD PRESSURE: 141 MMHG | DIASTOLIC BLOOD PRESSURE: 74 MMHG | HEART RATE: 105 BPM | BODY MASS INDEX: 42.52 KG/M2 | WEIGHT: 240 LBS

## 2023-01-04 DIAGNOSIS — K63.89 EPIPLOIC APPENDAGITIS: Primary | ICD-10-CM

## 2023-01-04 LAB
ALBUMIN SERPL-MCNC: 4.1 G/DL (ref 3.5–5.1)
ALP BLD-CCNC: 108 U/L (ref 38–126)
ALT SERPL-CCNC: 37 U/L (ref 11–66)
ANION GAP SERPL CALCULATED.3IONS-SCNC: 13 MEQ/L (ref 8–16)
AST SERPL-CCNC: 27 U/L (ref 5–40)
BACTERIA: ABNORMAL /HPF
BASOPHILS # BLD: 0.3 %
BASOPHILS ABSOLUTE: 0 THOU/MM3 (ref 0–0.1)
BILIRUB SERPL-MCNC: 0.2 MG/DL (ref 0.3–1.2)
BILIRUBIN URINE: NEGATIVE
BLOOD, URINE: NEGATIVE
BUN BLDV-MCNC: 12 MG/DL (ref 7–22)
CALCIUM SERPL-MCNC: 9.3 MG/DL (ref 8.5–10.5)
CASTS 2: ABNORMAL /LPF
CASTS UA: ABNORMAL /LPF
CHARACTER, URINE: CLEAR
CHLORIDE BLD-SCNC: 99 MEQ/L (ref 98–111)
CO2: 25 MEQ/L (ref 23–33)
COLOR: YELLOW
CREAT SERPL-MCNC: 0.8 MG/DL (ref 0.4–1.2)
CRYSTALS, UA: ABNORMAL
EOSINOPHIL # BLD: 1.3 %
EOSINOPHILS ABSOLUTE: 0.2 THOU/MM3 (ref 0–0.4)
EPITHELIAL CELLS, UA: ABNORMAL /HPF
ERYTHROCYTE [DISTWIDTH] IN BLOOD BY AUTOMATED COUNT: 13.8 % (ref 11.5–14.5)
ERYTHROCYTE [DISTWIDTH] IN BLOOD BY AUTOMATED COUNT: 45 FL (ref 35–45)
GFR SERPL CREATININE-BSD FRML MDRD: > 60 ML/MIN/1.73M2
GLUCOSE BLD-MCNC: 103 MG/DL (ref 70–108)
GLUCOSE URINE: NEGATIVE MG/DL
HCT VFR BLD CALC: 43.6 % (ref 37–47)
HEMOGLOBIN: 14.4 GM/DL (ref 12–16)
IMMATURE GRANS (ABS): 0.05 THOU/MM3 (ref 0–0.07)
IMMATURE GRANULOCYTES: 0.4 %
KETONES, URINE: ABNORMAL
LEUKOCYTE ESTERASE, URINE: ABNORMAL
LIPASE: 22.7 U/L (ref 5.6–51.3)
LYMPHOCYTES # BLD: 32.7 %
LYMPHOCYTES ABSOLUTE: 3.9 THOU/MM3 (ref 1–4.8)
MCH RBC QN AUTO: 29.5 PG (ref 26–33)
MCHC RBC AUTO-ENTMCNC: 33 GM/DL (ref 32.2–35.5)
MCV RBC AUTO: 89.3 FL (ref 81–99)
MISCELLANEOUS 2: ABNORMAL
MONOCYTES # BLD: 5.6 %
MONOCYTES ABSOLUTE: 0.7 THOU/MM3 (ref 0.4–1.3)
NITRITE, URINE: NEGATIVE
NUCLEATED RED BLOOD CELLS: 0 /100 WBC
OSMOLALITY CALCULATION: 273.8 MOSMOL/KG (ref 275–300)
PH UA: 7.5 (ref 5–9)
PLATELET # BLD: 375 THOU/MM3 (ref 130–400)
PMV BLD AUTO: 8.8 FL (ref 9.4–12.4)
POTASSIUM REFLEX MAGNESIUM: 4.2 MEQ/L (ref 3.5–5.2)
PROTEIN UA: NEGATIVE
RBC # BLD: 4.88 MILL/MM3 (ref 4.2–5.4)
RBC URINE: ABNORMAL /HPF
RENAL EPITHELIAL, UA: ABNORMAL
SEG NEUTROPHILS: 59.7 %
SEGMENTED NEUTROPHILS ABSOLUTE COUNT: 7.2 THOU/MM3 (ref 1.8–7.7)
SODIUM BLD-SCNC: 137 MEQ/L (ref 135–145)
SPECIFIC GRAVITY, URINE: 1.02 (ref 1–1.03)
TOTAL PROTEIN: 7.7 G/DL (ref 6.1–8)
UROBILINOGEN, URINE: 1 EU/DL (ref 0–1)
WBC # BLD: 12 THOU/MM3 (ref 4.8–10.8)
WBC UA: ABNORMAL /HPF
YEAST: ABNORMAL

## 2023-01-04 PROCEDURE — 80053 COMPREHEN METABOLIC PANEL: CPT

## 2023-01-04 PROCEDURE — 6360000004 HC RX CONTRAST MEDICATION: Performed by: EMERGENCY MEDICINE

## 2023-01-04 PROCEDURE — 93005 ELECTROCARDIOGRAM TRACING: CPT | Performed by: EMERGENCY MEDICINE

## 2023-01-04 PROCEDURE — 36415 COLL VENOUS BLD VENIPUNCTURE: CPT

## 2023-01-04 PROCEDURE — 96375 TX/PRO/DX INJ NEW DRUG ADDON: CPT

## 2023-01-04 PROCEDURE — 6360000002 HC RX W HCPCS: Performed by: EMERGENCY MEDICINE

## 2023-01-04 PROCEDURE — 96374 THER/PROPH/DIAG INJ IV PUSH: CPT

## 2023-01-04 PROCEDURE — 99285 EMERGENCY DEPT VISIT HI MDM: CPT

## 2023-01-04 PROCEDURE — 74177 CT ABD & PELVIS W/CONTRAST: CPT

## 2023-01-04 PROCEDURE — 81001 URINALYSIS AUTO W/SCOPE: CPT

## 2023-01-04 PROCEDURE — 83690 ASSAY OF LIPASE: CPT

## 2023-01-04 PROCEDURE — 85025 COMPLETE CBC W/AUTO DIFF WBC: CPT

## 2023-01-04 RX ORDER — ONDANSETRON 2 MG/ML
4 INJECTION INTRAMUSCULAR; INTRAVENOUS ONCE
Status: COMPLETED | OUTPATIENT
Start: 2023-01-04 | End: 2023-01-04

## 2023-01-04 RX ORDER — MORPHINE SULFATE 4 MG/ML
4 INJECTION, SOLUTION INTRAMUSCULAR; INTRAVENOUS ONCE
Status: COMPLETED | OUTPATIENT
Start: 2023-01-04 | End: 2023-01-04

## 2023-01-04 RX ORDER — ONDANSETRON 4 MG/1
4 TABLET, FILM COATED ORAL EVERY 8 HOURS PRN
Qty: 8 TABLET | Refills: 0 | Status: SHIPPED | OUTPATIENT
Start: 2023-01-04 | End: 2023-01-07

## 2023-01-04 RX ORDER — DICYCLOMINE HCL 20 MG
20 TABLET ORAL 3 TIMES DAILY PRN
Qty: 8 TABLET | Refills: 0 | Status: SHIPPED | OUTPATIENT
Start: 2023-01-04 | End: 2023-01-07

## 2023-01-04 RX ADMIN — MORPHINE SULFATE 4 MG: 4 INJECTION, SOLUTION INTRAMUSCULAR; INTRAVENOUS at 21:16

## 2023-01-04 RX ADMIN — IOPAMIDOL 80 ML: 755 INJECTION, SOLUTION INTRAVENOUS at 21:58

## 2023-01-04 RX ADMIN — ONDANSETRON 4 MG: 2 INJECTION INTRAMUSCULAR; INTRAVENOUS at 21:14

## 2023-01-04 ASSESSMENT — PAIN SCALES - GENERAL
PAINLEVEL_OUTOF10: 8
PAINLEVEL_OUTOF10: 2
PAINLEVEL_OUTOF10: 6

## 2023-01-04 ASSESSMENT — PAIN - FUNCTIONAL ASSESSMENT: PAIN_FUNCTIONAL_ASSESSMENT: 0-10

## 2023-01-04 ASSESSMENT — PAIN DESCRIPTION - LOCATION
LOCATION: ABDOMEN

## 2023-01-04 ASSESSMENT — PAIN DESCRIPTION - ONSET: ONSET: ON-GOING

## 2023-01-04 ASSESSMENT — PAIN DESCRIPTION - FREQUENCY: FREQUENCY: CONTINUOUS

## 2023-01-05 LAB
EKG ATRIAL RATE: 101 BPM
EKG P AXIS: 51 DEGREES
EKG P-R INTERVAL: 166 MS
EKG Q-T INTERVAL: 352 MS
EKG QRS DURATION: 82 MS
EKG QTC CALCULATION (BAZETT): 456 MS
EKG R AXIS: 53 DEGREES
EKG T AXIS: 14 DEGREES
EKG VENTRICULAR RATE: 101 BPM

## 2023-01-05 NOTE — ED NOTES
Pt resting in bed with eyes closed, no concerns noted. Call light in reach. Family at bedside.       Wagner Kuhn RN  01/04/23 2557

## 2023-01-05 NOTE — DISCHARGE INSTRUCTIONS
Follow-up with your doctor in 2 to 3 days. Return to ER for any change in severity, nature, location of pain, persistent vomiting, fever, or any new symptoms or concerns.

## 2023-01-05 NOTE — ED TRIAGE NOTES
Pt presents to ED for evaluation of abdominal pain. Pt states onset approx. 4 days ago, but says it got a lot worse at 1900 today. Pt states pain jumped from 4 to 8 at that time, and that pain in continuous. Denies CP or SOB at this time. Vitals, labs, EKG obtained.

## 2023-01-05 NOTE — ED PROVIDER NOTES
[address] [facility] [address] EMERGENCY DEPT  [address] [facility] [address]  Phone: [phone]      CHIEF COMPLAINT       Chief Complaint   Patient presents with    Abdominal Pain       Nurses Notes reviewed and I agree except as noted in the HPI. HISTORY OF PRESENT ILLNESS    Sudhakar Omalley is a 38 y.o. female. Patient complained of pain that has been going on over 3 to 4 days and seem to be in the right lower quadrant. She was advised to come in and be evaluated. There is no prior abdominal surgeries other than prior . No fever reported. REVIEW OF SYSTEMS         No chest pain or dyspnea    Remainder of review of systems is otherwise reviewed as negative. PAST MEDICAL HISTORY    has a past medical history of Blood clot in vein, Blood in urine, Change in bowel habits, Chills, DVT (deep vein thrombosis) in pregnancy, Endometriosis, Fever, Hoarseness, Irregular periods, Liver disease, Migraines, Pulmonary emboli (HCC), and SOB (shortness of breath). SURGICAL HISTORY      has a past surgical history that includes  section; other surgical history; Ankle surgery (Right); IVC filter insertion; Dilation and curettage of uterus (N/A, 2019); and Upper gastrointestinal endoscopy (2019).     CURRENT MEDICATIONS       Discharge Medication List as of 2023 11:42 PM        CONTINUE these medications which have NOT CHANGED    Details   albuterol sulfate HFA (PROVENTIL HFA) 108 (90 Base) MCG/ACT inhaler Inhale 2 puffs into the lungs every 6 hours as needed for Wheezing, Disp-18 g, R-3Normal      amitriptyline (ELAVIL) 25 MG tablet Take 1 tablet by mouth nightly, Disp-90 tablet, R-1Normal      DULoxetine (CYMBALTA) 30 MG extended release capsule Take 1 capsule by mouth daily, Disp-90 capsule, R-3Normal      lisinopril (PRINIVIL;ZESTRIL) 10 MG tablet Take 1 tablet by mouth daily, Disp-90 tablet, R-1Normal      !! predniSONE (DELTASONE) 20 MG tablet 1 tab po tid for 3 days 1 tab po bid for 3 days 1 tab po once day for 3 days, Disp-18 tablet, R-0Normal      !! predniSONE (DELTASONE) 20 MG tablet 1 tab po tid for 3 days 1 tab po bid for 3 days 1 tab po once day for 3 days, Disp-18 tablet, R-0Normal      fluticasone (FLONASE) 50 MCG/ACT nasal spray 1 spray by Each Nostril route daily, Disp-32 g, R-1Normal      SUMAtriptan (IMITREX) 25 MG tablet Take 1 tablet by mouth at onset of headache, may repeat in 1 hour if no better. Not to exceed two doses in 24 hours. , Disp-9 tablet, R-3Normal      loratadine (CLARITIN) 10 MG tablet Take 1 tablet by mouth daily, Disp-30 tablet,R-3Normal      Azelaic Acid 15 % GEL Apply 1 applicator topically See Admin Instructions, Disp-1 Tube, R-5Normal      B Complex Vitamins (VITAMIN-B COMPLEX PO) Take by mouth dailyHistorical Med       !! - Potential duplicate medications found. Please discuss with provider. ALLERGIES     has No Known Allergies. FAMILY HISTORY     She indicated that her mother is alive. She indicated that her father is alive. She indicated that the status of her neg hx is unknown.   family history is not on file. SOCIAL HISTORY      reports that she has never smoked. She has never used smokeless tobacco. She reports current alcohol use. She reports that she does not use drugs. PHYSICAL EXAM     INITIAL VITALS:  height is 5' 3\" (1.6 m) and weight is 240 lb (108.9 kg). Her oral temperature is 97.7 °F (36.5 °C). Her blood pressure is 141/74 (abnormal) and her pulse is 105 (abnormal). Her respiration is 21 and oxygen saturation is 94%. Constitutional: Well appearing and non-toxic   Eyes:  Pupils are equal and reactive, extraocular muscles intact   HENT:  Atraumatic appearing  oropharynx moist, no pharyngeal exudates.   Neck- normal range of motion,supple   Respiratory:  No wheezing, rhonchi or rales  Cardiovascular: regular  GI: Right lower quadrant tenderness, no rigidity, rebound or guarding  :  No costovertebral angle tenderness Back- no tenderness      DIAGNOSTIC RESULTS         RADIOLOGY: non-plain film images(s) such as CT, Ultrasound and MRI are read by the radiologist.  CT of the abdomen pelvis was interpreted by the radiologist showing epiploic appendagitis. Appendix appears normal.    LABS:   Labs Reviewed   CBC WITH AUTO DIFFERENTIAL - Abnormal; Notable for the following components:       Result Value    WBC 12.0 (*)     MPV 8.8 (*)     All other components within normal limits   COMPREHENSIVE METABOLIC PANEL W/ REFLEX TO MG FOR LOW K - Abnormal; Notable for the following components: Total Bilirubin 0.2 (*)     All other components within normal limits   URINE WITH REFLEXED MICRO - Abnormal; Notable for the following components:    Ketones, Urine TRACE (*)     Leukocyte Esterase, Urine TRACE (*)     All other components within normal limits   OSMOLALITY - Abnormal; Notable for the following components:    Osmolality Calc 273.8 (*)     All other components within normal limits   LIPASE   ANION GAP   GLOMERULAR FILTRATION RATE, ESTIMATED       EMERGENCY DEPARTMENT COURSE:   Vitals:    Vitals:    01/04/23 2225 01/04/23 2240 01/04/23 2255 01/04/23 2310   BP: 124/68 (!) 140/73 129/70 (!) 141/74   Pulse: 97 (!) 105 95 (!) 105   Resp:  13 13 21   Temp:       TempSrc:       SpO2: 95% 96% 95% 94%   Weight:       Height:         Patient presents with right lower quadrant abdominal pain and differential would certainly include appendicitis. Also want to look for evidence of gallbladder disease, ovarian cyst.  Now appears that she has evidence of epiploic appendagitis on the CT imaging. Appendix is normal.  No evidence of ovarian cyst.  Patient did receive some pain medicine nausea medicine while here. Abdominal exam remains unchanged. Epiploic appendagitis should be self-limited.   We are advising her to follow-up in 2 to 3 days or return to the emergency department for any sudden worsening of pain persistent vomiting or any new symptoms or concerns. Old records reviewed. CRITICAL CARE:   none      FINAL IMPRESSION      1.  Epiploic appendagitis          DISPOSITION/PLAN   Discharged    DISCHARGE MEDICATIONS:  Discharge Medication List as of 1/4/2023 11:42 PM        START taking these medications    Details   dicyclomine (BENTYL) 20 MG tablet Take 1 tablet by mouth 3 times daily as needed (abd pain), Disp-8 tablet, R-0Print      ondansetron (ZOFRAN) 4 MG tablet Take 1 tablet by mouth every 8 hours as needed for Nausea or Vomiting, Disp-8 tablet, R-0Print             (Please note that portions of this note were completed with a voice recognition program.  Efforts were made to edit the dictations but occasionally words are mis-transcribed.)    Chin Landin, [address], DO  01/05/23 0139

## 2023-01-16 DIAGNOSIS — G47.00 INSOMNIA, UNSPECIFIED TYPE: ICD-10-CM

## 2023-01-16 DIAGNOSIS — M79.7 FIBROMYALGIA: ICD-10-CM

## 2023-01-16 RX ORDER — DULOXETIN HYDROCHLORIDE 30 MG/1
30 CAPSULE, DELAYED RELEASE ORAL DAILY
Qty: 90 CAPSULE | Refills: 3 | Status: SHIPPED | OUTPATIENT
Start: 2023-01-16

## 2023-01-16 RX ORDER — AMITRIPTYLINE HYDROCHLORIDE 25 MG/1
25 TABLET, FILM COATED ORAL NIGHTLY
Qty: 90 TABLET | Refills: 1 | Status: SHIPPED | OUTPATIENT
Start: 2023-01-16

## 2023-01-16 NOTE — TELEPHONE ENCOUNTER
Recent Visits  Date Type Provider Dept   11/30/22 Office Visit Jordon Ramírez, REECE - CNP [facility]   08/29/22 Office Visit Jordon Ramírez, APRN - CNP [facility]   06/29/22 Office Visit Jordon Ramírez, APRN - CNP [facility]   02/17/22 Office Visit Jordon Ramírez, APRN - CNP [facility]   Showing recent visits within past 540 days with a meds authorizing provider and meeting all other requirements  Future Appointments  No visits were found meeting these conditions. Showing future appointments within next 150 days with a meds authorizing provider and meeting all other requirements      No future appointments.

## 2023-03-23 NOTE — TELEPHONE ENCOUNTER
Please contact pharmacy to make sure the prescription I just sent was received for the azelaic acid thanks. No indicators present

## 2023-04-10 ENCOUNTER — HOSPITAL ENCOUNTER (EMERGENCY)
Age: 43
Discharge: HOME | End: 2023-04-10
Payer: COMMERCIAL

## 2023-04-10 VITALS
RESPIRATION RATE: 18 BRPM | TEMPERATURE: 98.6 F | SYSTOLIC BLOOD PRESSURE: 144 MMHG | DIASTOLIC BLOOD PRESSURE: 85 MMHG | HEART RATE: 97 BPM | OXYGEN SATURATION: 96 %

## 2023-04-10 DIAGNOSIS — J06.9 UPPER RESPIRATORY TRACT INFECTION, UNSPECIFIED TYPE: ICD-10-CM

## 2023-04-10 DIAGNOSIS — H66.91 RIGHT OTITIS MEDIA, UNSPECIFIED OTITIS MEDIA TYPE: Primary | ICD-10-CM

## 2023-04-10 LAB — S PYO AG THROAT QL: NEGATIVE

## 2023-04-10 PROCEDURE — 87651 STREP A DNA AMP PROBE: CPT

## 2023-04-10 PROCEDURE — 99213 OFFICE O/P EST LOW 20 MIN: CPT | Performed by: NURSE PRACTITIONER

## 2023-04-10 PROCEDURE — 99213 OFFICE O/P EST LOW 20 MIN: CPT

## 2023-04-10 RX ORDER — AMOXICILLIN AND CLAVULANATE POTASSIUM 875; 125 MG/1; MG/1
1 TABLET, FILM COATED ORAL 2 TIMES DAILY
Qty: 14 TABLET | Refills: 0 | Status: SHIPPED | OUTPATIENT
Start: 2023-04-10 | End: 2023-04-17

## 2023-04-10 RX ORDER — BENZONATATE 200 MG/1
200 CAPSULE ORAL 3 TIMES DAILY PRN
Qty: 21 CAPSULE | Refills: 0 | Status: SHIPPED | OUTPATIENT
Start: 2023-04-10 | End: 2023-04-17

## 2023-04-10 RX ORDER — PREDNISONE 20 MG/1
40 TABLET ORAL DAILY
Qty: 14 TABLET | Refills: 0 | Status: SHIPPED | OUTPATIENT
Start: 2023-04-10 | End: 2023-04-17

## 2023-04-10 RX ORDER — DEXTROMETHORPHAN HYDROBROMIDE AND PROMETHAZINE HYDROCHLORIDE 15; 6.25 MG/5ML; MG/5ML
5 SYRUP ORAL 4 TIMES DAILY PRN
Qty: 118 ML | Refills: 0 | Status: SHIPPED | OUTPATIENT
Start: 2023-04-10 | End: 2023-04-17

## 2023-04-10 ASSESSMENT — ENCOUNTER SYMPTOMS
SINUS PRESSURE: 1
DIARRHEA: 0
VOICE CHANGE: 1
COUGH: 1
NAUSEA: 0
VOMITING: 0
SHORTNESS OF BREATH: 0
SORE THROAT: 1

## 2023-04-10 ASSESSMENT — PAIN DESCRIPTION - LOCATION: LOCATION: CHEST;THROAT

## 2023-04-10 ASSESSMENT — PAIN SCALES - GENERAL: PAINLEVEL_OUTOF10: 5

## 2023-04-10 ASSESSMENT — PAIN DESCRIPTION - PAIN TYPE: TYPE: ACUTE PAIN

## 2023-04-10 ASSESSMENT — PAIN - FUNCTIONAL ASSESSMENT
PAIN_FUNCTIONAL_ASSESSMENT: 0-10
PAIN_FUNCTIONAL_ASSESSMENT: ACTIVITIES ARE NOT PREVENTED

## 2023-04-10 ASSESSMENT — PAIN DESCRIPTION - FREQUENCY: FREQUENCY: INTERMITTENT

## 2023-04-10 ASSESSMENT — PAIN DESCRIPTION - DESCRIPTORS: DESCRIPTORS: SHARP;SORE;TIGHTNESS

## 2023-04-10 NOTE — ED PROVIDER NOTES
that this is likely secondary to her previous upper respiratory infection. She given a prescription for Augmentin for the ear infection and prednisone for upper respiratory infection/bronchitis. She also given Tessalon Perles and Promethazine DM for cough suppression is advised to rest and hydrate and follow-up on an outpatient basis as needed. She is agreeable to plan as discussed. PATIENT REFERRED TO:  REECE Guadarrama CNP  [address] [place] [id number]      DISCHARGE MEDICATIONS:  New Prescriptions    AMOXICILLIN-CLAVULANATE (AUGMENTIN) 875-125 MG PER TABLET    Take 1 tablet by mouth 2 times daily for 7 days    BENZONATATE (TESSALON) 200 MG CAPSULE    Take 1 capsule by mouth 3 times daily as needed for Cough    PREDNISONE (DELTASONE) 20 MG TABLET    Take 2 tablets by mouth daily for 7 days    PROMETHAZINE-DEXTROMETHORPHAN (PROMETHAZINE-DM) 6.25-15 MG/5ML SYRUP    Take 5 mLs by mouth 4 times daily as needed for Cough Do not drive or operate heavy machinery while taking this medication.        Discontinued Medications    LORATADINE (CLARITIN) 10 MG TABLET    Take 1 tablet by mouth daily    PREDNISONE (DELTASONE) 20 MG TABLET    1 tab po tid for 3 days 1 tab po bid for 3 days 1 tab po once day for 3 days    PREDNISONE (DELTASONE) 20 MG TABLET    1 tab po tid for 3 days 1 tab po bid for 3 days 1 tab po once day for 3 days       Current Discharge Medication List        CONTINUE these medications which have CHANGED    Details   predniSONE (DELTASONE) 20 MG tablet Take 2 tablets by mouth daily for 7 days  Qty: 14 tablet, Refills: 0             REECE Hartley CNP    (Please note that portions of this note were completed with a voice recognition program. Efforts were made to edit the dictations but occasionally words are mis-transcribed.)           REECE Hartley CNP  04/10/23 0539

## 2023-04-10 NOTE — ED NOTES
Throat swabbed for strep. Pt tolerated well. Specimen taken to lab for testing.       Liya Daniel RN  04/10/23 1361

## 2023-04-18 ENCOUNTER — OFFICE VISIT (OUTPATIENT)
Dept: FAMILY MEDICINE CLINIC | Age: 43
End: 2023-04-18
Payer: COMMERCIAL

## 2023-04-18 VITALS
DIASTOLIC BLOOD PRESSURE: 82 MMHG | SYSTOLIC BLOOD PRESSURE: 134 MMHG | WEIGHT: 257 LBS | BODY MASS INDEX: 45.54 KG/M2 | HEIGHT: 63 IN | OXYGEN SATURATION: 95 % | TEMPERATURE: 98.2 F | HEART RATE: 86 BPM | RESPIRATION RATE: 16 BRPM

## 2023-04-18 DIAGNOSIS — M26.629 TMJ SYNDROME: ICD-10-CM

## 2023-04-18 DIAGNOSIS — R20.2 FACIAL TINGLING: ICD-10-CM

## 2023-04-18 DIAGNOSIS — R20.8 DECREASED SENSATION OF HAND AND ARM: Primary | ICD-10-CM

## 2023-04-18 DIAGNOSIS — R20.8 DECREASED SENSATION OF LEG: ICD-10-CM

## 2023-04-18 PROCEDURE — 3079F DIAST BP 80-89 MM HG: CPT | Performed by: NURSE PRACTITIONER

## 2023-04-18 PROCEDURE — 99214 OFFICE O/P EST MOD 30 MIN: CPT | Performed by: NURSE PRACTITIONER

## 2023-04-18 PROCEDURE — 3075F SYST BP GE 130 - 139MM HG: CPT | Performed by: NURSE PRACTITIONER

## 2023-04-18 PROCEDURE — G8427 DOCREV CUR MEDS BY ELIG CLIN: HCPCS | Performed by: NURSE PRACTITIONER

## 2023-04-18 PROCEDURE — G8417 CALC BMI ABV UP PARAM F/U: HCPCS | Performed by: NURSE PRACTITIONER

## 2023-04-18 PROCEDURE — 1036F TOBACCO NON-USER: CPT | Performed by: NURSE PRACTITIONER

## 2023-04-18 RX ORDER — NAPROXEN 375 MG/1
375 TABLET ORAL 2 TIMES DAILY WITH MEALS
Qty: 180 TABLET | Refills: 1 | Status: SHIPPED | OUTPATIENT
Start: 2023-04-18

## 2023-04-18 SDOH — ECONOMIC STABILITY: FOOD INSECURITY: WITHIN THE PAST 12 MONTHS, THE FOOD YOU BOUGHT JUST DIDN'T LAST AND YOU DIDN'T HAVE MONEY TO GET MORE.: NEVER TRUE

## 2023-04-18 SDOH — ECONOMIC STABILITY: INCOME INSECURITY: HOW HARD IS IT FOR YOU TO PAY FOR THE VERY BASICS LIKE FOOD, HOUSING, MEDICAL CARE, AND HEATING?: NOT HARD AT ALL

## 2023-04-18 SDOH — ECONOMIC STABILITY: HOUSING INSECURITY
IN THE LAST 12 MONTHS, WAS THERE A TIME WHEN YOU DID NOT HAVE A STEADY PLACE TO SLEEP OR SLEPT IN A SHELTER (INCLUDING NOW)?: NO

## 2023-04-18 SDOH — ECONOMIC STABILITY: FOOD INSECURITY: WITHIN THE PAST 12 MONTHS, YOU WORRIED THAT YOUR FOOD WOULD RUN OUT BEFORE YOU GOT MONEY TO BUY MORE.: NEVER TRUE

## 2023-04-18 ASSESSMENT — PATIENT HEALTH QUESTIONNAIRE - PHQ9
SUM OF ALL RESPONSES TO PHQ9 QUESTIONS 1 & 2: 0
2. FEELING DOWN, DEPRESSED OR HOPELESS: 0
SUM OF ALL RESPONSES TO PHQ QUESTIONS 1-9: 0
1. LITTLE INTEREST OR PLEASURE IN DOING THINGS: 0
DEPRESSION UNABLE TO ASSESS: FUNCTIONAL CAPACITY MOTIVATION LIMITS ACCURACY
SUM OF ALL RESPONSES TO PHQ QUESTIONS 1-9: 0

## 2023-04-19 NOTE — PROGRESS NOTES
strength: normal  Left trapezius strength : normal     CN XII  Tongue: not atropic  Fasiculations: absent  Tongue deviation: none        Psychiatric:         Mood and Affect: Mood normal.         Behavior: Behavior normal.         Thought Content: Thought content normal.         Judgment: Judgment normal.           ASSESSMENT & PLAN  an was seen today for follow-up and health maintenance. Diagnoses and all orders for this visit:    Decreased sensation of hand and arm  -r/o Vitamin deficiency vs ferritin elevation vs other neurological process  -history and exam not consistent with carpal tunnel syndrome or cervical neck impingement   -     Vitamin B12 & Folate; Future  -     Vitamin B1; Future  -     Vitamin B6; Future  -     Ferritin; Future  -     Brant Wright MD, Neurology, [facility] [employer]    Decreased sensation of leg  - as above vs GBS vs MS  -     Vitamin B12 & Folate; Future  -     Vitamin B1; Future  -     Vitamin B6; Future  -     Ferritin; Future  -     Brant Wright MD, Neurology, [facility] [employer] [place]    TMJ syndrome  -start naprosyn  -get bite guar made   -chew on soft foods   -     XR MANDIBLE (MIN 4 VIEWS); Future    Facial tingling  -     Vitamin B12 & Folate; Future  -     Vitamin B1; Future  -     Vitamin B6; Future  -     Ferritin; Future  -     XR MANDIBLE (MIN 4 VIEWS); Future  -     Brant Wright MD, Neurology, [facility] [employer] [facility]    Other orders  -     naproxen (NAPROSYN) 375 MG tablet; Take 1 tablet by mouth 2 times daily (with meals)  Pt in agreement with plan       Return if symptoms worsen or fail to improve. Counseling was provided today regarding the following topics: Healthy eating habits, Regular exercise, substance abuse and healthy sleep habits.

## 2023-05-12 ENCOUNTER — TELEPHONE (OUTPATIENT)
Dept: FAMILY MEDICINE CLINIC | Age: 43
End: 2023-05-12

## 2023-05-12 DIAGNOSIS — I10 ESSENTIAL HYPERTENSION: ICD-10-CM

## 2023-05-12 DIAGNOSIS — R23.2 FACIAL FLUSHING: ICD-10-CM

## 2023-05-12 RX ORDER — AZELAIC ACID 0.15 G/G
1 GEL TOPICAL SEE ADMIN INSTRUCTIONS
Qty: 1 EACH | Refills: 5 | Status: SHIPPED | OUTPATIENT
Start: 2023-05-12 | End: 2023-05-14 | Stop reason: SDUPTHER

## 2023-05-12 RX ORDER — LISINOPRIL 10 MG/1
10 TABLET ORAL DAILY
Qty: 90 TABLET | Refills: 1 | Status: SHIPPED | OUTPATIENT
Start: 2023-05-12

## 2023-05-12 NOTE — TELEPHONE ENCOUNTER
Recent Visits  Date Type Provider Dept   04/18/23 Office Visit REECE Russell CNP [facility]   11/30/22 Office Visit REECE Russell CNP [facility]   08/29/22 Office Visit REECE Russell CNP [facility]   06/29/22 Office Visit REECE Russell CNP [facility]   02/17/22 Office Visit REECE Russell CNP [facility]   Showing recent visits within past 540 days with a meds authorizing provider and meeting all other requirements  Future Appointments  No visits were found meeting these conditions.   Showing future appointments within next 150 days with a meds authorizing provider and meeting all other requirements    Future Appointments   Date Time Provider Ermelinda Brown   6/14/2023  9:45 AM REECE Murphy CNP [facility] -

## 2023-05-14 DIAGNOSIS — R23.2 FACIAL FLUSHING: ICD-10-CM

## 2023-05-14 RX ORDER — AZELAIC ACID 0.15 G/G
1 GEL TOPICAL SEE ADMIN INSTRUCTIONS
Qty: 1 EACH | Refills: 5 | Status: SHIPPED | OUTPATIENT
Start: 2023-05-14

## 2023-05-25 DIAGNOSIS — M79.7 FIBROMYALGIA: ICD-10-CM

## 2023-05-25 DIAGNOSIS — G47.00 INSOMNIA, UNSPECIFIED TYPE: ICD-10-CM

## 2023-05-26 RX ORDER — AMITRIPTYLINE HYDROCHLORIDE 25 MG/1
25 TABLET, FILM COATED ORAL NIGHTLY
Qty: 90 TABLET | Refills: 1 | Status: SHIPPED | OUTPATIENT
Start: 2023-05-26

## 2023-05-26 RX ORDER — DULOXETIN HYDROCHLORIDE 30 MG/1
30 CAPSULE, DELAYED RELEASE ORAL DAILY
Qty: 90 CAPSULE | Refills: 3 | Status: SHIPPED | OUTPATIENT
Start: 2023-05-26

## 2023-05-26 NOTE — TELEPHONE ENCOUNTER
Recent Visits  Date Type Provider Dept   04/18/23 Office Visit REECE Schroeder CNP [facility]   11/30/22 Office Visit REECE Schroeder CNP [facility]   08/29/22 Office Visit REECE Schroeder CNP [facility]   06/29/22 Office Visit REECE Schroeder CNP [facility]   02/17/22 Office Visit REECE Schroeder CNP [facility]   Showing recent visits within past 540 days with a meds authorizing provider and meeting all other requirements  Future Appointments  No visits were found meeting these conditions.   Showing future appointments within next 150 days with a meds authorizing provider and meeting all other requirements      Future Appointments   Date Time Provider Ermelinda Brown   6/14/2023  9:45 AM REECE Dia CNP [facility] -

## 2023-05-26 NOTE — TELEPHONE ENCOUNTER
Recent Visits  Date Type Provider Dept   04/18/23 Office Visit REECE Medina CNP [facility]   11/30/22 Office Visit REECE Medina CNP [facility]   08/29/22 Office Visit REECE Medina CNP [facility]   06/29/22 Office Visit REECE Medina CNP [facility]   02/17/22 Office Visit REECE Medina CNP [facility]   Showing recent visits within past 540 days with a meds authorizing provider and meeting all other requirements  Future Appointments  No visits were found meeting these conditions.   Showing future appointments within next 150 days with a meds authorizing provider and meeting all other requirements      Future Appointments   Date Time Provider Ermelinda Brown   6/14/2023  9:45 AM REECE Grant CNP [facility] -

## 2023-06-11 DIAGNOSIS — R11.0 NAUSEA: Primary | ICD-10-CM

## 2023-06-11 DIAGNOSIS — F41.9 ANXIETY: ICD-10-CM

## 2023-06-11 RX ORDER — LORAZEPAM 0.5 MG/1
0.5 TABLET ORAL
Qty: 1 TABLET | Refills: 0 | Status: SHIPPED | OUTPATIENT
Start: 2023-06-11 | End: 2023-06-11

## 2023-06-11 RX ORDER — SCOLOPAMINE TRANSDERMAL SYSTEM 1 MG/1
1 PATCH, EXTENDED RELEASE TRANSDERMAL
Qty: 3 PATCH | Refills: 0 | Status: SHIPPED | OUTPATIENT
Start: 2023-06-11

## 2023-06-14 ENCOUNTER — OFFICE VISIT (OUTPATIENT)
Dept: NEUROLOGY | Age: 43
End: 2023-06-14
Payer: COMMERCIAL

## 2023-06-14 VITALS
DIASTOLIC BLOOD PRESSURE: 85 MMHG | HEIGHT: 63 IN | OXYGEN SATURATION: 97 % | WEIGHT: 259 LBS | SYSTOLIC BLOOD PRESSURE: 130 MMHG | BODY MASS INDEX: 45.89 KG/M2 | HEART RATE: 87 BPM

## 2023-06-14 DIAGNOSIS — R20.0 FACIAL NUMBNESS: Primary | ICD-10-CM

## 2023-06-14 DIAGNOSIS — R20.0 NUMBNESS: ICD-10-CM

## 2023-06-14 PROCEDURE — 3074F SYST BP LT 130 MM HG: CPT | Performed by: PSYCHIATRY & NEUROLOGY

## 2023-06-14 PROCEDURE — 3078F DIAST BP <80 MM HG: CPT | Performed by: PSYCHIATRY & NEUROLOGY

## 2023-06-14 PROCEDURE — 99204 OFFICE O/P NEW MOD 45 MIN: CPT | Performed by: PSYCHIATRY & NEUROLOGY

## 2023-06-14 PROCEDURE — G8427 DOCREV CUR MEDS BY ELIG CLIN: HCPCS | Performed by: PSYCHIATRY & NEUROLOGY

## 2023-06-14 PROCEDURE — G8417 CALC BMI ABV UP PARAM F/U: HCPCS | Performed by: PSYCHIATRY & NEUROLOGY

## 2023-06-20 NOTE — PROGRESS NOTES
of episode of right facial numbness in July 2022, [place] when [place] entire nose when numb and right V2 location on the right. Symptoms lasted a couple of days and then resolved. She reports another example of losing her color vision in August 2022, she was doing a face painting at the time and was struggling finding colors and then completely lost all color vision, everything was in black and white. She reports a headache at this time. She denies any incontinence of bowel or bladder. No trunk or saddle numbness. I reviewed the patient pertinent labs and records in the EHR and from other providers. The patient was counseled about her symptoms and work up recommended. We will arrange for her to undergo an MRI of the brain with and without contrast to screen for organic causes of her symptoms including but not limited to demyelination. We will also obtain lab work checking connective tissue screening, viral titers, and vitamin levels. After detailed discussion with the patient we agreed on the following plan. Plan    MRI brain W/WO contrast   Will order connective tissue screen including PAMELA, rheumatoid factor, antidouble-stranded DNA, anti-SSA, anti-SSB in addition to serum protein electrophoresis and sed rate. VZV, HSV, EBV titers  Vitamin B12, folate  Vitamin B6 level  Vitamin D level  Vitamin A level  Call with any new symptoms or concerns. Follow up in 6 weeks.      Total time 64 min    Dillon Davison MD

## 2023-06-29 ENCOUNTER — HOSPITAL ENCOUNTER (OUTPATIENT)
Dept: MRI IMAGING | Age: 43
Discharge: HOME | End: 2023-06-29
Payer: COMMERCIAL

## 2023-06-29 DIAGNOSIS — R20.0 NUMBNESS: ICD-10-CM

## 2023-06-29 DIAGNOSIS — R20.0 FACIAL NUMBNESS: ICD-10-CM

## 2023-06-29 LAB — POC CREATININE WHOLE BLOOD: 0.7 MG/DL (ref 0.5–1.2)

## 2023-06-29 PROCEDURE — A9579 GAD-BASE MR CONTRAST NOS,1ML: HCPCS | Performed by: NURSE PRACTITIONER

## 2023-06-29 PROCEDURE — 82565 ASSAY OF CREATININE: CPT

## 2023-06-29 PROCEDURE — 6360000004 HC RX CONTRAST MEDICATION: Performed by: NURSE PRACTITIONER

## 2023-06-29 PROCEDURE — 70553 MRI BRAIN STEM W/O & W/DYE: CPT

## 2023-06-29 RX ADMIN — GADOTERIDOL 20 ML: 279.3 INJECTION, SOLUTION INTRAVENOUS at 08:53

## 2023-09-11 ENCOUNTER — NURSE ONLY (OUTPATIENT)
Dept: LAB | Age: 43
End: 2023-09-11

## 2023-09-11 DIAGNOSIS — R20.0 NUMBNESS: ICD-10-CM

## 2023-09-11 DIAGNOSIS — R20.0 FACIAL NUMBNESS: ICD-10-CM

## 2023-09-11 LAB
25(OH)D3 SERPL-MCNC: 27 NG/ML (ref 30–100)
CRP SERPL-MCNC: 2.14 MG/DL (ref 0–1)
ERYTHROCYTE [SEDIMENTATION RATE] IN BLOOD BY WESTERGREN METHOD: 31 MM/HR (ref 0–20)
FOLATE SERPL-MCNC: 14.1 NG/ML (ref 4.8–24.2)
RHEUMATOID FACT SERPL-ACNC: < 10 IU/ML (ref 0–13)
VIT B12 SERPL-MCNC: 766 PG/ML (ref 211–911)

## 2023-09-12 ENCOUNTER — TELEPHONE (OUTPATIENT)
Dept: FAMILY MEDICINE CLINIC | Age: 43
End: 2023-09-12

## 2023-09-12 ENCOUNTER — TELEPHONE (OUTPATIENT)
Dept: NEUROLOGY | Age: 43
End: 2023-09-12

## 2023-09-12 DIAGNOSIS — E55.9 VITAMIN D DEFICIENCY: Primary | ICD-10-CM

## 2023-09-12 RX ORDER — ERGOCALCIFEROL 1.25 MG/1
50000 CAPSULE ORAL WEEKLY
Qty: 12 CAPSULE | Refills: 1 | Status: SHIPPED | OUTPATIENT
Start: 2023-09-12

## 2023-09-12 NOTE — TELEPHONE ENCOUNTER
Let pt know her recent vitamin d levels were low at 27 normal range is . I recommend she take high dose vitamin d weekly for 12 weeks and then recheck levels. I did place the order for the vitamin d and future lab order, please mail her the vitamin d order. Let me know if any questions.

## 2023-09-12 NOTE — TELEPHONE ENCOUNTER
----- Message from REECE Moran CNP sent at 9/12/2023 10:31 AM EDT -----  Please let patient know her vitamin D level is low=27.  Please ask her to follow up with her PCP regarding this  Danny Stone CNP

## 2023-09-13 LAB
ENA SS-A IGG SER IA-ACNC: NORMAL
ENA SS-B IGG SER IA-ACNC: 1 AU/ML (ref 0–40)
VZV IGG SER QL IA: 4.75

## 2023-09-14 LAB
DSDNA AB TITR SER CLIF: NORMAL {TITER}
EPSTEIN-BARR VIRUS ANTIBODIES: NORMAL
HSV1 GG IGG SER-ACNC: 2.02 IV
HSV1+2 IGG SER IA-ACNC: 21.8 IV
HSV1+2 IGM SER IA-ACNC: 0.72 IV
HSV2 GG IGG SER-ACNC: 0.11 IV
HSV2 GG IGG SER-ACNC: 0.12 IV
NUCLEAR IGG SER QL IA: NORMAL
VZV IGM SER IA-ACNC: 0.03 ISR

## 2023-09-15 LAB
PROTEIN ELECTROPHORESIS, SERUM: NORMAL
PYRIDOXAL PHOS SERPL-SCNC: 20.1 NMOL/L (ref 20–125)
VIT A SERPL-MCNC: NORMAL UG/ML

## 2023-10-03 ENCOUNTER — HOSPITAL ENCOUNTER (EMERGENCY)
Age: 43
Discharge: HOME | End: 2023-10-03
Payer: COMMERCIAL

## 2023-10-03 VITALS
RESPIRATION RATE: 16 BRPM | OXYGEN SATURATION: 96 % | DIASTOLIC BLOOD PRESSURE: 77 MMHG | BODY MASS INDEX: 44.05 KG/M2 | TEMPERATURE: 98.3 F | WEIGHT: 256.6 LBS | HEART RATE: 79 BPM | SYSTOLIC BLOOD PRESSURE: 123 MMHG

## 2023-10-03 DIAGNOSIS — J06.9 VIRAL URI WITH COUGH: Primary | ICD-10-CM

## 2023-10-03 LAB — SARS-COV-2 RDRP RESP QL NAA+PROBE: NOT  DETECTED

## 2023-10-03 PROCEDURE — 99213 OFFICE O/P EST LOW 20 MIN: CPT

## 2023-10-03 PROCEDURE — 99213 OFFICE O/P EST LOW 20 MIN: CPT | Performed by: EMERGENCY MEDICINE

## 2023-10-03 PROCEDURE — 87635 SARS-COV-2 COVID-19 AMP PRB: CPT

## 2023-10-03 RX ORDER — PREDNISONE 20 MG/1
40 TABLET ORAL DAILY
Qty: 10 TABLET | Refills: 0 | Status: SHIPPED | OUTPATIENT
Start: 2023-10-03 | End: 2023-10-08

## 2023-10-03 ASSESSMENT — ENCOUNTER SYMPTOMS
WHEEZING: 0
SORE THROAT: 0
CHEST TIGHTNESS: 1
SHORTNESS OF BREATH: 0
COUGH: 1
ABDOMINAL PAIN: 0
RHINORRHEA: 1

## 2023-10-03 ASSESSMENT — PAIN DESCRIPTION - LOCATION: LOCATION: RIB CAGE

## 2023-10-03 ASSESSMENT — PAIN - FUNCTIONAL ASSESSMENT: PAIN_FUNCTIONAL_ASSESSMENT: 0-10

## 2023-10-03 ASSESSMENT — PAIN SCALES - GENERAL: PAINLEVEL_OUTOF10: 7

## 2023-10-03 NOTE — ED TRIAGE NOTES
Jessenia Pritchard arrives to room with complaint of  cough started Sat, achy in ribs back and front, vomiting, sweating off and on started Sunday. Vomiting several times a day.        Work note

## 2023-10-03 NOTE — DISCHARGE INSTRUCTIONS
Prednisone as directed    Over-the-counter decongestants may be helpful.     Drink plenty of fluids    Return for new or worsening symptoms

## 2023-10-03 NOTE — ED PROVIDER NOTES
[address]  Urgent Care Encounter       CHIEF COMPLAINT       Chief Complaint   Patient presents with    Cough       Nurses Notes reviewed and I agree except as noted in the HPI. HISTORY OF PRESENT ILLNESS   Shira Baptiste is a 41 y.o. female who presents for complaints of cough, congestion, achiness. Tightness in her chest.  She states she vomited several times yesterday but this was after coughing. No diarrhea. No known fevers. She reports multiple people at her place of employment have similar symptoms. She has not taken anything for her symptoms. HPI    REVIEW OF SYSTEMS     Review of Systems   Constitutional:  Positive for fatigue. Negative for activity change and fever. HENT:  Positive for congestion and rhinorrhea. Negative for sore throat. Respiratory:  Positive for cough and chest tightness. Negative for shortness of breath and wheezing. Cardiovascular:  Negative for chest pain. Gastrointestinal:  Negative for abdominal pain. Neurological:  Negative for dizziness and light-headedness. PAST MEDICAL HISTORY         Diagnosis Date    Blood clot in vein     Blood in urine     Change in bowel habits     Chills     DVT (deep vein thrombosis) in pregnancy     Endometriosis     Fever     Hoarseness     Irregular periods     Liver disease     Migraines     Pulmonary emboli (HCC)     SOB (shortness of breath)        SURGICALHISTORY     Patient  has a past surgical history that includes  section; other surgical history; Ankle surgery (Right); IVC filter insertion; Dilation and curettage of uterus (N/A, 2019); and Upper gastrointestinal endoscopy (2019).     CURRENT MEDICATIONS       Discharge Medication List as of 10/3/2023  2:27 PM        CONTINUE these medications which have NOT CHANGED    Details   vitamin D (ERGOCALCIFEROL) 1.25 MG (24102 UT) CAPS capsule Take 1 capsule by mouth once a week, Disp-12 capsule, R-1Normal      scopolamine to use over-the-counter decongestants for treatment of other symptoms. Drink plenty of fluids. Follow-up primary care provider return here if no improvement in 5 to 7 days. Return sooner for new or worsening symptoms.         PATIENT REFERRED TO:  REECE Mojica CNP  [address] [facility] [address]      DISCHARGE MEDICATIONS:  Discharge Medication List as of 10/3/2023  2:27 PM        START taking these medications    Details   predniSONE (DELTASONE) 20 MG tablet Take 2 tablets by mouth daily for 5 days, Disp-10 tablet, R-0Normal             Discharge Medication List as of 10/3/2023  2:27 PM          Discharge Medication List as of 10/3/2023  2:27 PM          REECE Hu CNP    (Please note that portions of this note were completed with a voice recognition program. Efforts were made to edit the dictations but occasionally words are mis-transcribed.)           REECE Hu CNP  10/03/23 4239

## 2023-10-09 ENCOUNTER — OFFICE VISIT (OUTPATIENT)
Dept: NEUROLOGY | Age: 43
End: 2023-10-09
Payer: COMMERCIAL

## 2023-10-09 VITALS
WEIGHT: 256 LBS | DIASTOLIC BLOOD PRESSURE: 80 MMHG | SYSTOLIC BLOOD PRESSURE: 136 MMHG | BODY MASS INDEX: 43.71 KG/M2 | HEART RATE: 93 BPM | HEIGHT: 64 IN | OXYGEN SATURATION: 96 %

## 2023-10-09 DIAGNOSIS — R20.0 NUMBNESS: ICD-10-CM

## 2023-10-09 DIAGNOSIS — R20.0 FACIAL NUMBNESS: Primary | ICD-10-CM

## 2023-10-09 DIAGNOSIS — G44.89 OTHER HEADACHE SYNDROME: ICD-10-CM

## 2023-10-09 PROCEDURE — 99214 OFFICE O/P EST MOD 30 MIN: CPT | Performed by: NURSE PRACTITIONER

## 2023-10-09 PROCEDURE — G8484 FLU IMMUNIZE NO ADMIN: HCPCS | Performed by: NURSE PRACTITIONER

## 2023-10-09 PROCEDURE — 3078F DIAST BP <80 MM HG: CPT | Performed by: NURSE PRACTITIONER

## 2023-10-09 PROCEDURE — 3074F SYST BP LT 130 MM HG: CPT | Performed by: NURSE PRACTITIONER

## 2023-10-09 PROCEDURE — 1036F TOBACCO NON-USER: CPT | Performed by: NURSE PRACTITIONER

## 2023-10-09 PROCEDURE — G8417 CALC BMI ABV UP PARAM F/U: HCPCS | Performed by: NURSE PRACTITIONER

## 2023-10-09 PROCEDURE — G8427 DOCREV CUR MEDS BY ELIG CLIN: HCPCS | Performed by: NURSE PRACTITIONER

## 2023-10-09 RX ORDER — OXCARBAZEPINE 150 MG/1
150 TABLET, FILM COATED ORAL 2 TIMES DAILY
Qty: 60 TABLET | Refills: 3 | Status: SHIPPED | OUTPATIENT
Start: 2023-10-09

## 2023-10-09 NOTE — PROGRESS NOTES
CONTRAST    Narrative  PROCEDURE: MRI BRAIN W WO CONTRAST    CLINICAL INFORMATIONFacial numbness, Numbness. Facial numbness and bilateral arm and leg numbness for one year. COMPARISON: Brain MRI 7/15/2019. TECHNIQUE: Multiplanar and multiple spin echo T1 and T2-weighted images were obtained through the brain before and after the administration of intravenous contrast. High resolution sagittal FLAIR images were also obtained. FINDINGS:        The diffusion-weighted images are normal. The brain volume is normal.There are no intra-or extra-axial collections. There is no hydrocephalus, midline shift or mass effect. On the FLAIR and T2-weighted sequences, there is normal signal intensity in the brain. On the gradient echo T2-weighted images, there is no susceptibility artifact present. There is no abnormal enhancement in the brain. The major intracranial vascular flow voids are present. The midline craniocervical junction structures are normal.  The brainstem and pituitary gland are normal.    There is no significant change in the appearance of the brain compared to the prior study. Impression  Normal MRI of the brain. **This report has been created using voice recognition software. It may contain minor errors which are inherent in voice recognition technology. **      Final report electronically signed by Dr. Luis A Cook on 6/29/2023 9:59 AM    No results found for this or any previous visit. No results found for this or any previous visit. Assessment:     Diagnosis Orders   1. RIght facial numbness        2. Numbness        3. Other headache syndrome             Follow up for right facial numbness, numbness. She was last seen 6/2023. She is doing the same. She reports episodes of her vision going purple. Her symptoms are subjective. Her exam is non focal.  MRI brain W/WO contrast done 6/29/23 was normal. She does complain of frequent headache.  Her vitamin D was low=27, she is

## 2023-10-09 NOTE — PATIENT INSTRUCTIONS
Start Trileptal 150 mg two times a day  Keep headache diary    Please call 1-2 weeks for an update as to you are doing. Follow up in 3-4 months or sooner if needed. Call if any questions or concerns.

## 2023-11-03 DIAGNOSIS — G43.809 OTHER MIGRAINE WITHOUT STATUS MIGRAINOSUS, NOT INTRACTABLE: ICD-10-CM

## 2023-11-06 RX ORDER — SUMATRIPTAN 25 MG/1
TABLET, FILM COATED ORAL
Qty: 9 TABLET | Refills: 3 | Status: SHIPPED | OUTPATIENT
Start: 2023-11-06

## 2023-11-06 NOTE — TELEPHONE ENCOUNTER
Future Appointments   Date Time Provider [address]   2/9/2024  9:30 AM Meliza Frausto, APRN - [address] [facility] Neurology -    Recent Visits  Date Type Provider Dept   04/18/23 Office Visit REECE Dale - CNP [facility]   11/30/22 Office Visit REECE Dale - CNP [facility]   08/29/22 Office Visit REECE Dale - CNP [facility]   06/29/22 Office Visit Ananya May, Arcadio, APRN - CNP [facility]   Showing recent visits within past 540 days with a meds authorizing provider and meeting all other requirements  Future Appointments  No visits were found meeting these conditions.   Showing future appointments within next 150 days with a meds authorizing provider and meeting all other requirements

## 2023-11-15 ENCOUNTER — TELEPHONE (OUTPATIENT)
Dept: NEUROLOGY | Age: 43
End: 2023-11-15

## 2023-11-15 DIAGNOSIS — G44.89 OTHER HEADACHE SYNDROME: ICD-10-CM

## 2023-11-15 DIAGNOSIS — R20.0 NUMBNESS: ICD-10-CM

## 2023-11-15 DIAGNOSIS — R20.0 FACIAL NUMBNESS: Primary | ICD-10-CM

## 2023-11-15 NOTE — TELEPHONE ENCOUNTER
Patient sent Anct[employer] message stating she forgot to mention problems at last appointment. Patient is having spasms in legs and right side for the past year. She started having spasms in chest and ribs on right side. She is having shooting nerve that feels like electric shocks down her neck and shoulder. She can have the shooting nerve pain and spasms daily. She is having problems with remembering things. She can loose her words in the middle of a sentence. Sometimes she feels her tongue does not move correctly when speaking and she is slurring her speech. She is taking Trileptal 150 mg twice a day since last visit. She is still experiencing symptoms since starting the Trileptal. Please advise. Thank you.

## 2023-12-11 DIAGNOSIS — G47.00 INSOMNIA, UNSPECIFIED TYPE: ICD-10-CM

## 2023-12-11 DIAGNOSIS — M79.7 FIBROMYALGIA: ICD-10-CM

## 2023-12-11 RX ORDER — AMITRIPTYLINE HYDROCHLORIDE 25 MG/1
25 TABLET, FILM COATED ORAL NIGHTLY
Qty: 90 TABLET | Refills: 1 | Status: SHIPPED | OUTPATIENT
Start: 2023-12-11

## 2023-12-11 NOTE — TELEPHONE ENCOUNTER
Recent Visits  Date Type Provider Dept   04/18/23 Office Visit REECE Bowers CNP [facility]   11/30/22 Office Visit REECE Bowers CNP [facility]   08/29/22 Office Visit REECE Bowers CNP [facility]   06/29/22 Office Visit Todd Alcantara, Jan, APRN - CNP [facility]   Showing recent visits within past 540 days with a meds authorizing provider and meeting all other requirements  Future Appointments  No visits were found meeting these conditions.   Showing future appointments within next 150 days with a meds authorizing provider and meeting all other requirements

## 2023-12-14 ENCOUNTER — HOSPITAL ENCOUNTER (EMERGENCY)
Age: 43
Discharge: HOME | End: 2023-12-14
Payer: COMMERCIAL

## 2023-12-14 VITALS
DIASTOLIC BLOOD PRESSURE: 88 MMHG | TEMPERATURE: 97.4 F | HEART RATE: 79 BPM | SYSTOLIC BLOOD PRESSURE: 152 MMHG | OXYGEN SATURATION: 98 % | WEIGHT: 237 LBS | BODY MASS INDEX: 40.68 KG/M2 | RESPIRATION RATE: 20 BRPM

## 2023-12-14 DIAGNOSIS — J01.00 ACUTE NON-RECURRENT MAXILLARY SINUSITIS: Primary | ICD-10-CM

## 2023-12-14 PROCEDURE — 99213 OFFICE O/P EST LOW 20 MIN: CPT

## 2023-12-14 RX ORDER — METHYLPREDNISOLONE 4 MG/1
TABLET ORAL
Qty: 1 KIT | Refills: 0 | Status: SHIPPED | OUTPATIENT
Start: 2023-12-14 | End: 2023-12-20

## 2023-12-14 RX ORDER — AMOXICILLIN AND CLAVULANATE POTASSIUM 875; 125 MG/1; MG/1
1 TABLET, FILM COATED ORAL 2 TIMES DAILY
Qty: 20 TABLET | Refills: 0 | Status: SHIPPED | OUTPATIENT
Start: 2023-12-14 | End: 2023-12-24

## 2023-12-14 RX ORDER — BENZONATATE 100 MG/1
100 CAPSULE ORAL 3 TIMES DAILY PRN
Qty: 30 CAPSULE | Refills: 0 | Status: SHIPPED | OUTPATIENT
Start: 2023-12-14

## 2023-12-14 ASSESSMENT — PAIN - FUNCTIONAL ASSESSMENT
PAIN_FUNCTIONAL_ASSESSMENT: 0-10
PAIN_FUNCTIONAL_ASSESSMENT: ACTIVITIES ARE NOT PREVENTED

## 2023-12-14 ASSESSMENT — PAIN DESCRIPTION - DESCRIPTORS: DESCRIPTORS: ACHING

## 2023-12-14 ASSESSMENT — PAIN SCALES - GENERAL: PAINLEVEL_OUTOF10: 3

## 2023-12-14 ASSESSMENT — PAIN DESCRIPTION - PAIN TYPE: TYPE: ACUTE PAIN

## 2023-12-14 ASSESSMENT — PAIN DESCRIPTION - ORIENTATION: ORIENTATION: RIGHT

## 2023-12-14 ASSESSMENT — PAIN DESCRIPTION - FREQUENCY: FREQUENCY: CONTINUOUS

## 2023-12-14 ASSESSMENT — PAIN DESCRIPTION - LOCATION: LOCATION: EAR

## 2023-12-14 NOTE — ED TRIAGE NOTES
Patient to room with c/o head congestion, intermittent nonproductive cough, sinus pressure, and right ear pain beginning one week ago.

## 2023-12-22 ENCOUNTER — TELEPHONE (OUTPATIENT)
Dept: FAMILY MEDICINE CLINIC | Age: 43
End: 2023-12-22

## 2023-12-22 DIAGNOSIS — B37.31 VAGINAL CANDIDA: Primary | ICD-10-CM

## 2023-12-22 RX ORDER — FLUCONAZOLE 150 MG/1
150 TABLET ORAL
Qty: 2 TABLET | Refills: 0 | Status: SHIPPED | OUTPATIENT
Start: 2023-12-22 | End: 2023-12-26

## 2023-12-26 RX ORDER — FLUCONAZOLE 150 MG/1
150 TABLET ORAL
Qty: 2 TABLET | Refills: 0 | Status: SHIPPED | OUTPATIENT
Start: 2023-12-26 | End: 2024-01-01

## 2023-12-27 ENCOUNTER — TELEPHONE (OUTPATIENT)
Dept: FAMILY MEDICINE CLINIC | Age: 43
End: 2023-12-27

## 2023-12-27 DIAGNOSIS — M62.838 MUSCLE SPASM: Primary | ICD-10-CM

## 2024-01-30 RX ORDER — NAPROXEN 375 MG/1
375 TABLET ORAL 2 TIMES DAILY WITH MEALS
Qty: 180 TABLET | Refills: 1 | Status: SHIPPED | OUTPATIENT
Start: 2024-01-30

## 2024-02-06 RX ORDER — KETOROLAC TROMETHAMINE 10 MG/1
10 TABLET, FILM COATED ORAL EVERY 6 HOURS PRN
Qty: 20 TABLET | Refills: 0 | Status: SHIPPED | OUTPATIENT
Start: 2024-02-06 | End: 2025-02-05

## 2024-02-21 ENCOUNTER — APPOINTMENT (OUTPATIENT)
Dept: ULTRASOUND IMAGING | Age: 44
End: 2024-02-21
Payer: COMMERCIAL

## 2024-02-21 ENCOUNTER — HOSPITAL ENCOUNTER (EMERGENCY)
Age: 44
Discharge: HOME | End: 2024-02-21
Attending: FAMILY MEDICINE
Payer: COMMERCIAL

## 2024-02-21 ENCOUNTER — APPOINTMENT (OUTPATIENT)
Dept: CT IMAGING | Age: 44
End: 2024-02-21
Payer: COMMERCIAL

## 2024-02-21 VITALS
RESPIRATION RATE: 17 BRPM | WEIGHT: 242 LBS | HEIGHT: 63 IN | SYSTOLIC BLOOD PRESSURE: 149 MMHG | BODY MASS INDEX: 42.88 KG/M2 | HEART RATE: 80 BPM | TEMPERATURE: 98 F | DIASTOLIC BLOOD PRESSURE: 94 MMHG | OXYGEN SATURATION: 98 %

## 2024-02-21 DIAGNOSIS — R79.89 ELEVATED LFTS: ICD-10-CM

## 2024-02-21 DIAGNOSIS — R10.11 ABDOMINAL PAIN, RIGHT UPPER QUADRANT: Primary | ICD-10-CM

## 2024-02-21 LAB
ALBUMIN SERPL BCG-MCNC: 4 G/DL (ref 3.5–5.1)
ALP SERPL-CCNC: 103 U/L (ref 38–126)
ALT SERPL W/O P-5'-P-CCNC: 76 U/L (ref 11–66)
ANION GAP SERPL CALC-SCNC: 13 MEQ/L (ref 8–16)
AST SERPL-CCNC: 80 U/L (ref 5–40)
B-HCG SERPL QL: NEGATIVE
BASOPHILS ABSOLUTE: 0 THOU/MM3 (ref 0–0.1)
BASOPHILS NFR BLD AUTO: 0.4 %
BILIRUB SERPL-MCNC: 0.2 MG/DL (ref 0.3–1.2)
BILIRUB UR QL STRIP.AUTO: NEGATIVE
BUN SERPL-MCNC: 12 MG/DL (ref 7–22)
CALCIUM SERPL-MCNC: 8.9 MG/DL (ref 8.5–10.5)
CHARACTER UR: CLEAR
CHLORIDE SERPL-SCNC: 102 MEQ/L (ref 98–111)
CO2 SERPL-SCNC: 25 MEQ/L (ref 23–33)
COLOR: YELLOW
CREAT SERPL-MCNC: 0.7 MG/DL (ref 0.4–1.2)
DEPRECATED RDW RBC AUTO: 45.4 FL (ref 35–45)
EOSINOPHIL NFR BLD AUTO: 2.5 %
EOSINOPHILS ABSOLUTE: 0.2 THOU/MM3 (ref 0–0.4)
ERYTHROCYTE [DISTWIDTH] IN BLOOD BY AUTOMATED COUNT: 13.7 % (ref 11.5–14.5)
GFR SERPL CREATININE-BSD FRML MDRD: > 60 ML/MIN/1.73M2
GLUCOSE SERPL-MCNC: 121 MG/DL (ref 70–108)
GLUCOSE UR QL STRIP.AUTO: NEGATIVE MG/DL
HCT VFR BLD AUTO: 41.8 % (ref 37–47)
HGB BLD-MCNC: 13.4 GM/DL (ref 12–16)
HGB UR QL STRIP.AUTO: NEGATIVE
IMM GRANULOCYTES # BLD AUTO: 0.03 THOU/MM3 (ref 0–0.07)
IMM GRANULOCYTES NFR BLD AUTO: 0.3 %
KETONES UR QL STRIP.AUTO: NEGATIVE
LYMPHOCYTES ABSOLUTE: 3.6 THOU/MM3 (ref 1–4.8)
LYMPHOCYTES NFR BLD AUTO: 37 %
MCH RBC QN AUTO: 28.9 PG (ref 26–33)
MCHC RBC AUTO-ENTMCNC: 32.1 GM/DL (ref 32.2–35.5)
MCV RBC AUTO: 90.1 FL (ref 81–99)
MONOCYTES ABSOLUTE: 0.5 THOU/MM3 (ref 0.4–1.3)
MONOCYTES NFR BLD AUTO: 5.7 %
NEUTROPHILS NFR BLD AUTO: 54.1 %
NITRITE UR QL STRIP: NEGATIVE
NRBC BLD AUTO-RTO: 0 /100 WBC
OSMOLALITY SERPL CALC.SUM OF ELEC: 280.4 MOSMOL/KG (ref 275–300)
PH UR STRIP.AUTO: 6.5 [PH] (ref 5–9)
PLATELET # BLD AUTO: 328 THOU/MM3 (ref 130–400)
PMV BLD AUTO: 9 FL (ref 9.4–12.4)
POTASSIUM SERPL-SCNC: 3.9 MEQ/L (ref 3.5–5.2)
PROT SERPL-MCNC: 7.6 G/DL (ref 6.1–8)
PROT UR STRIP.AUTO-MCNC: NEGATIVE MG/DL
RBC # BLD AUTO: 4.64 MILL/MM3 (ref 4.2–5.4)
SEGMENTED NEUTROPHILS ABSOLUTE COUNT: 5.2 THOU/MM3 (ref 1.8–7.7)
SODIUM SERPL-SCNC: 140 MEQ/L (ref 135–145)
SP GR UR REFRACT.AUTO: 1.02 (ref 1–1.03)
UROBILINOGEN, URINE: 0.2 EU/DL (ref 0–1)
WBC # BLD AUTO: 9.6 THOU/MM3 (ref 4.8–10.8)
WBC #/AREA URNS HPF: NEGATIVE /[HPF]

## 2024-02-21 PROCEDURE — 99285 EMERGENCY DEPT VISIT HI MDM: CPT

## 2024-02-21 PROCEDURE — 6370000000 HC RX 637 (ALT 250 FOR IP)

## 2024-02-21 PROCEDURE — 36415 COLL VENOUS BLD VENIPUNCTURE: CPT

## 2024-02-21 PROCEDURE — 84703 CHORIONIC GONADOTROPIN ASSAY: CPT

## 2024-02-21 PROCEDURE — 74177 CT ABD & PELVIS W/CONTRAST: CPT

## 2024-02-21 PROCEDURE — 81003 URINALYSIS AUTO W/O SCOPE: CPT

## 2024-02-21 PROCEDURE — 76705 ECHO EXAM OF ABDOMEN: CPT

## 2024-02-21 PROCEDURE — 80053 COMPREHEN METABOLIC PANEL: CPT

## 2024-02-21 PROCEDURE — 6360000004 HC RX CONTRAST MEDICATION: Performed by: EMERGENCY MEDICINE

## 2024-02-21 PROCEDURE — 85025 COMPLETE CBC W/AUTO DIFF WBC: CPT

## 2024-02-21 RX ORDER — ACETAMINOPHEN 325 MG/1
650 TABLET ORAL ONCE
Status: COMPLETED | OUTPATIENT
Start: 2024-02-21 | End: 2024-02-21

## 2024-02-21 RX ORDER — DICYCLOMINE HCL 20 MG
20 TABLET ORAL 3 TIMES DAILY PRN
Qty: 8 TABLET | Refills: 0 | Status: SHIPPED | OUTPATIENT
Start: 2024-02-21 | End: 2024-02-24

## 2024-02-21 RX ORDER — ONDANSETRON 4 MG/1
4 TABLET, FILM COATED ORAL EVERY 8 HOURS PRN
Qty: 8 TABLET | Refills: 0 | Status: SHIPPED | OUTPATIENT
Start: 2024-02-21 | End: 2024-02-24

## 2024-02-21 RX ADMIN — ACETAMINOPHEN 650 MG: 325 TABLET ORAL at 18:43

## 2024-02-21 RX ADMIN — IOPAMIDOL 80 ML: 755 INJECTION, SOLUTION INTRAVENOUS at 19:52

## 2024-02-21 ASSESSMENT — PAIN DESCRIPTION - ORIENTATION: ORIENTATION: RIGHT

## 2024-02-21 ASSESSMENT — PAIN DESCRIPTION - LOCATION: LOCATION: ABDOMEN;FLANK

## 2024-02-21 ASSESSMENT — PAIN - FUNCTIONAL ASSESSMENT: PAIN_FUNCTIONAL_ASSESSMENT: 0-10

## 2024-02-21 ASSESSMENT — PAIN SCALES - GENERAL
PAINLEVEL_OUTOF10: 4
PAINLEVEL_OUTOF10: 5

## 2024-02-21 NOTE — ED TRIAGE NOTES
Pt presents to the ED through lobby with c/o abdominal pain and flank pain. Pt states she has been having pain for approx 2 weeks and the pain has increased over the last week. Denies history of kidney stones. Denies dysuria. States pain is worse with movement. Currently 5 out of 10.states her only abdominal surgeries are 2 c-sections. States pain is in her \"right lower back and wraps around to the front of her stomach on the right side\". States she was supposed to start her menstrual cycle a few days ago but has not yet.

## 2024-02-22 NOTE — ED PROVIDER NOTES
[facility]  EMERGENCY DEPARTMENT ENCOUNTER   PHYSICIAN ATTESTATION    Pt Name: Shivani Osorio  MRN: [id number]  Birthdate 1980  Date of evaluation: 9/12/20      I personally saw and examined the patient. I have reviewed and agree with the Resident findings, including all diagnostic interpretations and treatment plans as written. I was present for the key portion of any procedures performed and the inclusive time noted in any critical care statement.       History:     This patient was seen with , resident physician    43-year-old female whose pain is mostly in the right upper quadrant and we thought that it might be biliary colic but there was no evidence of significant findings on the right upper quadrant ultrasound although we do note that her liver transaminases are elevated.  CT showing fatty infiltration of the liver with hepatomegaly.  No acute surgical issues.  We are referring her to GI on an outpatient basis.                DO Gino Andino Lawrence, DO  02/22/24 0236    
GASTROINTESTINAL ENDOSCOPY  12/28/2019             MEDICATIONS   No current facility-administered medications for this encounter.    Current Outpatient Medications:     ketorolac (TORADOL) 10 MG tablet, Take 1 tablet by mouth every 6 hours as needed for Pain, Disp: 20 tablet, Rfl: 0    naproxen (NAPROSYN) 375 MG tablet, Take 1 tablet by mouth 2 times daily (with meals), Disp: 180 tablet, Rfl: 1    benzonatate (TESSALON PERLES) 100 MG capsule, Take 1 capsule by mouth 3 times daily as needed for Cough, Disp: 30 capsule, Rfl: 0    amitriptyline (ELAVIL) 25 MG tablet, Take 1 tablet by mouth nightly, Disp: 90 tablet, Rfl: 1    SUMAtriptan (IMITREX) 25 MG tablet, Take 1 tablet by mouth at onset of headache, may repeat in 1 hour if no better. Not to exceed two doses in 24 hours., Disp: 9 tablet, Rfl: 3    OXcarbazepine (TRILEPTAL) 150 MG tablet, Take 1 tablet by mouth 2 times daily, Disp: 60 tablet, Rfl: 3    vitamin D (ERGOCALCIFEROL) 1.25 MG (85819 UT) CAPS capsule, Take 1 capsule by mouth once a week, Disp: 12 capsule, Rfl: 1    scopolamine (TRANSDERM-SCOP) transdermal patch, Place 1 patch onto the skin every 72 hours (Patient not taking: Reported on 10/9/2023), Disp: 3 patch, Rfl: 0    DULoxetine (CYMBALTA) 30 MG extended release capsule, Take 1 capsule by mouth daily, Disp: 90 capsule, Rfl: 3    Azelaic Acid 15 % GEL, Apply 1 applicator topically See Admin Instructions Apply topically bid, Disp: 1 each, Rfl: 5    lisinopril (PRINIVIL;ZESTRIL) 10 MG tablet, Take 1 tablet by mouth daily, Disp: 90 tablet, Rfl: 1    albuterol sulfate HFA (PROVENTIL HFA) 108 (90 Base) MCG/ACT inhaler, Inhale 2 puffs into the lungs every 6 hours as needed for Wheezing, Disp: 18 g, Rfl: 3    fluticasone (FLONASE) 50 MCG/ACT nasal spray, 1 spray by Each Nostril route daily, Disp: 32 g, Rfl: 1    B Complex Vitamins (VITAMIN-B COMPLEX PO), Take by mouth daily, Disp: , Rfl:     Current Discharge Medication List        CONTINUE these

## 2024-02-22 NOTE — DISCHARGE INSTRUCTIONS
You were seen and evaluated emergency department today for pain in your right side abdomen.  Lab work, ultrasound, CT scans showed abnormal liver function with enlargement of your liver known as fatty liver changes.  We believe that you are stable for discharge home please call and schedule appointment to see a gastroenterologist soon as possible for further evaluation.  You may return to emergency department anytime for acute or worrisome changes including severe abdominal pain uncontrolled at home, significant fever greater than 100.4, any blood in your vomiting, stools, urine, any other worrisome changes.

## 2024-02-26 ENCOUNTER — OFFICE VISIT (OUTPATIENT)
Dept: FAMILY MEDICINE CLINIC | Age: 44
End: 2024-02-26
Payer: COMMERCIAL

## 2024-02-26 VITALS
RESPIRATION RATE: 12 BRPM | BODY MASS INDEX: 45.5 KG/M2 | WEIGHT: 256.8 LBS | OXYGEN SATURATION: 96 % | DIASTOLIC BLOOD PRESSURE: 78 MMHG | HEIGHT: 63 IN | SYSTOLIC BLOOD PRESSURE: 136 MMHG | HEART RATE: 85 BPM | TEMPERATURE: 97.5 F

## 2024-02-26 DIAGNOSIS — M62.830 LUMBAR PARASPINAL MUSCLE SPASM: ICD-10-CM

## 2024-02-26 DIAGNOSIS — F41.9 ANXIETY: ICD-10-CM

## 2024-02-26 DIAGNOSIS — R10.11 RUQ PAIN: Primary | ICD-10-CM

## 2024-02-26 DIAGNOSIS — E66.01 MORBID OBESITY DUE TO EXCESS CALORIES (HCC): ICD-10-CM

## 2024-02-26 DIAGNOSIS — K76.0 HEPATIC STEATOSIS: ICD-10-CM

## 2024-02-26 DIAGNOSIS — M54.50 LUMBAR PAIN: ICD-10-CM

## 2024-02-26 DIAGNOSIS — R16.0 HEPATOMEGALY: ICD-10-CM

## 2024-02-26 DIAGNOSIS — R74.01 TRANSAMINITIS: ICD-10-CM

## 2024-02-26 PROCEDURE — G8417 CALC BMI ABV UP PARAM F/U: HCPCS | Performed by: NURSE PRACTITIONER

## 2024-02-26 PROCEDURE — 3078F DIAST BP <80 MM HG: CPT | Performed by: NURSE PRACTITIONER

## 2024-02-26 PROCEDURE — 3075F SYST BP GE 130 - 139MM HG: CPT | Performed by: NURSE PRACTITIONER

## 2024-02-26 PROCEDURE — 1036F TOBACCO NON-USER: CPT | Performed by: NURSE PRACTITIONER

## 2024-02-26 PROCEDURE — 99215 OFFICE O/P EST HI 40 MIN: CPT | Performed by: NURSE PRACTITIONER

## 2024-02-26 PROCEDURE — G8427 DOCREV CUR MEDS BY ELIG CLIN: HCPCS | Performed by: NURSE PRACTITIONER

## 2024-02-26 PROCEDURE — G8484 FLU IMMUNIZE NO ADMIN: HCPCS | Performed by: NURSE PRACTITIONER

## 2024-02-26 RX ORDER — KETOROLAC TROMETHAMINE 10 MG/1
10 TABLET, FILM COATED ORAL EVERY 6 HOURS PRN
Qty: 20 TABLET | Refills: 0 | Status: SHIPPED | OUTPATIENT
Start: 2024-02-26 | End: 2025-02-25

## 2024-02-26 RX ORDER — TIZANIDINE 2 MG/1
2 TABLET ORAL NIGHTLY PRN
Qty: 20 TABLET | Refills: 1 | Status: SHIPPED | OUTPATIENT
Start: 2024-02-26

## 2024-02-26 RX ORDER — BUPROPION HYDROCHLORIDE 150 MG/1
150 TABLET ORAL EVERY MORNING
Qty: 30 TABLET | Refills: 3 | Status: SHIPPED | OUTPATIENT
Start: 2024-02-26

## 2024-02-26 RX ORDER — PREGABALIN 75 MG/1
75 CAPSULE ORAL 2 TIMES DAILY
COMMUNITY

## 2024-02-26 ASSESSMENT — PATIENT HEALTH QUESTIONNAIRE - PHQ9
2. FEELING DOWN, DEPRESSED OR HOPELESS: 1
1. LITTLE INTEREST OR PLEASURE IN DOING THINGS: SEVERAL DAYS
SUM OF ALL RESPONSES TO PHQ QUESTIONS 1-9: 2
SUM OF ALL RESPONSES TO PHQ QUESTIONS 1-9: 2
SUM OF ALL RESPONSES TO PHQ9 QUESTIONS 1 & 2: 2
SUM OF ALL RESPONSES TO PHQ QUESTIONS 1-9: 2
1. LITTLE INTEREST OR PLEASURE IN DOING THINGS: 1
2. FEELING DOWN, DEPRESSED OR HOPELESS: SEVERAL DAYS
SUM OF ALL RESPONSES TO PHQ QUESTIONS 1-9: 2
SUM OF ALL RESPONSES TO PHQ9 QUESTIONS 1 & 2: 2

## 2024-02-26 ASSESSMENT — ENCOUNTER SYMPTOMS
ABDOMINAL PAIN: 1
PHOTOPHOBIA: 0
RESPIRATORY NEGATIVE: 1
BACK PAIN: 1

## 2024-02-26 NOTE — PROGRESS NOTES
Coronary Art Dis Father     No Known Problems Brother     Early Death Neg Hx     Colon Cancer Neg Hx     Colon Polyps Neg Hx     Esophageal Cancer Neg Hx            I have reviewed the patient's past medical history, past surgical history, allergies, medications, social and family history and I have made updates where appropriate.                      ASSESSMENT & PLAN  Shivani was seen today for weight management and health maintenance.    Diagnoses and all orders for this visit:    RUQ pain  -not consistent with cholecystitis  R/o pain from hepatomegaly  Refer back to GI  Avoid tylenol as it is toxic to liver  Monitor  May be due to lumbar strain  Exam not consistent with ruptured or bulging disc of lumbar area.  Hepatitis panel ordered to r/o hepatitis    Hepatic steatosis  -     Hepatitis Panel, Acute; Future  -     Ambulatory referral to Gastroenterology  As above   Hepatomegaly  -     Hepatitis Panel, Acute; Future  -     Ambulatory referral to Gastroenterology    Transaminitis  -     Hepatitis Panel, Acute; Future  -     Ambulatory referral to Gastroenterology  As above   Morbid obesity due to excess calories (HCC)  Discussed in detail low carb diet less than 100 gms/day  Low calorie diet 1500 daily  Daily aerobic activity  R/o insulin resistance vs diabetes  Orders placed   Declines nutrition referral today   Start wellbutrin and metformin   -     buPROPion (WELLBUTRIN XL) 150 MG extended release tablet; Take 1 tablet by mouth every morning  -     metFORMIN (GLUCOPHAGE) 500 MG tablet; Take 1 tablet by mouth 2 times daily (with meals)  -     Hemoglobin A1C; Future  -     Insulin, Fasting; Future  -     C-Peptide; Future    Lumbar paraspinal muscle spasm  As above  Likely due to lumbar strain/muscle spasms as exam not consistent with ruptured or bulging disc or foraminal stenosis  F/u with [facility] if no better   States prednisone does help temporarily   -     tiZANidine (ZANAFLEX) 2 MG tablet; Take 1 tablet by

## 2024-02-27 ENCOUNTER — TELEPHONE (OUTPATIENT)
Dept: FAMILY MEDICINE CLINIC | Age: 44
End: 2024-02-27

## 2024-02-27 DIAGNOSIS — M62.830 LUMBAR PARASPINAL MUSCLE SPASM: ICD-10-CM

## 2024-02-27 DIAGNOSIS — M54.50 LUMBAR PAIN: ICD-10-CM

## 2024-02-27 NOTE — TELEPHONE ENCOUNTER
[facility] called and stated they need a specific gram per each application.   Directions say to do apply 2x daily, but does not have the amount

## 2024-02-29 ENCOUNTER — HOSPITAL ENCOUNTER (OUTPATIENT)
Age: 44
Discharge: HOME | End: 2024-02-29
Payer: COMMERCIAL

## 2024-02-29 ENCOUNTER — HOSPITAL ENCOUNTER (OUTPATIENT)
Dept: GENERAL RADIOLOGY | Age: 44
Discharge: HOME | End: 2024-02-29
Payer: COMMERCIAL

## 2024-02-29 DIAGNOSIS — K76.0 HEPATIC STEATOSIS: ICD-10-CM

## 2024-02-29 DIAGNOSIS — R16.0 HEPATOMEGALY: ICD-10-CM

## 2024-02-29 DIAGNOSIS — M62.830 LUMBAR PARASPINAL MUSCLE SPASM: ICD-10-CM

## 2024-02-29 DIAGNOSIS — E66.01 MORBID OBESITY DUE TO EXCESS CALORIES (HCC): ICD-10-CM

## 2024-02-29 DIAGNOSIS — M54.50 LUMBAR PAIN: ICD-10-CM

## 2024-02-29 DIAGNOSIS — R74.01 TRANSAMINITIS: ICD-10-CM

## 2024-02-29 LAB
DEPRECATED MEAN GLUCOSE BLD GHB EST-ACNC: 144 MG/DL (ref 70–126)
HAV IGM SER QL: NEGATIVE
HBA1C MFR BLD HPLC: 6.8 % (ref 4.4–6.4)
HBV CORE IGM SERPL QL IA: NEGATIVE
HBV SURFACE AG SERPL QL IA: NEGATIVE
HCV IGG SERPL QL IA: NEGATIVE

## 2024-02-29 PROCEDURE — 72202 X-RAY EXAM SI JOINTS 3/> VWS: CPT

## 2024-02-29 PROCEDURE — 80074 ACUTE HEPATITIS PANEL: CPT

## 2024-02-29 PROCEDURE — 83036 HEMOGLOBIN GLYCOSYLATED A1C: CPT

## 2024-02-29 PROCEDURE — 84681 ASSAY OF C-PEPTIDE: CPT

## 2024-02-29 PROCEDURE — 83525 ASSAY OF INSULIN: CPT

## 2024-02-29 PROCEDURE — 36415 COLL VENOUS BLD VENIPUNCTURE: CPT

## 2024-02-29 PROCEDURE — 72110 X-RAY EXAM L-2 SPINE 4/>VWS: CPT

## 2024-03-01 ENCOUNTER — TELEPHONE (OUTPATIENT)
Dept: FAMILY MEDICINE CLINIC | Age: 44
End: 2024-03-01

## 2024-03-01 DIAGNOSIS — M46.1 SACROILIITIS (HCC): ICD-10-CM

## 2024-03-01 DIAGNOSIS — M47.9 SPONDYLOSIS: ICD-10-CM

## 2024-03-01 DIAGNOSIS — M54.50 LUMBAR PAIN: Primary | ICD-10-CM

## 2024-03-01 LAB
C PEPTIDE SERPL-MCNC: 5.4 NG/ML (ref 1.1–4.4)
INSULIN SERPL-ACNC: 43.5 MU/L

## 2024-03-01 NOTE — TELEPHONE ENCOUNTER
Pt informed and understanding with no further questions at this time.     Patient is agreeable to f/u with ortho, said meds seem to help a little bit with the pain.

## 2024-03-01 NOTE — TELEPHONE ENCOUNTER
I have placed a referral to Orthopedics for her at [facility]. Please print off the referral please print out the referral and start the referral process. Let pt know [facility] will be contacting her for an appt soon! Thanks

## 2024-03-01 NOTE — TELEPHONE ENCOUNTER
----- Message from REECE Galarza CNP sent at 2/29/2024  5:29 PM EST -----  Let pt know her x-rays shows.   1. Slight thoracolumbar levoscoliosis. Cannot exclude muscle spasm right side.  2. Minimal spondylosis scattered in the lumbar spine. Disc spaces well-maintained. No fracture. No evidence for spondylolysis or spondylolisthesis.  3. Moderate degenerative facet arthropathy 2 lower lumbar levels. Moderate degenerative changes both sacroiliac joints. Mild degenerative changes left hip.      These are signs suggestive of arthritis, I recommend NSAIDS to treat pain, continue with cymbalta as this can also help with pain along with elavil,  and I recommend f/u with ortho if agreeable.  Are her current meds helping her pain?

## 2024-03-01 NOTE — TELEPHONE ENCOUNTER
Pt informed and understanding with no further questions at this time.     Patient uses [facility] - does not have a glucometer.    Advised patient to call Monday so we can schedule a nurse visit and make sure she has her glucometer first before putting on NV schedule

## 2024-03-04 ENCOUNTER — TELEPHONE (OUTPATIENT)
Dept: FAMILY MEDICINE CLINIC | Age: 44
End: 2024-03-04

## 2024-03-04 NOTE — TELEPHONE ENCOUNTER
----- Message from REECE Galarza CNP sent at 3/4/2024  1:02 PM EST -----  Let pt know her c-peptide and fasting insulin level are back and she is insulin resistant, which we discussed as a possibility at her visit, this is treated with diabetic meds such as metformin which she is on. Along with weight loss.

## 2024-03-13 ENCOUNTER — OFFICE VISIT (OUTPATIENT)
Dept: URGENT CARE | Facility: CLINIC | Age: 44
End: 2024-03-13
Payer: COMMERCIAL

## 2024-03-13 ENCOUNTER — HOSPITAL ENCOUNTER (OUTPATIENT)
Dept: RADIOLOGY | Facility: CLINIC | Age: 44
Discharge: HOME | End: 2024-03-13
Payer: COMMERCIAL

## 2024-03-13 VITALS
HEART RATE: 89 BPM | SYSTOLIC BLOOD PRESSURE: 137 MMHG | DIASTOLIC BLOOD PRESSURE: 80 MMHG | RESPIRATION RATE: 14 BRPM | TEMPERATURE: 98.4 F | OXYGEN SATURATION: 95 %

## 2024-03-13 DIAGNOSIS — S49.91XA INJURY OF RIGHT UPPER EXTREMITY, INITIAL ENCOUNTER: ICD-10-CM

## 2024-03-13 DIAGNOSIS — S52.124A CLOSED NONDISPLACED FRACTURE OF HEAD OF RIGHT RADIUS, INITIAL ENCOUNTER: Primary | ICD-10-CM

## 2024-03-13 PROCEDURE — 73090 X-RAY EXAM OF FOREARM: CPT | Mod: RT

## 2024-03-13 PROCEDURE — 73060 X-RAY EXAM OF HUMERUS: CPT | Mod: RIGHT SIDE | Performed by: RADIOLOGY

## 2024-03-13 PROCEDURE — 73090 X-RAY EXAM OF FOREARM: CPT | Mod: RIGHT SIDE | Performed by: RADIOLOGY

## 2024-03-13 PROCEDURE — 99213 OFFICE O/P EST LOW 20 MIN: CPT

## 2024-03-13 PROCEDURE — 73060 X-RAY EXAM OF HUMERUS: CPT | Mod: RT

## 2024-03-13 RX ORDER — HYDROCODONE BITARTRATE AND ACETAMINOPHEN 5; 325 MG/1; MG/1
1 TABLET ORAL EVERY 6 HOURS PRN
Qty: 12 TABLET | Refills: 0 | Status: SHIPPED | OUTPATIENT
Start: 2024-03-13 | End: 2024-03-16

## 2024-03-13 RX ORDER — METFORMIN HYDROCHLORIDE 500 MG/1
500 TABLET ORAL
COMMUNITY
Start: 2024-02-26

## 2024-03-13 RX ORDER — LISINOPRIL 10 MG/1
10 TABLET ORAL EVERY MORNING
COMMUNITY
Start: 2024-02-23

## 2024-03-13 RX ORDER — PREGABALIN 50 MG/1
50 CAPSULE ORAL 2 TIMES DAILY
COMMUNITY
Start: 2024-02-07

## 2024-03-13 RX ORDER — AMITRIPTYLINE HYDROCHLORIDE 25 MG/1
25 TABLET, FILM COATED ORAL NIGHTLY
COMMUNITY
Start: 2024-02-23

## 2024-03-13 RX ORDER — DULOXETIN HYDROCHLORIDE 30 MG/1
30 CAPSULE, DELAYED RELEASE ORAL NIGHTLY
COMMUNITY
Start: 2024-02-20

## 2024-03-13 NOTE — PROGRESS NOTES
[facility] URGENT CARE [place] NOTE:      Name: Siobhan Brooks, 43 y.o.    CSN:[id number]   MRN:[id number]    PCP: No primary care provider on file.    ALL:  No Known Allergies    History:    Chief Complaint: R ARM PAIN (AFTER FALLING THIS MORNING)    Encounter Date: 3/13/2024      HPI: The history was obtained from the patient. Siobhan is a 43 y.o. female, who presents with a chief complaint of R ARM PAIN (AFTER FALLING THIS MORNING). She states she fell while carrying her dog and landed directly on her forearm. She states her mother had a sling at her house and she presents wearing it on her right arm. She denies los, swelling, erythema, ecchymosis.     PMHx:    History reviewed. No pertinent past medical history.           Current Outpatient Medications   Medication Sig Dispense Refill    amitriptyline (Elavil) 25 mg tablet Take 1 tablet (25 mg) by mouth once daily at bedtime.      DULoxetine (Cymbalta) 30 mg DR capsule Take 1 capsule (30 mg) by mouth once daily.      lisinopril 10 mg tablet Take 1 tablet (10 mg) by mouth once daily.      metFORMIN (Glucophage) 500 mg tablet Take 1 tablet (500 mg) by mouth 2 times a day with meals.      pregabalin (Lyrica) 50 mg capsule Take 1 capsule (50 mg) by mouth 2 times a day.       No current facility-administered medications for this visit.         PMSx:    Past Surgical History:   Procedure Laterality Date     SECTION, CLASSIC         Fam Hx: No family history on file.    SOC. Hx:     Social History     Socioeconomic History    Marital status:      Spouse name: Not on file    Number of children: Not on file    Years of education: Not on file    Highest education level: Not on file   Occupational History    Not on file   Tobacco Use    Smoking status: Never    Smokeless tobacco: Never   Substance and Sexual Activity    Alcohol use: Not on file    Drug use: Not on file    Sexual activity: Not on file   Other Topics Concern    Not on file    Social History Narrative    Not on file     Social Determinants of Health     Financial Resource Strain: Not on file   Food Insecurity: Not on file   Transportation Needs: Not on file   Physical Activity: Not on file   Stress: Not on file   Social Connections: Not on file   Intimate Partner Violence: Not on file   Housing Stability: Not on file         Vitals:    03/13/24 1512   BP: 137/80   Pulse: 89   Resp: 14   Temp: 36.9 °C (98.4 °F)   SpO2: 95%                Physical Exam  Constitutional:       Appearance: Normal appearance.   HENT:      Right Ear: Tympanic membrane normal.      Left Ear: Tympanic membrane normal.      Nose: Nose normal.      Mouth/Throat:      Mouth: Mucous membranes are moist.      Pharynx: Oropharynx is clear.   Eyes:      Conjunctiva/sclera: Conjunctivae normal.      Pupils: Pupils are equal, round, and reactive to light.   Cardiovascular:      Rate and Rhythm: Normal rate and regular rhythm.   Pulmonary:      Effort: Pulmonary effort is normal.      Breath sounds: Normal breath sounds.   Musculoskeletal:        Arms:       Comments: Tenderness to R forearm and humerus region. Unable to fully assess as patient is not able to move arm. There is no visible ecchymosis, erythema, edema noted. Sensation is intact.    Skin:     General: Skin is warm.   Neurological:      General: No focal deficit present.      Mental Status: She is alert and oriented to person, place, and time.   Psychiatric:         Mood and Affect: Mood normal.         Behavior: Behavior normal.     LABORATORY @ RADIOLOGICAL IMAGING (if done):   Narrative & Impression  Interpreted By:  Finesse Smith,   STUDY:  XR FOREARM RIGHT 2 VIEWS; XR HUMERUS RIGHT      INDICATION:  Signs/Symptoms:fall.      COMPARISON:  None      ACCESSION NUMBER(S):  [id number]; [id number]      ORDERING CLINICIAN:  MICHAEL WHITLEY      FINDINGS:  Intra-articular fracture of the right radial head.      No evidence of other right humerus or right  forearm abnormality.      IMPRESSION:  Intra-articular right radial head fracture. This could be more  definitively assessed with elbow radiographs.      Signed by: Finesse Smith 3/13/2024 5:14 PM  Dictation workstation:   [id number]     COURSE/MEDICAL DECISION MAKING:    Siobhan is a 43 y.o., who presents with a working diagnosis of   1. Injury of right upper extremity, initial encounter      Siobhan was seen today for r arm pain.  Diagnoses and all orders for this visit:  Injury of right upper extremity, initial encounter (Primary)  -     XR forearm right 2 views; Future  -     XR humerus right; Future  PROCEDURE:   splint application & evaluation            Because of the condition described above in the chart, I decided it would be best for her injury to be immobilized.  A 4inch OCL,  sugar gi splint was placed on the right arm by me with assistance by the MA.  After application, I evaluated the extremity and deemed it to be satisfactorily immobilized.  In addition, her distal neurological and vascular examination was unchanged, and without evidence of compartment syndrome. she tolerated the procedure well.    OARRS Reviewed.     Patient has a radial head fracture to the R arm. Patient placed in splint in clinic. Will send in Norco for significant pain patient is experiencing. Ice may help. Ortho referral placed. She is to follow up with them within 1 weeks time for further management.      Discussed with Siobhan if she notices loss of sensation, discoloration of arm or fingers, tinging sensation she is to remove the splint and report directly to our  or ER for re-application. Patient was agreeable to this plan.     Blanquita Willis PA-C   Advanced Practice Provider  [facility] URGENT CARE

## 2024-03-19 ENCOUNTER — HOSPITAL ENCOUNTER (OUTPATIENT)
Dept: RADIOLOGY | Facility: CLINIC | Age: 44
Discharge: HOME | End: 2024-03-19
Payer: COMMERCIAL

## 2024-03-19 ENCOUNTER — OFFICE VISIT (OUTPATIENT)
Dept: ORTHOPEDIC SURGERY | Facility: CLINIC | Age: 44
End: 2024-03-19
Payer: COMMERCIAL

## 2024-03-19 DIAGNOSIS — Z87.81: ICD-10-CM

## 2024-03-19 DIAGNOSIS — S52.121A DISP FX OF HEAD OF RIGHT RADIUS, INIT FOR CLOS FX: Primary | ICD-10-CM

## 2024-03-19 DIAGNOSIS — S52.124A CLOSED NONDISPLACED FRACTURE OF HEAD OF RIGHT RADIUS, INITIAL ENCOUNTER: ICD-10-CM

## 2024-03-19 PROCEDURE — 73070 X-RAY EXAM OF ELBOW: CPT | Mod: RIGHT SIDE | Performed by: RADIOLOGY

## 2024-03-19 PROCEDURE — 1036F TOBACCO NON-USER: CPT | Performed by: NURSE PRACTITIONER

## 2024-03-19 PROCEDURE — 73070 X-RAY EXAM OF ELBOW: CPT | Mod: RT

## 2024-03-19 PROCEDURE — 99204 OFFICE O/P NEW MOD 45 MIN: CPT | Performed by: NURSE PRACTITIONER

## 2024-03-19 RX ORDER — HYDROCODONE BITARTRATE AND ACETAMINOPHEN 5; 325 MG/1; MG/1
1 TABLET ORAL EVERY 6 HOURS PRN
Qty: 20 TABLET | Refills: 0 | Status: SHIPPED | OUTPATIENT
Start: 2024-03-19 | End: 2024-03-26

## 2024-03-19 RX ORDER — IBUPROFEN 600 MG/1
600 TABLET ORAL EVERY 8 HOURS PRN
Qty: 28 TABLET | Refills: 2 | Status: SHIPPED | OUTPATIENT
Start: 2024-03-19

## 2024-03-19 ASSESSMENT — ENCOUNTER SYMPTOMS
CARDIOVASCULAR NEGATIVE: 1
PSYCHIATRIC NEGATIVE: 1
CONSTITUTIONAL NEGATIVE: 1
RESPIRATORY NEGATIVE: 1
ARTHRALGIAS: 1
ENDOCRINE NEGATIVE: 1
NEUROLOGICAL NEGATIVE: 1
HEMATOLOGIC/LYMPHATIC NEGATIVE: 1

## 2024-03-19 ASSESSMENT — PAIN DESCRIPTION - DESCRIPTORS: DESCRIPTORS: SORE

## 2024-03-19 ASSESSMENT — PAIN - FUNCTIONAL ASSESSMENT: PAIN_FUNCTIONAL_ASSESSMENT: 0-10

## 2024-03-19 ASSESSMENT — PAIN SCALES - GENERAL: PAINLEVEL_OUTOF10: 9

## 2024-03-19 NOTE — PROGRESS NOTES
Subjective    Patient ID: Siobhan Brooks is a 43 y.o. female.    Chief Complaint: Pain of the Right Elbow       HPI  Siobhan is a pleasant 43-year-old patient presenting as new patient visit for right radial fracture.  Patient states she tripped over the lip of a door while walking a dog and fell onto carpet landing on her elbow.  She felt and heard a pop, sought ED treatment.  Patient states she has some constant pain approximately a 3, worse with attempts with moving it and lifting it.  Patient does take as needed Norco at night which does seem to help.  She is using sling with good support, comes out twice daily for gentle range of motion.  Patient is taking some OTC ibuprofen with improvement.  Patient accompanied by her partner for today's visit.  Patient is right-hand dominant.  Patient does have history of DVT after  and DVT and PE after Achilles tendon injury in the past.  She is not currently on any blood thinners.      Review of Systems   Constitutional: Negative.    HENT: Negative.     Respiratory: Negative.     Cardiovascular: Negative.    Endocrine: Negative.    Musculoskeletal:  Positive for arthralgias.   Skin: Negative.    Neurological: Negative.    Hematological: Negative.    Psychiatric/Behavioral: Negative.         Objective   Right Elbow Exam     Tenderness   The patient is experiencing tenderness in the radial head.     Range of Motion   Flexion:  80     Tests   Tinel's sign (cubital tunnel): negative    Other   Erythema: absent  Sensation: normal  Pulse: present    Comments:  Moderate swelling to the elbow with ecchymosis present, extends to mid forearm.  There is slight edema over the hand and fingers.  Patient with guarded range of motion of hand and fingers, distal neurovascular intact with cap refill at 2 to 3 seconds.  Patient with limited motion at the wrist, especially with extension as it aggravates symptoms up into the forearm and elbow.  Significant limited pronation and  supination secondary to aggravation of pain.  Patient with good range of motion of the shoulder.            Image Results:  XR forearm right 2 views, XR humerus right  Narrative: Interpreted By:  Finesse Smith,   STUDY:  XR FOREARM RIGHT 2 VIEWS; XR HUMERUS RIGHT      INDICATION:  Signs/Symptoms:fall.      COMPARISON:  None      ACCESSION NUMBER(S):  [id number]; [id number]      ORDERING CLINICIAN:  MICHAEL WHITLEY      FINDINGS:  Intra-articular fracture of the right radial head.      No evidence of other right humerus or right forearm abnormality.      Impression: Intra-articular right radial head fracture. This could be more  definitively assessed with elbow radiographs.      Signed by: Finsese Smith 3/13/2024 5:14 PM  Dictation workstation:   [id number]    Independent review of ED films reviewed prior to today's visit with Dr. Inman.  Elbow x-rays ordered, independent review shows mild displacement of the radial head, no no other acute injuries identified.  Await radiology report.    Assessment/Plan       Problem List Items Addressed This Visit             ICD-10-CM    Disp fx of head of right radius, init for clos fx - Primary S52.121A     Sx control with ibuprofen 600 mg, recommend 3 times daily with food over the next several days for swelling, stiffness and pain control, then may decrease to as needed.  Rx Norco provided for moderate to severe pain every 8 hours as needed, OARRS report was personally reviewed on date of visit.  I have considered the abuse, dependence, addiction and diversion.  I believe that this is clinically appropriate for the patient to be prescribed this medication.  Cast padding and Ace wrap for light compression to the elbow, fitted in sling with good fit and support.  HEP: Encouraged to move wrist hand and fingers frequently throughout the day, may gently flex and extend at the elbow as well as work at pronation supination as tolerated  Lifting restriction recommended:  Nonweightbearing, work note provided  We discussed DVT prophylaxis with 162 mg OTC aspirin daily.  Reviewed signs and symptoms and to seek eval for any concerns for DVT including redness, increased pain, swelling or other changes or concerns  Follow up here 1 week with Dr. Inman, sooner for changes or concerns.    This note was generated using Dragon software.  It may contain errors in wording, punctuation or spelling.         Relevant Medications    HYDROcodone-acetaminophen (Norco) 5-325 mg tablet    ibuprofen 600 mg tablet     Other Visit Diagnoses         Codes    Hx of fracture of arm     Z87.81    Relevant Orders    XR elbow right 1-2 views    Closed nondisplaced fracture of head of right radius, initial encounter     S52.570X

## 2024-03-20 PROBLEM — S52.121A: Status: ACTIVE | Noted: 2024-03-20

## 2024-03-21 NOTE — ASSESSMENT & PLAN NOTE
Sx control with ibuprofen 600 mg, recommend 3 times daily with food over the next several days for swelling, stiffness and pain control, then may decrease to as needed.  Rx Norco provided for moderate to severe pain every 8 hours as needed, OARRS report was personally reviewed on date of visit.  I have considered the abuse, dependence, addiction and diversion.  I believe that this is clinically appropriate for the patient to be prescribed this medication.  Cast padding and Ace wrap for light compression to the elbow, fitted in sling with good fit and support.  HEP: Encouraged to move wrist hand and fingers frequently throughout the day, may gently flex and extend at the elbow as well as work at pronation supination as tolerated  Lifting restriction recommended: Nonweightbearing, work note provided  We discussed DVT prophylaxis with 162 mg OTC aspirin daily.  Reviewed signs and symptoms and to seek eval for any concerns for DVT including redness, increased pain, swelling or other changes or concerns  Follow up here 1 week with Dr. Inman, sooner for changes or concerns.    This note was generated using Dragon software.  It may contain errors in wording, punctuation or spelling.

## 2024-03-28 ENCOUNTER — OFFICE VISIT (OUTPATIENT)
Dept: FAMILY MEDICINE CLINIC | Age: 44
End: 2024-03-28
Payer: COMMERCIAL

## 2024-03-28 VITALS
TEMPERATURE: 97.9 F | BODY MASS INDEX: 44.58 KG/M2 | OXYGEN SATURATION: 96 % | HEIGHT: 63 IN | WEIGHT: 251.6 LBS | HEART RATE: 76 BPM | RESPIRATION RATE: 16 BRPM | SYSTOLIC BLOOD PRESSURE: 132 MMHG | DIASTOLIC BLOOD PRESSURE: 76 MMHG

## 2024-03-28 DIAGNOSIS — I10 ESSENTIAL HYPERTENSION: ICD-10-CM

## 2024-03-28 DIAGNOSIS — E66.01 MORBID OBESITY DUE TO EXCESS CALORIES (HCC): Primary | ICD-10-CM

## 2024-03-28 DIAGNOSIS — E11.9 NEW ONSET TYPE 2 DIABETES MELLITUS (HCC): ICD-10-CM

## 2024-03-28 DIAGNOSIS — F41.9 ANXIETY: ICD-10-CM

## 2024-03-28 DIAGNOSIS — Z86.718 HISTORY OF DVT (DEEP VEIN THROMBOSIS): ICD-10-CM

## 2024-03-28 DIAGNOSIS — S52.91XD: ICD-10-CM

## 2024-03-28 DIAGNOSIS — I10 PRIMARY HYPERTENSION: ICD-10-CM

## 2024-03-28 PROCEDURE — 3044F HG A1C LEVEL LT 7.0%: CPT | Performed by: NURSE PRACTITIONER

## 2024-03-28 PROCEDURE — 2022F DILAT RTA XM EVC RTNOPTHY: CPT | Performed by: NURSE PRACTITIONER

## 2024-03-28 PROCEDURE — G8427 DOCREV CUR MEDS BY ELIG CLIN: HCPCS | Performed by: NURSE PRACTITIONER

## 2024-03-28 PROCEDURE — 3075F SYST BP GE 130 - 139MM HG: CPT | Performed by: NURSE PRACTITIONER

## 2024-03-28 PROCEDURE — 99215 OFFICE O/P EST HI 40 MIN: CPT | Performed by: NURSE PRACTITIONER

## 2024-03-28 PROCEDURE — 3078F DIAST BP <80 MM HG: CPT | Performed by: NURSE PRACTITIONER

## 2024-03-28 PROCEDURE — G8417 CALC BMI ABV UP PARAM F/U: HCPCS | Performed by: NURSE PRACTITIONER

## 2024-03-28 PROCEDURE — 1036F TOBACCO NON-USER: CPT | Performed by: NURSE PRACTITIONER

## 2024-03-28 PROCEDURE — G8484 FLU IMMUNIZE NO ADMIN: HCPCS | Performed by: NURSE PRACTITIONER

## 2024-03-28 RX ORDER — BLOOD-GLUCOSE METER
1 KIT MISCELLANEOUS DAILY
Qty: 1 KIT | Refills: 0 | Status: SHIPPED | OUTPATIENT
Start: 2024-03-28

## 2024-03-28 RX ORDER — GLUCOSAMINE HCL/CHONDROITIN SU 500-400 MG
CAPSULE ORAL
Qty: 100 STRIP | Refills: 4 | Status: SHIPPED | OUTPATIENT
Start: 2024-03-28

## 2024-03-28 RX ORDER — LANCETS 30 GAUGE
1 EACH MISCELLANEOUS DAILY
Qty: 100 EACH | Refills: 5 | Status: SHIPPED | OUTPATIENT
Start: 2024-03-28

## 2024-03-28 RX ORDER — BLOOD SUGAR DIAGNOSTIC
1 STRIP MISCELLANEOUS DAILY
Qty: 100 EACH | Refills: 3 | Status: SHIPPED | OUTPATIENT
Start: 2024-03-28

## 2024-03-28 RX ORDER — LISINOPRIL 10 MG/1
10 TABLET ORAL DAILY
Qty: 90 TABLET | Refills: 4 | Status: SHIPPED | OUTPATIENT
Start: 2024-03-28

## 2024-03-28 RX ORDER — BUPROPION HYDROCHLORIDE 300 MG/1
300 TABLET ORAL EVERY MORNING
Qty: 90 TABLET | Refills: 1 | Status: SHIPPED | OUTPATIENT
Start: 2024-03-28

## 2024-03-28 ASSESSMENT — ENCOUNTER SYMPTOMS: RESPIRATORY NEGATIVE: 1

## 2024-03-28 NOTE — PROGRESS NOTES
[facility] PROFESSIONAL [facility] FAMILY MEDICINE  [address]  [place] [address]  Dept: [phone]  Dept Fax: [phone]  Loc: [phone]    Shivani Osorio is a 43 y.o. femalewho presents today for her medical conditions/complaints as noted below.  Shivani [place] c/o of Follow-up (No concerns )      :     HPI Pt here for 1 month f/u on weight     PMH; hepatic steatosis, transaminitis, insulin resistance, T2DM, HTN HX DVT Hx PE,     Fx her right arm 2 weeks ago has not been able to be physically active.    Pt here to discus weight loss medications. Pt recently went to ER with lumbar pain radiating from the right side to her RUQ.  Took prednisone and muscle relaxers and pain is better Also using topical gel which also helps      Noted to have normal u/s of gallbladder, had CT of abdomen that identified fatty liver and hepatomegaly    Had elevated LFT's, they have since returned to normal.     Pt also working  with Neuro for cervical spinal stenosis    Does have a GI provider and has seen them for hepatomegaly, will  refer back to them and order hepatitis panel to r/o hepatitis causing transaminitis    Pt also admits to right sided lumbar pain radiating to RUQ  Has had for 2-3 weeks  Tender to touch  No known injuries  Hurts with movement  Pain does not radiate down her legs denies N/T of legs  René Is intermittent  Taking motrin mild relief.  Also following with [facility] for cervical and thoracic pain has had imaging of c -spine and thoracic spine  Does have multiple bone spurs in neck      Wants to lose weight  Does walk a few times a week for 20-30 minutes  hard to exercise with pain  Does not count calories  Does not drink pop or eat sugary foods  Does snack at nighttime.  Started wellbutrin 1 month ago and has lost 5 pounds    Has been cutting out carbs and sugars    Dx with new onset T2DM   after her last visit started metformin 500 mg bid tolerating it well.  Needs supplies

## 2024-03-29 ENCOUNTER — HOSPITAL ENCOUNTER (OUTPATIENT)
Dept: RADIOLOGY | Facility: CLINIC | Age: 44
Discharge: HOME | End: 2024-03-29
Payer: COMMERCIAL

## 2024-03-29 ENCOUNTER — OFFICE VISIT (OUTPATIENT)
Dept: ORTHOPEDIC SURGERY | Facility: CLINIC | Age: 44
End: 2024-03-29
Payer: COMMERCIAL

## 2024-03-29 DIAGNOSIS — Z87.81: ICD-10-CM

## 2024-03-29 PROCEDURE — 1036F TOBACCO NON-USER: CPT | Performed by: SPECIALIST

## 2024-03-29 PROCEDURE — 73070 X-RAY EXAM OF ELBOW: CPT | Mod: RT

## 2024-03-29 PROCEDURE — 73070 X-RAY EXAM OF ELBOW: CPT | Mod: RIGHT SIDE | Performed by: RADIOLOGY

## 2024-03-29 PROCEDURE — 99214 OFFICE O/P EST MOD 30 MIN: CPT | Performed by: SPECIALIST

## 2024-03-29 PROCEDURE — 76882 US LMTD JT/FCL EVL NVASC XTR: CPT | Performed by: SPECIALIST

## 2024-03-29 ASSESSMENT — PAIN - FUNCTIONAL ASSESSMENT: PAIN_FUNCTIONAL_ASSESSMENT: 0-10

## 2024-03-29 ASSESSMENT — PAIN DESCRIPTION - DESCRIPTORS: DESCRIPTORS: ACHING;SORE

## 2024-03-29 ASSESSMENT — PAIN SCALES - GENERAL: PAINLEVEL_OUTOF10: 3

## 2024-03-29 NOTE — ASSESSMENT & PLAN NOTE
Assessment: Right elbow radial head fracture marginal with minimal displacement 2 weeks status post 3/13/2024 injury.    Plan:  Continue to work on gentle range of motion of the elbow.  She does not require further pain medication.  Use the sling when she is not doing her range of motion exercises.  Follow-up in 2 weeks for reevaluation with new x-rays of the right elbow.  She still lacking supination and needs to work on this.

## 2024-03-29 NOTE — PROGRESS NOTES
Assessment/Plan   Encounter Diagnoses:  Hx of fracture of arm  Disp fx of head of right radius, init for clos fx  Assessment: Right elbow radial head fracture marginal with minimal displacement 2 weeks status post 3/13/2024 injury.    Plan:  Continue to work on gentle range of motion of the elbow.  She does not require further pain medication.  Use the sling when she is not doing her range of motion exercises.  Follow-up in 2 weeks for reevaluation with new x-rays of the right elbow.  She still lacking supination and needs to work on this.       Subjective    Patient ID: Siobhan Brooks is a 43 y.o. female.    Chief Complaint: Follow-up of the Right Elbow     Last Surgery: No surgery found  Last Surgery Date: No surgery found    HPI  Status post a 3/13/2024 injury where she was bringing a dog inside when she tripped and fell.  She is not complaining of any other injury.  She understands the risks and benefits of continued healing.  She may end up with a radial head excision or an open reduction internal fixation of the fracture fragment.  I am still fairly confident she will achieve increased supination and there is no bony block to motion at this point.    OBJECTIVE: ORTHO EXAM  Right elbow:    Skin healthy and intact  Moderate swelling with resolving  ecchymosis     Moderate tenderness to palpation over the olecranon or radial head  Mild tenderness to palpation over the lateral epicondyle  Minimal tenderness to palpation over the medial epicondyle  Full flexion to 110, extension to 40 degrees, pronation to 70 degrees/supination to about 10 degrees and then limited by pain  Mild pain with resisted wrist extension mild pain with resisted wrist flexion   Negative hook test  Neurovascular exam normal distally    IMAGE RESULTS:  XR elbow right 1-2 views  Narrative: Interpreted By:  Schoenberger, Joseph,   STUDY:  XR ELBOW RIGHT 1-2 VIEWS; ;  3/19/2024 2:34 pm      INDICATION:  Signs/Symptoms:S/P FRACTURE.       COMPARISON:  03/13/2024      ACCESSION NUMBER(S):  [id number]      ORDERING CLINICIAN:  CLAUDIA DAVENPORT      FINDINGS:  Intra-articular fracture of the radius head again noted. Persistent  joint effusion. No new fractures are seen.      Impression: Similar appearance to intra-articular radial head fracture with joint  effusion as detailed above          MACRO:  None      Signed by: Joseph Schoenberger 3/21/2024 9:59 AM  Dictation workstation:   [id number]      ULTRASOUND  Site: Right elbow  Indication: ELBOW PAIN  Technique: B-Mode Ultrasound Examination performed using 8-13 MHz linear transducer with [employer]  STUDY TYPE:   1. ULTRASOUND EXTREMITY INCLUDING BUT NOT LIMITED TO ELBOW MUSCLE, TENDONS, LIGAMENTS, FATTY TISSUES, SUBCUTANEOUS TISSUES AND OTHER SOFT TISSUE STRUCTURES SUCH AS ABSCESSES OR FREE FLUID ACCUMULATION WITHIN THE PRIMARY JOINT AS WELL AS ADJACENT JOINTS.  2. REAL TIME WITH IMAGE DOCUMENTATION  3. NON-VASCULAR  4. COMPLETE STUDY WHICH INCLUDES A THOROUGH EVALUATION OF THE ELBOW MUSCLE, TENDONS, LIGAMENTS, FATTY TISSUES, SUBCUTANEOUS TISSUES AND OTHER SOFT TISSUE STRUCTURES SUCH AS ABSCESSES OR FREE FLUID ACCUMULATION WITHIN THE PRIMARY JOINT AS WELL AS ADJACENT JOINTS.  Live ultrasound was performed of the patient´s ELBOW and PERMANENTLY documented.  This is a thorough and complete evaluation of a specific anatomic region specifically the  ELBOW.  PERMANENT Image documentation was performed.  This is the complete and final ultrasound report of the patient's ELBOW.  The patient was positioned in order to optimize the ultrasound evaluation of the  ELBOW.  Ultrasound gel was used as a conductive medium in order to both transmit and receive ultrasonic signals that characterize the soft tissues. . I personally performed the ultrasound and reviewed the findings. These show:  Findings:  ELBOW  Bonnie-articular evaluation: Live ultrasound was performed of the patient's  ELBOW that shows a [normal  and intact] appearance of the biceps/brachialis tendon. The triceps tendon shows a [normal and intact] appearance. The  lateral epicondylar areas and  tendon attachments show [normal] sonographic configuration.  The  medial epicondylar areas and  tendon attachments show [normal] sonographic configuration.  The olecranon bursa appears [normal].  Joint Evaluation: The radial head and neck show [no] bony abnormality or fracture.  The olecranon shows [no] bony abnormality or fracture. [Mild joint effusion is seen.  A fracture of the radial head neck junction is seen.  The radial head neck is rotating with pronation supination.  As 1 unit    Procedures     Orders Placed This Encounter    XR elbow right 1-2 views    Point of Care Ultrasound

## 2024-04-12 ENCOUNTER — HOSPITAL ENCOUNTER (OUTPATIENT)
Dept: RADIOLOGY | Facility: CLINIC | Age: 44
Discharge: HOME | End: 2024-04-12
Payer: COMMERCIAL

## 2024-04-12 ENCOUNTER — OFFICE VISIT (OUTPATIENT)
Dept: ORTHOPEDIC SURGERY | Facility: CLINIC | Age: 44
End: 2024-04-12
Payer: COMMERCIAL

## 2024-04-12 ENCOUNTER — HOSPITAL ENCOUNTER (OUTPATIENT)
Dept: RADIOLOGY | Facility: EXTERNAL LOCATION | Age: 44
Discharge: HOME | End: 2024-04-12

## 2024-04-12 DIAGNOSIS — S52.121A DISP FX OF HEAD OF RIGHT RADIUS, INIT FOR CLOS FX: Primary | ICD-10-CM

## 2024-04-12 DIAGNOSIS — M25.521 RIGHT ELBOW PAIN: ICD-10-CM

## 2024-04-12 PROCEDURE — 99214 OFFICE O/P EST MOD 30 MIN: CPT | Performed by: SPECIALIST

## 2024-04-12 PROCEDURE — 73080 X-RAY EXAM OF ELBOW: CPT | Mod: RIGHT SIDE | Performed by: STUDENT IN AN ORGANIZED HEALTH CARE EDUCATION/TRAINING PROGRAM

## 2024-04-12 PROCEDURE — 1036F TOBACCO NON-USER: CPT | Performed by: SPECIALIST

## 2024-04-12 PROCEDURE — 73080 X-RAY EXAM OF ELBOW: CPT | Mod: RT

## 2024-04-12 ASSESSMENT — PAIN SCALES - GENERAL: PAINLEVEL_OUTOF10: 3

## 2024-04-12 ASSESSMENT — PAIN - FUNCTIONAL ASSESSMENT: PAIN_FUNCTIONAL_ASSESSMENT: 0-10

## 2024-04-12 ASSESSMENT — PAIN DESCRIPTION - DESCRIPTORS: DESCRIPTORS: DULL;SHARP

## 2024-04-12 NOTE — ASSESSMENT & PLAN NOTE
Assessment: Right elbow radial head fracture marginal with minimal displacement 4 weeks status post 3/13/2024 injury.    Plan:  Continue to work on gentle range of motion of the elbow.  Offered OT but she deferred if she is not making good progress will get OT at her next visit.  She does not require further pain medication.  Use the sling on a as needed basis   follow-up in 2 weeks for reevaluation with new x-rays of the right elbow.

## 2024-04-12 NOTE — PROGRESS NOTES
Assessment/Plan   Encounter Diagnoses:  Disp fx of head of right radius, init for clos fx    Right elbow pain  Disp fx of head of right radius, init for clos fx  Assessment: Right elbow radial head fracture marginal with minimal displacement 4 weeks status post 3/13/2024 injury.    Plan:  Continue to work on gentle range of motion of the elbow.  Offered OT but she deferred if she is not making good progress will get OT at her next visit.  She does not require further pain medication.  Use the sling on a as needed basis   follow-up in 2 weeks for reevaluation with new x-rays of the right elbow.         Subjective    Patient ID: Siobhan Brooks is a 43 y.o. female.    Chief Complaint: Follow-up of the Right Elbow (Fracured on 3-13-24)     Last Surgery: No surgery found  Last Surgery Date: No surgery found    HPI  43-year-old who is 1 month status post a radial head fracture.  She comes in today stating she is no longer using narcotic pain medications.  She is working on her range of motion and has made good progress.  She still lacks supination and extension and some flexion.  She is neurovascularly intact distally.    OBJECTIVE: ORTHO EXAM  Right elbow:    With varus and valgus stress at the elbow she has stability with minimal pain.  She lacks 25 degrees of extension.  She gets 70 degrees of pronation.  She actively supinates to 25 degrees and with some passive assistance gets to 45 to 50 degrees.  She flexes to 110 degrees and with some passive assistance to 125.  Her endpoints of range of motion are all rubbery in the end feel   she is tender over the radial head but this is now mild  She denies any wrist pain and has full range of motion of her wrist and hand.    IMAGE RESULTS:  Point of Care Ultrasound  These images are not reportable by radiology and will not be interpreted   by  Radiologists.    Plain x-rays show a comminuted intra-articular radial head fracture.  There is at least 3 main fracture  fragments.  1 is completely displaced anteriorly.  ULTRASOUND  none    Procedures     Orders Placed This Encounter    XR elbow right 3+ views    Point of Care Ultrasound

## 2024-04-25 ENCOUNTER — HOSPITAL ENCOUNTER (OUTPATIENT)
Dept: RADIOLOGY | Facility: EXTERNAL LOCATION | Age: 44
Discharge: HOME | End: 2024-04-25

## 2024-04-25 ENCOUNTER — OFFICE VISIT (OUTPATIENT)
Dept: ORTHOPEDIC SURGERY | Facility: CLINIC | Age: 44
End: 2024-04-25
Payer: COMMERCIAL

## 2024-04-25 ENCOUNTER — HOSPITAL ENCOUNTER (OUTPATIENT)
Dept: RADIOLOGY | Facility: CLINIC | Age: 44
Discharge: HOME | End: 2024-04-25
Payer: COMMERCIAL

## 2024-04-25 DIAGNOSIS — S52.121A DISP FX OF HEAD OF RIGHT RADIUS, INIT FOR CLOS FX: ICD-10-CM

## 2024-04-25 PROCEDURE — 1036F TOBACCO NON-USER: CPT | Performed by: SPECIALIST

## 2024-04-25 PROCEDURE — 73080 X-RAY EXAM OF ELBOW: CPT | Mod: RT

## 2024-04-25 PROCEDURE — 73080 X-RAY EXAM OF ELBOW: CPT | Mod: RIGHT SIDE | Performed by: RADIOLOGY

## 2024-04-25 PROCEDURE — 99214 OFFICE O/P EST MOD 30 MIN: CPT | Performed by: SPECIALIST

## 2024-04-25 ASSESSMENT — PAIN DESCRIPTION - DESCRIPTORS: DESCRIPTORS: ACHING;DULL

## 2024-04-25 ASSESSMENT — PAIN - FUNCTIONAL ASSESSMENT: PAIN_FUNCTIONAL_ASSESSMENT: 0-10

## 2024-04-25 ASSESSMENT — PAIN SCALES - GENERAL: PAINLEVEL_OUTOF10: 2

## 2024-04-25 NOTE — PROGRESS NOTES
Assessment/Plan   Encounter Diagnoses:  Disp fx of head of right radius, init for clos fx  Disp fx of head of right radius, init for clos fx  Assessment: Right elbow radial head fracture marginal with minimal displacement and an extruded fragment anteriorly now 5 weeks status post 3/13/2024 injury.    Plan:  Continue to work on  range of motion of the elbow.   She is not making sufficient progress wrt ROM.  Will start formal OT.    Discussed surgical options again with her.  If no significant progress she may require radial head resection and +/- arthroplasty.  She does not require further pain medication.  Use the sling on a as needed basis   follow-up in 2 weeks for reevaluation with new x-rays of the right elbow.   Spoke with pt about obtaining a CT scan of the scan of her elbow.  I will refer her to Dr. Caden Cardoza for further consultation and treatment.  I left a message on her phone regarding this.      Subjective    Patient ID: Siobhan Brooks is a 43 y.o. female.    Chief Complaint: Fracture of the Right Elbow (DATE OF INJURY 03/13/2024)     Last Surgery: No surgery found  Last Surgery Date: No surgery found    HPI  43-year-old status post a comminuted right radial head fracture.  She has some marginal minimally displaced fracture fragment and an anterior completely displaced fragment.  Her range of motion is diminished and she has not made significant progress since her last visit.  Her pain level is improved.  She is not using her sling on a full-time basis.  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    OBJECTIVE: ORTHO EXAM  Right elbow:     With varus and valgus stress at the elbow she has stability with minimal pain.  She lacks 25- 30 degrees of extension.  She gets 70 degrees of pronation.  She actively supinates to 25 degrees and with some passive assistance gets to 45 to 50 degrees but this is painful.  She flexes to 110  degrees and with some passive assistance to 125. This is unchanged although she states she has started using her UE for eating so she does have some functional improvement in her ROM.  Her endpoints of range of motion are still rubbery but this is diminishing.  she is tender over the radial head but this is now min to mild  She denies any wrist pain and has full range of motion of her wrist and hand.    IMAGE RESULTS:  Point of Care Ultrasound  These images are not reportable by radiology and will not be interpreted   by  Radiologists.            Procedures     Orders Placed This Encounter    XR elbow right 3+ views    Point of Care Ultrasound    XR elbow left 3+ views    Referral to Occupational Therapy

## 2024-04-25 NOTE — ASSESSMENT & PLAN NOTE
Assessment: Right elbow radial head fracture marginal with minimal displacement and an extruded fragment anteriorly now 5 weeks status post 3/13/2024 injury.    Plan:  Continue to work on  range of motion of the elbow.   She is not making sufficient progress wrt ROM.  Will start formal OT.    Discussed surgical options again with her.  If no significant progress she may require radial head resection and +/- arthroplasty.  She does not require further pain medication.  Use the sling on a as needed basis   follow-up in 2 weeks for reevaluation with new x-rays of the right elbow.

## 2024-05-07 ENCOUNTER — HOSPITAL ENCOUNTER (OUTPATIENT)
Dept: RADIOLOGY | Facility: HOSPITAL | Age: 44
Discharge: HOME | End: 2024-05-07
Payer: COMMERCIAL

## 2024-05-07 DIAGNOSIS — S52.121A DISP FX OF HEAD OF RIGHT RADIUS, INIT FOR CLOS FX: ICD-10-CM

## 2024-05-07 PROCEDURE — 73200 CT UPPER EXTREMITY W/O DYE: CPT | Mod: RIGHT SIDE | Performed by: RADIOLOGY

## 2024-05-07 PROCEDURE — 73200 CT UPPER EXTREMITY W/O DYE: CPT | Mod: RT

## 2024-05-09 ENCOUNTER — OFFICE VISIT (OUTPATIENT)
Dept: ORTHOPEDIC SURGERY | Facility: CLINIC | Age: 44
End: 2024-05-09
Payer: COMMERCIAL

## 2024-05-09 ENCOUNTER — HOSPITAL ENCOUNTER (OUTPATIENT)
Dept: RADIOLOGY | Facility: CLINIC | Age: 44
Discharge: HOME | End: 2024-05-09
Payer: COMMERCIAL

## 2024-05-09 ENCOUNTER — HOSPITAL ENCOUNTER (OUTPATIENT)
Dept: RADIOLOGY | Facility: EXTERNAL LOCATION | Age: 44
Discharge: HOME | End: 2024-05-09

## 2024-05-09 DIAGNOSIS — S52.121A DISP FX OF HEAD OF RIGHT RADIUS, INIT FOR CLOS FX: ICD-10-CM

## 2024-05-09 PROCEDURE — 73080 X-RAY EXAM OF ELBOW: CPT | Mod: RT

## 2024-05-09 PROCEDURE — 73080 X-RAY EXAM OF ELBOW: CPT | Mod: RIGHT SIDE | Performed by: RADIOLOGY

## 2024-05-09 PROCEDURE — 99214 OFFICE O/P EST MOD 30 MIN: CPT | Performed by: SPECIALIST

## 2024-05-09 PROCEDURE — 1036F TOBACCO NON-USER: CPT | Performed by: SPECIALIST

## 2024-05-09 ASSESSMENT — PAIN SCALES - GENERAL: PAINLEVEL_OUTOF10: 7

## 2024-05-09 ASSESSMENT — PAIN - FUNCTIONAL ASSESSMENT: PAIN_FUNCTIONAL_ASSESSMENT: 0-10

## 2024-05-09 ASSESSMENT — PAIN DESCRIPTION - DESCRIPTORS: DESCRIPTORS: ACHING;DULL

## 2024-05-09 NOTE — PROGRESS NOTES
Assessment/Plan   Encounter Diagnoses:  Disp fx of head of right radius, init for clos fx  Disp fx of head of right radius, init for clos fx  Assessment: Radial head fracture with a displaced fragment.  She is making slow progress with respect to range of motion.  Her pain level has improved but is still limiting.  A CT scan was obtained which showed a nondisplaced coronoid process fracture and an impaction fracture of the capitellum along with the comminuted radial head fracture with multiple fragments including 1 that is displaced anteriorly and somewhat distal.    Plan:  I had discussed her fracture with Dr. Caden Cardoza.  I would like her to obtain a second opinion with him.  She can follow-up after the second opinion with Dr. Cardoza.  Continue to do her gentle range of motion and strengthening.  She does seem to have gained some range of motion since her last visit.  Coincidentally her mother had a radial head fracture which was treated without surgery and she is doing quite well.   Spoke with pt about obtaining a CT scan of the scan of her elbow.  I will refer her to Dr. Caden Cardoza for further consultation and treatment.        Subjective    Patient ID: Siobhan Brooks is a 43 y.o. female.    Chief Complaint: Follow-up of the Right Elbow     Last Surgery: No surgery found  Last Surgery Date: No surgery found    HPI  43-year-old status post a comminuted right radial head fracture.  She has some marginal minimally displaced fracture fragment and an anterior completely displaced fragment.  Her range of motion is diminished and she has not made significant progress since her last visit.  Her pain level is improved.  She is not using her sling on a full-time basis.  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    5/9/24-slow progress status post a comminuted radial head fracture.  CT scan was obtained and showed the fracture along with  an impaction fracture of the capitellum and a nondisplaced coronoid fracture.  OBJECTIVE: ORTHO EXAM  Right elbow:     With varus and valgus stress at the elbow she has stability with minimal pain.  She lacks 30 degrees of extension.  She gets 70 degrees of pronation.  She actively supinates to 35 degrees and with some passive assistance gets to 55 to 60 degrees but this is painful.  She flexes to 110 degrees and with some passive assistance to 125. This is somewhat improved and that she can reach her lips and mouth with the thumb to about the IP joint.  Her endpoints of range of motion are still rubbery but this is diminishing.  she is tender over the radial head but this is now min to mild she is also complaining of some medial pain today.  She denies any wrist pain and has full range of motion of her wrist and hand.    IMAGE RESULTS:  Point of Care Ultrasound  These images are not reportable by radiology and will not be interpreted   by  Radiologists.            Procedures     Orders Placed This Encounter    XR elbow right 3+ views    Point of Care Ultrasound

## 2024-05-09 NOTE — ASSESSMENT & PLAN NOTE
Assessment: Radial head fracture with a displaced fragment.  She is making slow progress with respect to range of motion.  Her pain level has improved but is still limiting.  A CT scan was obtained which showed a nondisplaced coronoid process fracture and an impaction fracture of the capitellum along with the comminuted radial head fracture with multiple fragments including 1 that is displaced anteriorly and somewhat distal.    Plan:  I had discussed her fracture with Dr. Caden Cardoza.  I would like her to obtain a second opinion with him.  She can follow-up after the second opinion with Dr. Cardoza.  Continue to do her gentle range of motion and strengthening.  She does seem to have gained some range of motion since her last visit.  Coincidentally her mother had a radial head fracture which was treated without surgery and she is doing quite well.

## 2024-05-13 DIAGNOSIS — R23.2 FACIAL FLUSHING: ICD-10-CM

## 2024-05-14 RX ORDER — AZELAIC ACID 0.15 G/G
GEL TOPICAL
Qty: 50 G | Refills: 3 | Status: SHIPPED | OUTPATIENT
Start: 2024-05-14

## 2024-05-14 NOTE — TELEPHONE ENCOUNTER
Future Appointments   Date Time Provider Department Center   7/1/2024  8:20 AM Arcadio Chowdhury, REECE - CNP [facility] [place]

## 2024-05-24 ENCOUNTER — HOSPITAL ENCOUNTER (OUTPATIENT)
Dept: RADIOLOGY | Facility: HOSPITAL | Age: 44
Discharge: HOME | End: 2024-05-24
Payer: COMMERCIAL

## 2024-05-24 ENCOUNTER — OFFICE VISIT (OUTPATIENT)
Dept: ORTHOPEDIC SURGERY | Facility: HOSPITAL | Age: 44
End: 2024-05-24
Payer: COMMERCIAL

## 2024-05-24 VITALS — WEIGHT: 235 LBS | BODY MASS INDEX: 41.64 KG/M2 | HEIGHT: 63 IN

## 2024-05-24 DIAGNOSIS — S52.121A DISP FX OF HEAD OF RIGHT RADIUS, INIT FOR CLOS FX: Primary | ICD-10-CM

## 2024-05-24 DIAGNOSIS — S52.121A DISP FX OF HEAD OF RIGHT RADIUS, INIT FOR CLOS FX: ICD-10-CM

## 2024-05-24 DIAGNOSIS — S02.630A: ICD-10-CM

## 2024-05-24 DIAGNOSIS — M12.521 TRAUMATIC ARTHRITIS ELBOW, RIGHT: ICD-10-CM

## 2024-05-24 PROCEDURE — 99204 OFFICE O/P NEW MOD 45 MIN: CPT | Performed by: ORTHOPAEDIC SURGERY

## 2024-05-24 PROCEDURE — 73070 X-RAY EXAM OF ELBOW: CPT | Mod: RT

## 2024-05-24 PROCEDURE — 99214 OFFICE O/P EST MOD 30 MIN: CPT | Performed by: ORTHOPAEDIC SURGERY

## 2024-05-24 PROCEDURE — 73070 X-RAY EXAM OF ELBOW: CPT | Mod: RIGHT SIDE | Performed by: STUDENT IN AN ORGANIZED HEALTH CARE EDUCATION/TRAINING PROGRAM

## 2024-05-24 RX ORDER — SODIUM CHLORIDE 9 MG/ML
100 INJECTION, SOLUTION INTRAVENOUS CONTINUOUS
OUTPATIENT
Start: 2024-05-24

## 2024-05-24 RX ORDER — CEFAZOLIN SODIUM 2 G/100ML
2 INJECTION, SOLUTION INTRAVENOUS ONCE
Status: CANCELLED | OUTPATIENT
Start: 2024-05-24 | End: 2024-05-24

## 2024-05-24 ASSESSMENT — PAIN DESCRIPTION - DESCRIPTORS: DESCRIPTORS: ACHING;DULL

## 2024-05-24 ASSESSMENT — PAIN - FUNCTIONAL ASSESSMENT: PAIN_FUNCTIONAL_ASSESSMENT: 0-10

## 2024-05-24 ASSESSMENT — PAIN SCALES - GENERAL: PAINLEVEL_OUTOF10: 3

## 2024-05-29 NOTE — PROGRESS NOTES
This is a 43-year-old female presenting for evaluation of her right elbow.  This is a consultation from Dr. Mahmood.  She sustained a terrible triad type injury to her right elbow with radial head fracture and coronoid tip process injury as well.  She was attempted be treated conservatively and has gone on to continue dysfunction disability her right elbow with some posttraumatic arthritis.  She presents for definitive management.  She really has no meaningful function of her elbow and daily pain.  She has stiffness with range of motion and lack of function due to pain.    The patients full medical history, surgical history, medications, allergies, family, medical history, social history, and a complete 30 point review of systems is documented in the medical record on the signed, scanned medical intake sheet or reviewed in the history of present illness.    Gen: The patient is alert and oriented ×3, is in no acute distress, and appear their stated age and weight.    Psychiatric: Mood and affect are appropriate.    Eyes: Sclera are white, and pupils are round and symmetric.    ENT: Mucous membranes are moist.     Neck: Supple. Thyroid is midline.    Respiratory: Respirations are nonlabored, chest rise is symmetric.    Cardiac: Rate is regular by palpation of distal pulses.     Abdomen: Nondistended.    Integument: No obvious cutaneous lesions are noted. No signs of lymphangitis. No signs of systemic edema.    Musculoskeletal examination of the right upper extremity demonstrate pain to palpation of the lateral aspect of the elbow.  There is pain and crepitus with passive and active range of motion which is severely limited due to pain.  Sensation is intact to light touch in the axillary, median, ulnar, and radial nerve distributions distally. The hand is warm and well-perfused.    Multiple views including CT scan of her right elbow demonstrate a large radial head fragment with posttraumatic arthritis seen on the  capitellum.  There is a coronoid process fracture as well however it is very challenging to know if it is partially healed.  If it is in fact partially healed this may be able to be left alone and just be treated with a radial head arthroplasty and possible microfracture of the capitellum but I did discuss with her the opportunity to repair the anterior capsule if the coronoid was unstable.    43-year-old female with a terrible triad injury now also with some posttraumatic capitellar arthritis and a largely unstable radial head fragment.  I recommended radial head arthroplasty with possible coronoid repair or anterior capsule repair as mentioned above.  Also she will require lateral ligament repair for stability.  I also think that she has significant posttraumatic arthritis of the capitellum that may require microfracture drilling.  All of this was discussed in detail as well as risk and benefits with the patient.  Plan for surgery 6/10 at [facility].    Natural History reviewed. All questions answered. The patient was in agreement with the plan.      **This note was created using voice recognition software and was not corrected for typographical or grammatical errors.**

## 2024-06-05 ENCOUNTER — CLINICAL SUPPORT (OUTPATIENT)
Dept: PREADMISSION TESTING | Facility: HOSPITAL | Age: 44
End: 2024-06-05
Payer: COMMERCIAL

## 2024-06-05 DIAGNOSIS — S52.121A DISP FX OF HEAD OF RIGHT RADIUS, INIT FOR CLOS FX: Primary | ICD-10-CM

## 2024-06-05 DIAGNOSIS — S52.121A DISP FX OF HEAD OF RIGHT RADIUS, INIT FOR CLOS FX: ICD-10-CM

## 2024-06-05 DIAGNOSIS — M12.521 TRAUMATIC ARTHRITIS ELBOW, RIGHT: ICD-10-CM

## 2024-06-05 DIAGNOSIS — S02.630A: ICD-10-CM

## 2024-06-05 RX ORDER — TIRZEPATIDE 2.5 MG/.5ML
2.5 INJECTION, SOLUTION SUBCUTANEOUS
COMMUNITY

## 2024-06-05 RX ORDER — BUPROPION HYDROCHLORIDE 300 MG/1
300 TABLET ORAL EVERY MORNING
COMMUNITY

## 2024-06-05 RX ORDER — OXYCODONE AND ACETAMINOPHEN 5; 325 MG/1; MG/1
1 TABLET ORAL EVERY 6 HOURS PRN
Status: ON HOLD | COMMUNITY
End: 2024-06-10

## 2024-06-05 NOTE — PREPROCEDURE INSTRUCTIONS
Current Medications   Medication Instructions    amitriptyline (Elavil) 25 mg tablet Continue until night before surgery    aspirin-acetaminophen-caffeine (Excedrin Migraine) 250-250-65 mg tablet Stop 7 days before surgery    buPROPion XL (Wellbutrin XL) 300 mg 24 hr tablet Take morning of surgery with sip of water, no other fluids    DULoxetine (Cymbalta) 30 mg DR capsule Take morning of surgery with sip of water, no other fluids    ERGOCALCIFEROL, VITAMIN D2, ORAL Stop 7 days before surgery    ibuprofen 600 mg tablet Stop 7 days before surgery    lisinopril 10 mg tablet Stop 1 day before surgery    metFORMIN (Glucophage) 500 mg tablet Continue until night before surgery    pregabalin (Lyrica) 50 mg capsule Take morning of surgery with sip of water, no other fluids    tirzepatide (Mounjaro) 2.5 mg/0.5 mL pen injector Stop 7 days before surgery                       NPO Instructions:    Do not eat any food after midnight the night before your surgery/procedure.    Additional Instructions:     Seven/Six Days before Surgery:  Review your medication instructions, stop indicated medications  Five Days before Surgery:  Review your medication instructions, stop indicated medications  Three Days before Surgery:  Review your medication instructions, stop indicated medications  The Day before Surgery:  Review your medication instructions, stop indicated medications  You will be contacted regarding the time of your arrival to facility and surgery time  Do not eat any food after Midnight  Day of Surgery:  Review your medication instructions, take indicated medications  If you have diabetes, please check your fasting blood sugar upon awakening.  If fasting blood sugar is <80 mg/dl, drink 100 ml of apple juice, time limit of 2 hours before  Wear  comfortable loose fitting clothing  Do not use moisturizers, creams, lotions or perfume  All jewelry and valuables should be left at home    PAT PRE-OPERATIVE INSTRUCTIONS    [facility]  [address] [place] [address]  [phone]    Please let your surgeon know if:      You develop:  Open sores, shingles, burning or painful urination as these may increase your risk of an infection.   Fever=100.4 or greater   New or worsening cold or flu symptoms ( cough, shortness of breath, sore throat, respiratory distress, headache, fatigue, GI symptoms)   You no longer wish to have the surgery.   Any other personal circumstances change that may lead to the need to cancel or defer this surgery-such as being sick or getting admitted to any hospital within one week of your planned procedure.    Your contact details change, such as a change of address or phone number.    Starting now:     Please DO NOT drink alcohol or smoke for 24 hours before surgery. It is well known that quitting smoking can make a huge difference to your health and recovery from surgery. The longer you abstain from smoking, the better your chances of a healthy recovery. If you need help with quitting, call 5-800QUIT-NOW to be connected to a trained counselor who will discuss the best methods to help you quit.     Before your surgery:    Please stop all supplements/ vitamins 7 days prior to surgery (or as directed by your surgeon).   Please stop taking NSAID pain medicine such as Advil, Ibuprofen, and Motrin 7 days before surgery.    If you develop any fever, cough, cold, rashes, cuts, scratches, scrapes, urinary symptoms or infection anywhere on your body (including teeth and gums) prior to surgery, please call your surgeon’s office as soon as possible. This may require treatment to reduce the chance of cancellation on the day of surgery.    The day before your surgery:   DIET- Do not eat any food after MIDNIGHT.   Get a good night’s rest.  Use the special soap for bathing if you have been instructed to use one.    Scheduled surgery times may change and you will be notified if this occurs -  please check your personal voicemail for any updates.     On the morning of surgery:   Wear comfortable, loose fitting clothes which open in the front.   Shower and please do not wear moisturizers, creams, lotions, deodorants, makeup or perfume.    Please bring with you to surgery:   Photo ID and insurance card   Current list of medicines and allergies   Pacemaker/ Defibrillator/Heart stent cards as well as remote controls for implanted devices    CPAP machine and mask    Slings/ splints/ crutches   A copy of your complete advanced directive/DHPOA.    Please do NOT bring with you to surgery:   All jewelry and valuables should be left at home.   Prosthetic devices such as contact lenses, glasses, hearing aids, dentures, eyelash extensions, hairpins and body piercings must be removed prior to going in to the surgical suite. If you have a case for these items, please bring it with you on surgery day.    *Patients under the age of 18: A responsible adult must be present and remaining in the building throughout the surgical visit.    After outpatient surgery:   A responsible adult MUST accompany you at the time of discharge and stay with you for 24 hours after your surgery. You may NOT drive yourself home after surgery.    Do not drive, operate machinery, make critical decisions or do activities that require co-ordination or balance until after a night’s sleep.   Do not drink alcoholic beverages for 24 hours.   Instructions for resuming your medications will be provided by your surgeon.    CALL YOUR DOCTOR AFTER SURGERY IF YOU HAVE:     Chills and/or a fever of 101° F or higher.    Redness, swelling, pus or drainage from your surgical wound or a bad smell from the wound.    Lightheadedness, fainting or confusion.    Persistent vomiting (throwing up) and are not able to eat or drink for 12 hours.    Three or more loose, watery bowel movements in 24 hours (diarrhea).   Difficulty or pain while urinating( after  non-urological surgery)    Pain and swelling in your legs, especially if it is only on one side.    Difficulty breathing or are breathing faster than normal.    Any new concerning symptoms.      Reviewed pre-op instructions with patient, states understanding and denies further questions at this time.      Take Care Siobhan!

## 2024-06-05 NOTE — PERIOPERATIVE NURSING NOTE
Dr. Gallagher said patient doesn't need to go in to PC to be seen if they will write a clearance note for the patient to have this procedure, that would be acceptable. Call to patient to notify of anesthesia's request for clearance note from PCP, Abbe Blas CNP, and she will call office to request this clearance note. PAT fax number provided.

## 2024-06-05 NOTE — PERIOPERATIVE NURSING NOTE
Secure chat message sent to Dr. Gallagher, Dr. Kaplan, and Dr. Cardoza. Notified of info collected during nurse screening call including hx, most recent labs/ EKG, BMI. Dr. Gallagher replied that she will order EKG for DOS and she would like PCP clearance.

## 2024-06-06 ENCOUNTER — TELEPHONE (OUTPATIENT)
Dept: FAMILY MEDICINE CLINIC | Age: 44
End: 2024-06-06

## 2024-06-06 ENCOUNTER — DOCUMENTATION (OUTPATIENT)
Dept: PREADMISSION TESTING | Facility: HOSPITAL | Age: 44
End: 2024-06-06
Payer: COMMERCIAL

## 2024-06-06 ENCOUNTER — OFFICE VISIT (OUTPATIENT)
Dept: FAMILY MEDICINE CLINIC | Age: 44
End: 2024-06-06
Payer: COMMERCIAL

## 2024-06-06 VITALS
HEART RATE: 104 BPM | RESPIRATION RATE: 16 BRPM | TEMPERATURE: 97.9 F | SYSTOLIC BLOOD PRESSURE: 134 MMHG | OXYGEN SATURATION: 96 % | BODY MASS INDEX: 42.84 KG/M2 | HEIGHT: 63 IN | DIASTOLIC BLOOD PRESSURE: 74 MMHG | WEIGHT: 241.8 LBS

## 2024-06-06 DIAGNOSIS — Z01.818 PREOPERATIVE CLEARANCE: Primary | ICD-10-CM

## 2024-06-06 DIAGNOSIS — E11.9 NEW ONSET TYPE 2 DIABETES MELLITUS (HCC): ICD-10-CM

## 2024-06-06 DIAGNOSIS — S52.121S CLOSED DISPLACED FRACTURE OF HEAD OF RIGHT RADIUS, SEQUELA: ICD-10-CM

## 2024-06-06 DIAGNOSIS — M79.601 RIGHT ARM PAIN: ICD-10-CM

## 2024-06-06 DIAGNOSIS — I10 PRIMARY HYPERTENSION: ICD-10-CM

## 2024-06-06 DIAGNOSIS — E88.819 INSULIN RESISTANCE: ICD-10-CM

## 2024-06-06 DIAGNOSIS — E66.01 MORBID OBESITY DUE TO EXCESS CALORIES (HCC): ICD-10-CM

## 2024-06-06 DIAGNOSIS — Z86.718 HISTORY OF DVT (DEEP VEIN THROMBOSIS): ICD-10-CM

## 2024-06-06 LAB — HBA1C MFR BLD: 6.1 % (ref 4.3–5.7)

## 2024-06-06 PROCEDURE — G8417 CALC BMI ABV UP PARAM F/U: HCPCS | Performed by: NURSE PRACTITIONER

## 2024-06-06 PROCEDURE — 3078F DIAST BP <80 MM HG: CPT | Performed by: NURSE PRACTITIONER

## 2024-06-06 PROCEDURE — G8427 DOCREV CUR MEDS BY ELIG CLIN: HCPCS | Performed by: NURSE PRACTITIONER

## 2024-06-06 PROCEDURE — 2022F DILAT RTA XM EVC RTNOPTHY: CPT | Performed by: NURSE PRACTITIONER

## 2024-06-06 PROCEDURE — 99215 OFFICE O/P EST HI 40 MIN: CPT | Performed by: NURSE PRACTITIONER

## 2024-06-06 PROCEDURE — 3075F SYST BP GE 130 - 139MM HG: CPT | Performed by: NURSE PRACTITIONER

## 2024-06-06 PROCEDURE — 1036F TOBACCO NON-USER: CPT | Performed by: NURSE PRACTITIONER

## 2024-06-06 PROCEDURE — 3044F HG A1C LEVEL LT 7.0%: CPT | Performed by: NURSE PRACTITIONER

## 2024-06-06 RX ORDER — TIRZEPATIDE 5 MG/.5ML
5 INJECTION, SOLUTION SUBCUTANEOUS WEEKLY
Qty: 4 ML | Refills: 3 | Status: SHIPPED | OUTPATIENT
Start: 2024-06-06

## 2024-06-06 SDOH — ECONOMIC STABILITY: FOOD INSECURITY: WITHIN THE PAST 12 MONTHS, THE FOOD YOU BOUGHT JUST DIDN'T LAST AND YOU DIDN'T HAVE MONEY TO GET MORE.: NEVER TRUE

## 2024-06-06 SDOH — ECONOMIC STABILITY: FOOD INSECURITY: WITHIN THE PAST 12 MONTHS, YOU WORRIED THAT YOUR FOOD WOULD RUN OUT BEFORE YOU GOT MONEY TO BUY MORE.: NEVER TRUE

## 2024-06-06 SDOH — ECONOMIC STABILITY: INCOME INSECURITY: HOW HARD IS IT FOR YOU TO PAY FOR THE VERY BASICS LIKE FOOD, HOUSING, MEDICAL CARE, AND HEATING?: NOT HARD AT ALL

## 2024-06-06 NOTE — TELEPHONE ENCOUNTER
Future Appointments   Date Time Provider Department Center   6/6/2024  3:40 PM Arcadio Chowdhury APRN - CNP [facility] [place]   7/1/2024  8:20 AM Arcadio Chowdhury APRN - CNP [facility] [place]

## 2024-06-06 NOTE — H&P (VIEW-ONLY)
Siobhan LAZARO Cainon is a 43 y.o. female that presents for Pre-op Exam (Elbow surgery with Dr Caden Cardoza on 6/10/24)        At the request of Dr. Cardoza Siobhan Brooks presents for pre-operative evaluation for her surgery.    Planned Surgery: radial head replacement , LCL repair,     Pt fell at home and fractured her right radial head ha conservative tx with sling and the fracture has not healed.  Pt continues to have constant right elbow pain and decreased ROM.   Hs 3 fractures of the radial head    Patient Active Problem List    Diagnosis Date Noted   • Primary hypertension 11/30/2022   • History of DVT (deep vein thrombosis) 06/29/2022   • Insulin resistance 06/06/2024   • New onset type 2 diabetes mellitus (HCC) 03/28/2024   • Anxiety 03/28/2024   • Hepatic steatosis 02/26/2024   • Hepatomegaly 02/26/2024   • Morbid obesity due to excess calories (HCC) 02/26/2024   • Allergic rhinitis due to allergen 02/17/2022   • Menorrhagia 11/21/2019       PREOPERATIVE FAMILY HISTORY  - She reports that she does not have a history of bad reaction to anesthesia.  -she does not have a family history of bleeding problems such as hemophilia, or Rosaline disease, or von Willebrand disease. Pt has MTHFR and hx DVT,     PREOPERATIVE ALLERGIES QUESTION:  No Known Allergies  she does not have a history of rash or swelling from exposure to rubber or latex.    T2DM  -new onset at last visit   -taking metformin 500 mg bid  -mounjaro 2.5 mg weekly  -tolerating well  -tries to follow a diabetic diet   -no formal activity program   -has lost 15 pounds in 4 months   -pt insulin resistant   Hemoglobin A1C POC   Date Value Ref Range Status   06/06/2024 6.1 (H) 4.3 - 5.7 % Final     Comment:     Performed at [facility] Office under CLIA #  [id number]        HTN   -taking lisinopril 10 mg daily  -no H/a dizziness or CP   Wt Readings from Last 3 Encounters:   06/06/24 109.7 kg (241 lb 12.8 oz)   03/28/24 114.1 kg (251 lb 9.6 oz)   02/26/24 116.5  kg (256 lb 12.8 oz)        PREOPERATIVE MEDICATION QUESTION:  Current Outpatient Medications   Medication Sig Dispense Refill   • Tirzepatide (MOUNJARO) 5 MG/0.5ML SOPN SC injection Inject 0.5 mLs into the skin once a week 4 mL 3   • Azelaic Acid 15 % GEL APPLY TOPICALLY 2 TIMES A DAY   TO AFFECTED AREAS 50 g 3   • buPROPion (WELLBUTRIN XL) 300 MG extended release tablet Take 1 tablet by mouth every morning 90 tablet 1   • Tirzepatide (MOUNJARO) 2.5 MG/0.5ML SOPN SC injection Inject 0.5 mLs into the skin once a week 8 mL 1   • blood glucose monitor strips Test 1 times a day & as needed for symptoms of irregular blood glucose. Dispense sufficient amount for indicated testing frequency plus additional to accommodate PRN testing needs. 100 strip 4   • glucose monitoring kit 1 kit by Does not apply route daily Dispense what insurance covers 1 kit 0   • Lancets MISC 1 each by Does not apply route daily 100 each 5   • Alcohol Swabs (ALCOHOL PADS) 70 % PADS 1 each by Does not apply route daily 100 each 3   • lisinopril (PRINIVIL;ZESTRIL) 10 MG tablet Take 1 tablet by mouth daily 90 tablet 4   • diclofenac sodium (VOLTAREN) 1 % GEL Apply 2 g topically 2 times daily 150 g 1   • pregabalin (LYRICA) 75 MG capsule Take 1 capsule by mouth 2 times daily.     • tiZANidine (ZANAFLEX) 2 MG tablet Take 1 tablet by mouth nightly as needed (prn every 8 hours) 20 tablet 1   • metFORMIN (GLUCOPHAGE) 500 MG tablet Take 1 tablet by mouth 2 times daily (with meals) 60 tablet 2   • amitriptyline (ELAVIL) 25 MG tablet Take 1 tablet by mouth nightly 90 tablet 1   • SUMAtriptan (IMITREX) 25 MG tablet Take 1 tablet by mouth at onset of headache, may repeat in 1 hour if no better. Not to exceed two doses in 24 hours. 9 tablet 3   • vitamin D (ERGOCALCIFEROL) 1.25 MG (52703 UT) CAPS capsule Take 1 capsule by mouth once a week 12 capsule 1   • DULoxetine (CYMBALTA) 30 MG extended release capsule Take 1 capsule by mouth daily 90 capsule 3   •  albuterol sulfate HFA (PROVENTIL HFA) 108 (90 Base) MCG/ACT inhaler Inhale 2 puffs into the lungs every 6 hours as needed for Wheezing 18 g 3   • fluticasone (FLONASE) 50 MCG/ACT nasal spray 1 spray by Each Nostril route daily 32 g 1   • B Complex Vitamins (VITAMIN-B COMPLEX PO) Take by mouth daily     • ketorolac (TORADOL) 10 MG tablet Take 1 tablet by mouth every 6 hours as needed for Pain (Patient not taking: Reported on 3/28/2024) 20 tablet 0     No current facility-administered medications for this visit.       she has not been taking systemic glucocorticoid this past year    CARDIAC RISK FACTORS  she does not have a history of UA or MI within that past 30 days  she does not have decompensated CHF or significant valvular disease  she does not have a history of significant cardiac arrhythmia  she denies chest pain, exertional dyspnea, orthopnea, palpitations or LE edema.    she can walk up 1 flight of stairs or 8 steps without stopping (4 METS)    she does not have a history of CAD  she does not have a history of CHF  she does not have a history of CVA or TIA  she does not have a history of DM requiring insulin  she does not have a history of Renal insufficiency (Cr > 2.0)    EKG reveals sinus rhythm with a rate of 101.  There are not Q waves and are not ST segment changes.    Lab Results   Component Value Date    WBC 9.3 06/07/2024    HGB 13.7 06/07/2024    HCT 42.7 06/07/2024    MCV 90.1 06/07/2024     06/07/2024      Lab Results   Component Value Date/Time     02/21/2024 06:30 PM    K 3.9 02/21/2024 06:30 PM    K 4.2 01/04/2023 08:20 PM     02/21/2024 06:30 PM    CO2 25 02/21/2024 06:30 PM    BUN 12 02/21/2024 06:30 PM    CREATININE 0.7 02/21/2024 06:30 PM    GLUCOSE 121 02/21/2024 06:30 PM    GLUCOSE 116 08/19/2022 11:40 AM    CALCIUM 8.9 02/21/2024 06:30 PM    LABGLOM >60 02/21/2024 06:30 PM         PREOPERATIVE REVIEW OF SYSTEMS:  she does not have a history of MRSA or VRE.  she does not  "have a history of seizures.  she does have a history of phlebitis or blood clots in arms or legs.  she does not have a history of sleep apnea.  she  does not have a history of snoring loudly enough to be heard through a closed door.    EKG: normal EKG, normal sinus rhythm.     General/Constitutional:  Negative for fever, chills, fatigue, recent weight gain or loss.  HEENT:  Negative for changes in vision, ear pain, nose pain, sore throat, dysphagia or odynophagia.    Cardiovascular:  Negative for palpitations, chest pain, dyspnea on exertion, or orthopnea   Respiratory:  Negative for  cough, hemoptysis, dyspnea or wheezing.    Gastrointestinal:  Negative for abdominal pain, nausea, vomiting, diarrhea, constipation or changes in bowel habits.    Genitourinary: Negative for dysuria or hematuria.  No frequency, urgency, or hesitancy.   Musculoskeletal: positive  for arthralgias, myalgias, no back pain.   Neurological: Negative for dizziness, syncope, focal weakness, paresthesias or headaches.   Psychiatric: Negative for depression, anxiety, SI/HI   Skin: Negative for acute skin lesions.       PHYSICAL EXAM:  Blood pressure 134/74, pulse (!) 104, temperature 97.9 °F (36.6 °C), resp. rate 16, height 1.6 m (5' 3\"), weight 109.7 kg (241 lb 12.8 oz), SpO2 96 %, not currently breastfeeding.  GEN: No acute distress  HEENT:  NCAT, PERRL, EOMI, Nares clear, turbinates pink, mucosa is moist.  Oropharynx  is clear.  Hearing grossly intact.   Dentition is normal for age.    Neck: No lymphadenopathy or masses.  Thyroid not palpable; no nodules or masses.    Heart: RRR. S1 and S2 normal, no murmurs, clicks, gallops or rubs. No carotid bruits noted.  Lungs:  CTAB,  No wheezing, ronchi, or rales. Normal symmetric air entry throughout both lung fields.  Abdomen:  Soft, non tender, non distended.  No rebound or guarding.  No organomegaly.    Extremities:  right arm deformity, unable to fully straighten right arm, can flex to 45 degrees, " Right forearm edema but no erythema, right radial pulse strong right fingers slightly edematous. O able to perform supination or RUE  Skin: No pathologic lesions or significant rash.     Psych:  Affect appropriate.  Thought process is normal without evidence of depression or psychosis.    Good insight and appropriae interaction.  Cognition and memory appear to be intact.    ASSESSMENT & PLAN  Siobhan was seen today for pre-op exam.    Diagnoses and all orders for this visit:    Preoperative clearance  -     CBC with Auto Differential; Future  -     Basic Metabolic Panel; Future  Labs reviewed  6/7/2024 pt . Acceptable risk for surgery      New onset type 2 diabetes mellitus (HCC)  -improved A1c down from 6.8 to 6.1  Pt wishes to increase mounjaro to 5 mg weekly   -     Tirzepatide (MOUNJARO) 5 MG/0.5ML SOPN SC injection; Inject 0.5 mLs into the skin once a week  -     CBC with Auto Differential; Future  -     Basic Metabolic Panel; Future    Morbid obesity due to excess calories (HCC)  Daily aerobic activity  Low carb/low sugar diet  Tolerating GLP-1 well increase to 5 mg   -     Tirzepatide (MOUNJARO) 5 MG/0.5ML SOPN SC injection; Inject 0.5 mLs into the skin once a week    Primary hypertension  -at goal continue with lisinopril 10 mg daily   -     CBC with Auto Differential; Future  -     Basic Metabolic Panel; Future    Insulin resistance    Closed displaced fracture of head of right radius, sequela    Right arm pain    History of DVT (deep vein thrombosis)  Pt will need anticoagulation postop  States she will be on eliquis per ortho   Other orders  -     POCT glycosylated hemoglobin (Hb A1C)        No follow-ups on file.

## 2024-06-06 NOTE — TELEPHONE ENCOUNTER
Pt left a message stating she is needing surgical clearance for elbow surgery on 6/10/24 in [place] and was wanting [facility] to send an approval. Pt's last appointment was on 3/28/24.     A VM was left with pt stating she would be needing an appointment.

## 2024-06-06 NOTE — PERIOPERATIVE NURSING NOTE
Message from Dr. Cardoza, to Dr. Gallagher and myself, stating that he called her and asked her to reach out to PCP for clearance note as well. Continue to await response from patient's PCP

## 2024-06-06 NOTE — PROGRESS NOTES
Shivani M Sissyon is a 43 y.o. female that presents for Pre-op Exam (Elbow surgery with Dr Adama Walters on 6/10/24)        At the request of Dr. Walters Shivani Osorio presents for pre-operative evaluation for her surgery.    Planned Surgery: radial head replacement , LCL repair,     Pt fell at home and fractured her right radial head ha conservative tx with sling and the fracture has not healed.  Pt continues to have constant right elbow pain and decreased ROM.   Hs 3 fractures of the radial head    Patient Active Problem List    Diagnosis Date Noted    Primary hypertension 11/30/2022    History of DVT (deep vein thrombosis) 06/29/2022    Insulin resistance 06/06/2024    New onset type 2 diabetes mellitus (HCC) 03/28/2024    Anxiety 03/28/2024    Hepatic steatosis 02/26/2024    Hepatomegaly 02/26/2024    Morbid obesity due to excess calories (HCC) 02/26/2024    Allergic rhinitis due to allergen 02/17/2022    Menorrhagia 11/21/2019       PREOPERATIVE FAMILY HISTORY  - She reports that she does not have a history of bad reaction to anesthesia.  -she does not have a family history of bleeding problems such as hemophilia, or Kathrin disease, or von Willebrand disease. Pt has MTHFR and hx DVT,     PREOPERATIVE ALLERGIES QUESTION:  No Known Allergies  she does not have a history of rash or swelling from exposure to rubber or latex.    T2DM  -new onset at last visit   -taking metformin 500 mg bid  -mounjaro 2.5 mg weekly  -tolerating well  -tries to follow a diabetic diet   -no formal activity program   -has lost 15 pounds in 4 months   -pt insulin resistant   Hemoglobin A1C POC   Date Value Ref Range Status   06/06/2024 6.1 (H) 4.3 - 5.7 % Final     Comment:     Performed at [facility] Office under CLIA #  [id number]        HTN   -taking lisinopril 10 mg daily  -no H/a dizziness or CP   Wt Readings from Last 3 Encounters:   06/06/24 109.7 kg (241 lb 12.8 oz)   03/28/24 114.1 kg (251 lb 9.6 oz)   02/26/24 116.5 kg (256

## 2024-06-06 NOTE — TELEPHONE ENCOUNTER
Agree, pt will need a visit I can work her in today  please reach out to her again and send my chart message Thanks!

## 2024-06-07 ENCOUNTER — NURSE ONLY (OUTPATIENT)
Dept: LAB | Age: 44
End: 2024-06-07

## 2024-06-07 DIAGNOSIS — E11.9 NEW ONSET TYPE 2 DIABETES MELLITUS (HCC): ICD-10-CM

## 2024-06-07 DIAGNOSIS — Z01.818 PREOPERATIVE CLEARANCE: ICD-10-CM

## 2024-06-07 DIAGNOSIS — I10 PRIMARY HYPERTENSION: ICD-10-CM

## 2024-06-07 LAB
ANION GAP SERPL CALC-SCNC: 10 MEQ/L (ref 8–16)
BASOPHILS ABSOLUTE: 0 THOU/MM3 (ref 0–0.1)
BASOPHILS NFR BLD AUTO: 0.4 %
BUN SERPL-MCNC: 14 MG/DL (ref 7–22)
CALCIUM SERPL-MCNC: 9.1 MG/DL (ref 8.5–10.5)
CHLORIDE SERPL-SCNC: 102 MEQ/L (ref 98–111)
CO2 SERPL-SCNC: 25 MEQ/L (ref 23–33)
CREAT SERPL-MCNC: 0.9 MG/DL (ref 0.4–1.2)
DEPRECATED RDW RBC AUTO: 46.5 FL (ref 35–45)
EOSINOPHIL NFR BLD AUTO: 2.6 %
EOSINOPHILS ABSOLUTE: 0.2 THOU/MM3 (ref 0–0.4)
ERYTHROCYTE [DISTWIDTH] IN BLOOD BY AUTOMATED COUNT: 14.2 % (ref 11.5–14.5)
GFR SERPL CREATININE-BSD FRML MDRD: 82 ML/MIN/1.73M2
GLUCOSE SERPL-MCNC: 94 MG/DL (ref 70–108)
HCT VFR BLD AUTO: 42.7 % (ref 37–47)
HGB BLD-MCNC: 13.7 GM/DL (ref 12–16)
IMM GRANULOCYTES # BLD AUTO: 0.02 THOU/MM3 (ref 0–0.07)
IMM GRANULOCYTES NFR BLD AUTO: 0.2 %
LYMPHOCYTES ABSOLUTE: 3 THOU/MM3 (ref 1–4.8)
LYMPHOCYTES NFR BLD AUTO: 32.3 %
MCH RBC QN AUTO: 28.9 PG (ref 26–33)
MCHC RBC AUTO-ENTMCNC: 32.1 GM/DL (ref 32.2–35.5)
MCV RBC AUTO: 90.1 FL (ref 81–99)
MONOCYTES ABSOLUTE: 0.5 THOU/MM3 (ref 0.4–1.3)
MONOCYTES NFR BLD AUTO: 5.6 %
NEUTROPHILS ABSOLUTE: 5.5 THOU/MM3 (ref 1.8–7.7)
NEUTROPHILS NFR BLD AUTO: 58.9 %
NRBC BLD AUTO-RTO: 0 /100 WBC
PLATELET # BLD AUTO: 336 THOU/MM3 (ref 130–400)
PMV BLD AUTO: 9.3 FL (ref 9.4–12.4)
POTASSIUM SERPL-SCNC: 4.5 MEQ/L (ref 3.5–5.2)
RBC # BLD AUTO: 4.74 MILL/MM3 (ref 4.2–5.4)
SODIUM SERPL-SCNC: 137 MEQ/L (ref 135–145)
WBC # BLD AUTO: 9.3 THOU/MM3 (ref 4.8–10.8)

## 2024-06-07 NOTE — PERIOPERATIVE NURSING NOTE
Call from patient, she states that her PCP just called her and stated that she is cleared for surgery. Note from her visit yesterday has been updated to confirm the clearance as well. Dr. Beach and Dr. Gallagher made aware of clearance/ note/ labs in [facility] via secure chat message.

## 2024-06-10 ENCOUNTER — ANESTHESIA EVENT (OUTPATIENT)
Dept: OPERATING ROOM | Facility: HOSPITAL | Age: 44
End: 2024-06-10
Payer: COMMERCIAL

## 2024-06-10 ENCOUNTER — APPOINTMENT (OUTPATIENT)
Dept: RADIOLOGY | Facility: HOSPITAL | Age: 44
End: 2024-06-10
Payer: COMMERCIAL

## 2024-06-10 ENCOUNTER — APPOINTMENT (OUTPATIENT)
Dept: CARDIOLOGY | Facility: HOSPITAL | Age: 44
End: 2024-06-10
Payer: COMMERCIAL

## 2024-06-10 ENCOUNTER — ANESTHESIA (OUTPATIENT)
Dept: OPERATING ROOM | Facility: HOSPITAL | Age: 44
End: 2024-06-10
Payer: COMMERCIAL

## 2024-06-10 ENCOUNTER — HOSPITAL ENCOUNTER (OUTPATIENT)
Facility: HOSPITAL | Age: 44
Setting detail: OUTPATIENT SURGERY
Discharge: HOME | End: 2024-06-10
Attending: ORTHOPAEDIC SURGERY | Admitting: ORTHOPAEDIC SURGERY
Payer: COMMERCIAL

## 2024-06-10 ENCOUNTER — TELEPHONE (OUTPATIENT)
Dept: FAMILY MEDICINE CLINIC | Age: 44
End: 2024-06-10

## 2024-06-10 VITALS
RESPIRATION RATE: 18 BRPM | DIASTOLIC BLOOD PRESSURE: 71 MMHG | WEIGHT: 240.74 LBS | OXYGEN SATURATION: 97 % | HEIGHT: 63 IN | HEART RATE: 91 BPM | SYSTOLIC BLOOD PRESSURE: 120 MMHG | BODY MASS INDEX: 42.66 KG/M2 | TEMPERATURE: 96.8 F

## 2024-06-10 DIAGNOSIS — S52.121A DISP FX OF HEAD OF RIGHT RADIUS, INIT FOR CLOS FX: Primary | ICD-10-CM

## 2024-06-10 DIAGNOSIS — S02.630A: ICD-10-CM

## 2024-06-10 DIAGNOSIS — M12.521 TRAUMATIC ARTHRITIS ELBOW, RIGHT: ICD-10-CM

## 2024-06-10 PROBLEM — D68.59 THROMBOPHILIA (MULTI): Status: ACTIVE | Noted: 2024-06-10

## 2024-06-10 PROBLEM — I82.409 DVT (DEEP VENOUS THROMBOSIS) (MULTI): Status: ACTIVE | Noted: 2024-06-10

## 2024-06-10 PROBLEM — R06.09 DYSPNEA ON EXERTION: Status: ACTIVE | Noted: 2024-06-10

## 2024-06-10 PROBLEM — F41.9 ANXIETY: Status: ACTIVE | Noted: 2024-06-10

## 2024-06-10 PROBLEM — F32.A DEPRESSION: Status: ACTIVE | Noted: 2024-06-10

## 2024-06-10 PROBLEM — Z86.69 HISTORY OF MIGRAINE HEADACHES: Status: ACTIVE | Noted: 2024-06-10

## 2024-06-10 PROBLEM — I26.99 PULMONARY EMBOLUS (MULTI): Status: ACTIVE | Noted: 2024-06-10

## 2024-06-10 PROBLEM — E11.9 DIABETES MELLITUS, TYPE 2 (MULTI): Status: ACTIVE | Noted: 2024-06-10

## 2024-06-10 PROBLEM — I10 HTN (HYPERTENSION): Status: ACTIVE | Noted: 2024-06-10

## 2024-06-10 LAB
GLUCOSE BLD MANUAL STRIP-MCNC: 96 MG/DL (ref 74–99)
HCG UR QL IA.RAPID: NEGATIVE

## 2024-06-10 PROCEDURE — 3600000004 HC OR TIME - INITIAL BASE CHARGE - PROCEDURE LEVEL FOUR: Performed by: ORTHOPAEDIC SURGERY

## 2024-06-10 PROCEDURE — 2500000004 HC RX 250 GENERAL PHARMACY W/ HCPCS (ALT 636 FOR OP/ED): Performed by: ANESTHESIOLOGIST ASSISTANT

## 2024-06-10 PROCEDURE — 24666 OPTX RADIAL HEAD/NCK FX RPLC: CPT | Performed by: ORTHOPAEDIC SURGERY

## 2024-06-10 PROCEDURE — 7100000010 HC PHASE TWO TIME - EACH INCREMENTAL 1 MINUTE: Performed by: ORTHOPAEDIC SURGERY

## 2024-06-10 PROCEDURE — 2500000004 HC RX 250 GENERAL PHARMACY W/ HCPCS (ALT 636 FOR OP/ED): Performed by: STUDENT IN AN ORGANIZED HEALTH CARE EDUCATION/TRAINING PROGRAM

## 2024-06-10 PROCEDURE — 7100000009 HC PHASE TWO TIME - INITIAL BASE CHARGE: Performed by: ORTHOPAEDIC SURGERY

## 2024-06-10 PROCEDURE — 81025 URINE PREGNANCY TEST: CPT | Performed by: PHYSICIAN ASSISTANT

## 2024-06-10 PROCEDURE — 3600000009 HC OR TIME - EACH INCREMENTAL 1 MINUTE - PROCEDURE LEVEL FOUR: Performed by: ORTHOPAEDIC SURGERY

## 2024-06-10 PROCEDURE — 7100000001 HC RECOVERY ROOM TIME - INITIAL BASE CHARGE: Performed by: ORTHOPAEDIC SURGERY

## 2024-06-10 PROCEDURE — 24343 REPR ELBOW LAT LIGMNT W/TISS: CPT | Performed by: PHYSICIAN ASSISTANT

## 2024-06-10 PROCEDURE — 2720000007 HC OR 272 NO HCPCS: Performed by: ORTHOPAEDIC SURGERY

## 2024-06-10 PROCEDURE — 2500000004 HC RX 250 GENERAL PHARMACY W/ HCPCS (ALT 636 FOR OP/ED): Performed by: ORTHOPAEDIC SURGERY

## 2024-06-10 PROCEDURE — 24343 REPR ELBOW LAT LIGMNT W/TISS: CPT | Performed by: ORTHOPAEDIC SURGERY

## 2024-06-10 PROCEDURE — 82947 ASSAY GLUCOSE BLOOD QUANT: CPT

## 2024-06-10 PROCEDURE — 3700000002 HC GENERAL ANESTHESIA TIME - EACH INCREMENTAL 1 MINUTE: Performed by: ORTHOPAEDIC SURGERY

## 2024-06-10 PROCEDURE — 3700000001 HC GENERAL ANESTHESIA TIME - INITIAL BASE CHARGE: Performed by: ORTHOPAEDIC SURGERY

## 2024-06-10 PROCEDURE — 2780000003 HC OR 278 NO HCPCS: Performed by: ORTHOPAEDIC SURGERY

## 2024-06-10 PROCEDURE — 2500000004 HC RX 250 GENERAL PHARMACY W/ HCPCS (ALT 636 FOR OP/ED): Performed by: PHYSICIAN ASSISTANT

## 2024-06-10 PROCEDURE — 2500000004 HC RX 250 GENERAL PHARMACY W/ HCPCS (ALT 636 FOR OP/ED): Mod: JZ | Performed by: PHYSICIAN ASSISTANT

## 2024-06-10 PROCEDURE — 2500000005 HC RX 250 GENERAL PHARMACY W/O HCPCS: Performed by: ANESTHESIOLOGIST ASSISTANT

## 2024-06-10 PROCEDURE — 93005 ELECTROCARDIOGRAM TRACING: CPT

## 2024-06-10 PROCEDURE — 24666 OPTX RADIAL HEAD/NCK FX RPLC: CPT | Performed by: PHYSICIAN ASSISTANT

## 2024-06-10 PROCEDURE — 7100000002 HC RECOVERY ROOM TIME - EACH INCREMENTAL 1 MINUTE: Performed by: ORTHOPAEDIC SURGERY

## 2024-06-10 DEVICE — ICONIX 2 NEEDLES WITH INTELLIBRAID TECHNOLOGY, 2.3MM ANCHOR WITH 2.0MM XBRAID TT
Type: IMPLANTABLE DEVICE | Site: ELBOW | Status: FUNCTIONAL
Brand: ICONIX

## 2024-06-10 RX ORDER — MIDAZOLAM HYDROCHLORIDE 1 MG/ML
2 INJECTION, SOLUTION INTRAMUSCULAR; INTRAVENOUS ONCE
Status: COMPLETED | OUTPATIENT
Start: 2024-06-10 | End: 2024-06-10

## 2024-06-10 RX ORDER — SODIUM CHLORIDE, SODIUM LACTATE, POTASSIUM CHLORIDE, CALCIUM CHLORIDE 600; 310; 30; 20 MG/100ML; MG/100ML; MG/100ML; MG/100ML
100 INJECTION, SOLUTION INTRAVENOUS CONTINUOUS
Status: DISCONTINUED | OUTPATIENT
Start: 2024-06-10 | End: 2024-06-10 | Stop reason: HOSPADM

## 2024-06-10 RX ORDER — ONDANSETRON HYDROCHLORIDE 2 MG/ML
INJECTION, SOLUTION INTRAVENOUS AS NEEDED
Status: DISCONTINUED | OUTPATIENT
Start: 2024-06-10 | End: 2024-06-10

## 2024-06-10 RX ORDER — FENTANYL CITRATE 50 UG/ML
50 INJECTION, SOLUTION INTRAMUSCULAR; INTRAVENOUS ONCE
Status: COMPLETED | OUTPATIENT
Start: 2024-06-10 | End: 2024-06-10

## 2024-06-10 RX ORDER — ONDANSETRON HYDROCHLORIDE 2 MG/ML
4 INJECTION, SOLUTION INTRAVENOUS ONCE AS NEEDED
Status: DISCONTINUED | OUTPATIENT
Start: 2024-06-10 | End: 2024-06-10 | Stop reason: HOSPADM

## 2024-06-10 RX ORDER — PROPOFOL 10 MG/ML
INJECTION, EMULSION INTRAVENOUS AS NEEDED
Status: DISCONTINUED | OUTPATIENT
Start: 2024-06-10 | End: 2024-06-10

## 2024-06-10 RX ORDER — ACETAMINOPHEN 325 MG/1
650 TABLET ORAL EVERY 4 HOURS PRN
Status: DISCONTINUED | OUTPATIENT
Start: 2024-06-10 | End: 2024-06-10 | Stop reason: HOSPADM

## 2024-06-10 RX ORDER — PHENYLEPHRINE HCL IN 0.9% NACL 1 MG/10 ML
SYRINGE (ML) INTRAVENOUS AS NEEDED
Status: DISCONTINUED | OUTPATIENT
Start: 2024-06-10 | End: 2024-06-10

## 2024-06-10 RX ORDER — ASPIRIN 81 MG/1
81 TABLET ORAL 2 TIMES DAILY
Qty: 60 TABLET | Refills: 0 | Status: SHIPPED | OUTPATIENT
Start: 2024-06-10 | End: 2024-07-10

## 2024-06-10 RX ORDER — OXYCODONE HYDROCHLORIDE 5 MG/1
10 TABLET ORAL EVERY 4 HOURS PRN
Status: DISCONTINUED | OUTPATIENT
Start: 2024-06-10 | End: 2024-06-10 | Stop reason: HOSPADM

## 2024-06-10 RX ORDER — VANCOMYCIN HYDROCHLORIDE 1 G/20ML
INJECTION, POWDER, LYOPHILIZED, FOR SOLUTION INTRAVENOUS AS NEEDED
Status: DISCONTINUED | OUTPATIENT
Start: 2024-06-10 | End: 2024-06-10 | Stop reason: HOSPADM

## 2024-06-10 RX ORDER — CEPHALEXIN 500 MG/1
500 CAPSULE ORAL 3 TIMES DAILY
Qty: 3 CAPSULE | Refills: 0 | Status: SHIPPED | OUTPATIENT
Start: 2024-06-10 | End: 2024-06-11

## 2024-06-10 RX ORDER — AMOXICILLIN 250 MG
1 CAPSULE ORAL DAILY
Qty: 7 TABLET | Refills: 2 | Status: SHIPPED | OUTPATIENT
Start: 2024-06-10 | End: 2024-07-01

## 2024-06-10 RX ORDER — CEFAZOLIN SODIUM 2 G/100ML
2 INJECTION, SOLUTION INTRAVENOUS ONCE
Status: DISCONTINUED | OUTPATIENT
Start: 2024-06-10 | End: 2024-06-10

## 2024-06-10 RX ORDER — OXYCODONE HYDROCHLORIDE 5 MG/1
5 TABLET ORAL EVERY 4 HOURS PRN
Status: DISCONTINUED | OUTPATIENT
Start: 2024-06-10 | End: 2024-06-10 | Stop reason: HOSPADM

## 2024-06-10 RX ORDER — CEFAZOLIN SODIUM 2 G/100ML
2 INJECTION, SOLUTION INTRAVENOUS ONCE
Status: COMPLETED | OUTPATIENT
Start: 2024-06-10 | End: 2024-06-10

## 2024-06-10 RX ORDER — ALBUTEROL SULFATE 0.83 MG/ML
2.5 SOLUTION RESPIRATORY (INHALATION) ONCE AS NEEDED
Status: DISCONTINUED | OUTPATIENT
Start: 2024-06-10 | End: 2024-06-10 | Stop reason: HOSPADM

## 2024-06-10 RX ORDER — LIDOCAINE HYDROCHLORIDE 10 MG/ML
INJECTION, SOLUTION EPIDURAL; INFILTRATION; INTRACAUDAL; PERINEURAL AS NEEDED
Status: DISCONTINUED | OUTPATIENT
Start: 2024-06-10 | End: 2024-06-10

## 2024-06-10 RX ORDER — OXYCODONE AND ACETAMINOPHEN 5; 325 MG/1; MG/1
1 TABLET ORAL EVERY 6 HOURS PRN
Qty: 30 TABLET | Refills: 0 | Status: SHIPPED | OUTPATIENT
Start: 2024-06-10 | End: 2024-06-17

## 2024-06-10 RX ADMIN — LIDOCAINE HYDROCHLORIDE 5 ML: 10 INJECTION, SOLUTION EPIDURAL; INFILTRATION; INTRACAUDAL; PERINEURAL at 07:17

## 2024-06-10 RX ADMIN — FENTANYL CITRATE 50 MCG: 50 INJECTION INTRAMUSCULAR; INTRAVENOUS at 06:53

## 2024-06-10 RX ADMIN — SODIUM CHLORIDE, SODIUM LACTATE, POTASSIUM CHLORIDE, AND CALCIUM CHLORIDE 100 ML/HR: 600; 310; 30; 20 INJECTION, SOLUTION INTRAVENOUS at 06:13

## 2024-06-10 RX ADMIN — PROPOFOL 50 MG: 10 INJECTION, EMULSION INTRAVENOUS at 08:36

## 2024-06-10 RX ADMIN — Medication 50 MCG: at 08:00

## 2024-06-10 RX ADMIN — CEFAZOLIN SODIUM 2 G: 2 INJECTION, SOLUTION INTRAVENOUS at 07:20

## 2024-06-10 RX ADMIN — Medication 150 MCG: at 07:47

## 2024-06-10 RX ADMIN — Medication 100 MCG: at 07:38

## 2024-06-10 RX ADMIN — Medication 100 MCG: at 07:33

## 2024-06-10 RX ADMIN — MIDAZOLAM 2 MG: 1 INJECTION INTRAMUSCULAR; INTRAVENOUS at 06:53

## 2024-06-10 RX ADMIN — PROPOFOL 100 MG: 10 INJECTION, EMULSION INTRAVENOUS at 08:33

## 2024-06-10 RX ADMIN — PROPOFOL 50 MG: 10 INJECTION, EMULSION INTRAVENOUS at 08:39

## 2024-06-10 RX ADMIN — PROPOFOL 200 MG: 10 INJECTION, EMULSION INTRAVENOUS at 07:17

## 2024-06-10 RX ADMIN — ONDANSETRON 4 MG: 2 INJECTION INTRAMUSCULAR; INTRAVENOUS at 08:24

## 2024-06-10 RX ADMIN — Medication 100 MCG: at 08:07

## 2024-06-10 ASSESSMENT — PAIN - FUNCTIONAL ASSESSMENT
PAIN_FUNCTIONAL_ASSESSMENT: 0-10

## 2024-06-10 ASSESSMENT — PAIN SCALES - GENERAL
PAINLEVEL_OUTOF10: 0 - NO PAIN
PAINLEVEL_OUTOF10: 2
PAINLEVEL_OUTOF10: 0 - NO PAIN

## 2024-06-10 ASSESSMENT — COLUMBIA-SUICIDE SEVERITY RATING SCALE - C-SSRS
2. HAVE YOU ACTUALLY HAD ANY THOUGHTS OF KILLING YOURSELF?: NO
6. HAVE YOU EVER DONE ANYTHING, STARTED TO DO ANYTHING, OR PREPARED TO DO ANYTHING TO END YOUR LIFE?: NO
1. IN THE PAST MONTH, HAVE YOU WISHED YOU WERE DEAD OR WISHED YOU COULD GO TO SLEEP AND NOT WAKE UP?: NO

## 2024-06-10 NOTE — ANESTHESIA POSTPROCEDURE EVALUATION
Patient: Siobhan Brooks    Procedure Summary       Date: 06/10/24 Room / Location: [facility] OR 05 / Virtual [facility]    Anesthesia Start: 0710 Anesthesia Stop: 0854    Procedure: Radial head replacement ( Radial Head replacement, LCL repair, possible Coronoid repair, possible microfracture ) ( [employer] Radial Heads, Karen Mini Frag (backup), micro sagittal saw, iconix suture anchors, hewson suture passer,  flat sher, radiolucent arm board ) (Right: Elbow) Diagnosis:       Disp fx of head of right radius, init for clos fx      Traumatic arthritis elbow, right      Closed fracture of coronoid process of mandible, initial encounter (Multi)      (Disp fx of head of right radius, init for clos fx [S52.121A])      (Traumatic arthritis elbow, right [M12.521])      (Closed fracture of coronoid process of mandible, initial encounter (Multi) [S02.630A])    Surgeons: Caden Cardoza MD Responsible Provider: Nani Albrecht MD    Anesthesia Type: general, regional ASA Status: 3            Anesthesia Type: general, regional    Vitals Value Taken Time   /68 06/10/24 0905   Temp 36 °C (96.8 °F) 06/10/24 0849   Pulse 95 06/10/24 0905   Resp 17 06/10/24 0905   SpO2 92 % 06/10/24 0905       Anesthesia Post Evaluation    Patient location during evaluation: bedside  Patient participation: complete - patient participated  Level of consciousness: awake and alert  Pain management: adequate  Multimodal analgesia pain management approach  Airway patency: patent  Cardiovascular status: acceptable  Respiratory status: acceptable  Hydration status: acceptable  Postoperative Nausea and Vomiting: none        There were no known notable events for this encounter.

## 2024-06-10 NOTE — PERIOPERATIVE NURSING NOTE
Pt awake, oriented. CDB reviewed with education following upper extremity PNB.   Pt denies pain or PONV, no repeat BS ordered. Pt states only taken once daily.

## 2024-06-10 NOTE — TELEPHONE ENCOUNTER
----- Message from REECE Galarza - CNP sent at 6/9/2024  8:27 PM EDT -----  Labs stale and in appropriate range, pt cleared for surgery

## 2024-06-10 NOTE — ANESTHESIA PROCEDURE NOTES
Airway  Date/Time: 6/10/2024 7:18 AM  Urgency: elective    Airway not difficult    Staffing  Performed: KEI   Authorized by: Nani Albrecht MD    Performed by: KEI Hayden  Patient location during procedure: OR    Indications and Patient Condition  Indications for airway management: anesthesia  Spontaneous Ventilation: absent  Sedation level: deep  Preoxygenated: yes  Patient position: sniffing  Mask difficulty assessment: 1 - vent by mask    Final Airway Details  Final airway type: supraglottic airway      Successful airway: classic  Size 4  Cuff Pressure (cm H2O): 10     Number of attempts at approach: 1    Additional Comments  Easy placement, no problems. (Ambu)

## 2024-06-10 NOTE — PERIOPERATIVE NURSING NOTE
Pt received from OR via cart, monitors on, report received. Pt arousable, reoriented. Pt denies pain or PONV. Orders reviewed/released. Pt safety maintained.

## 2024-06-10 NOTE — OP NOTE
Radial head replacement LCL repair, elbow manipulation Operative Note, right     Date: 6/10/2024  OR Location: [facility] OR    Name: Siobhan Brooks, : 1980, Age: 43 y.o., MRN: [id number], Sex: female    Diagnosis  Pre-op Diagnosis     * Disp fx of head of right radius, init for clos fx [S52.121A]     * Traumatic arthritis elbow, right [M12.521]     * Closed fracture of coronoid process of ulna, initial encounter Post-op Diagnosis     * Disp fx of head of right radius, init for clos fx [S52.121A]     * Traumatic arthritis elbow, right [M12.521]     * Closed fracture of coronoid process of ulna, initial encounter  Arthrofibrosis right elbow     Procedures  Radial head replacement  LCL repair with local tissue  Anterior elbow capsule release for arthrofibrosis  Elbow manipulation under anesthesia    Surgeons      * Caden Cardoza - Primary    Resident/Fellow/Other Assistant:  Surgeons and Role:     * Altagracia Villar PA-C - KALYAN First Assist  First Assistant: Altagracia Villar PA-C, who was critical and essential as a first assistant as there was no qualified resident available.    Procedure Summary  Anesthesia: General  ASA: III  Anesthesia Staff: Anesthesiologist: Nani Albrecht MD  C-AA: KEI Hayden  Estimated Blood Loss: Please see anesthesia record  Intra-op Medications:   Administrations occurring from 0700 to 0930 on 06/10/24:   Medication Name Total Dose   vancomycin (Vancocin) vial for injection 1 g   lactated Ringer's infusion Cannot be calculated   ceFAZolin in dextrose (iso-os) (Ancef) IVPB 2 g 2 g              Anesthesia Record               Intraprocedure I/O Totals          Intake    ceFAZolin in dextrose (iso-os) (Ancef) IVPB 2 g 100.00 mL    Total Intake 100 mL          Specimen: No specimens collected     Staff:   Circulator: Molly Blasub Person: Temo Kessler Person: Comfort         Drains and/or Catheters: * None in log *    Tourniquet Times:     Total Tourniquet Time Documented:  Arm -  Upper (Right) - 42 minutes  Total: Arm - Upper (Right) - 42 minutes      Implants:  Implants       Type Name Action Serial No.       2.3MM ICONIX ANCHOR Implanted       +2 6.5MM EVOLVE MODULAR RADIAL XL STEM ELBOW COMPONENT Implanted       STANDARD 22MM EVOLVE MODULAR RADIAL HEAD ELBOW COMPONENT Implanted               Findings: Please see below    Indications: Siobhan Brooks is an 43 y.o. female who is having surgery for terrible triad type elbow injury with comminuted radial head fracture failing conservative management.    The patient was seen in the preoperative area. The risks, benefits, complications, treatment options, non-operative alternatives, expected recovery and outcomes were discussed with the patient. The possibilities of reaction to medication, pulmonary aspiration, injury to surrounding structures, bleeding, recurrent infection, the need for additional procedures, failure to diagnose a condition, and creating a complication requiring transfusion or operation were discussed with the patient. The patient concurred with the proposed plan, giving informed consent.  The site of surgery was properly noted/marked if necessary per policy. The patient has been actively warmed in preoperative area. Preoperative antibiotics have been ordered and given within 1 hours of incision. Venous thrombosis prophylaxis have been ordered including bilateral sequential compression devices    Procedure Details: The patient was met in the preoperative holding area and identified by name and medical record number.  She was transferred the operating room and positioned supine on a flat Sergo table with a radiolucent arm board.  General anesthesia was induced.  Preoperative block was performed prior to general anesthesia induction.  Preoperative timeout was performed by myself prior to prepping and the correct operative site was identified.  The limb was then prepped and draped in the usual sterile fashion using ChloraPrep.   We made a standard curvilinear incision to the lateral elbow and the Peterson interval.  The skin was incised with 10 blade scalpel after tourniquet was elevated.  A sterile 18 tourniquet was placed on the upper arm.  There was a deep subcutaneous fascial layer that was dissected down to the elbow fascia.  The L UCL was present but you could tell that it was incompetent and stretched from previous injury.  This was divided longitudinally as well as the elbow capsule and annular ligament.  The radial head and neck were then exposed and blunt retractors were placed around the neck of the radius to protect the posterior interosseous nerve.  An oscillating saw then used to create an osteotomy of the radial neck.  The radial head was then removed.  A large portion of the medial radial head was missing and there was a large degree of fibrocartilage irregularity on the radial head after excision.  A large piece of radial head was then excised and the area between the radius and the ulna as well as a piece from the anterior capsule.  The pieces were reconstructed on the back table and the template tray and she sized just over a 22.  We elected to downsize and try out at a 22 to avoid overstuffing the joint.  With the radial head out it was noted that she was still quite contracted.  She had a large degree of arthrofibrosis from the timeframe from her injury until point of surgery being in a sling and limited movement.  I elected this point to perform a manipulation under anesthesia of the elbow to ensure that her postoperative range of motion is maximized.  Both in extension and a flexion manipulation were performed with palpable release of scar tissue and I was able to perform a lysis of adhesions with dissection using Metzenbaum scissors and the anterior capsule to released to mobilize the additional scar tissue.  With the capsular release and the manipulation performed we then moved on with our trials.  I trialed first a  +0 head after broaching to a size 6.5 mm stem.  She was a little short with a +0 on radiographic views so I upsized to a +2.  I did not want to go higher than this to avoid overstuffing and to maintain her mobility.  With the trial in place range of motion was assessed and she felt stable and congruent.  I then implanted a final [employer] radial head implant with a 22+0 head and a 6.5 mm +2 stem.  This was assembled on the back table and then implanted.  We then used a 2.3 mm iconix anchor from [employer] with preloaded suture tape.  I was able to imbricate and repair using local tissue the L UCL and lateral capsule back to the anchor point in mattress fashion.  The additional preloaded suture was then used to run and repair the fascia.  Prior to this copious amounts of irrigation were performed after tourniquet was dropped and vancomycin powder was placed deeply to prevent infection.  Final x-rays were taken and saved on AP and lateral views indicating implant in place without signs of overstuffing.  Remainder the wound was closed in layers and a sterile dressing was applied.  A posterior mold sugar-tong splint in 90 degrees of flexion and pronation of the forearm was then performed.    Postoperative plan:    No lifting right upper extremity.  Splint for 1 week and follow-up for splint removal and initiation of motion uncoupled in a hinged elbow brace.  We will get aggressive with her motion given her contracture.  Will see her back at the 2-week reshma for 2 views of the right elbow and staple removal.  DVT prophylaxis with aspirin and 24 hours of oral antibiotics for infection prophylaxis.  Complications:  None; patient tolerated the procedure well.    Disposition: PACU - hemodynamically stable.  Condition: stable         Additional Details: Patient also be noted that the capitellum demonstrated grade 2/3 changes to the articular surface centrally with partial-thickness cartilage  wear.    Attending Attestation: I was present and scrubbed for the key portions of the procedure.    Caden Cardoza  Phone Number: [phone]

## 2024-06-10 NOTE — ANESTHESIA PREPROCEDURE EVALUATION
Patient: Siobhan Brooks    Procedure Information       Date/Time: 06/10/24 0700    Procedure: Radial head replacement ( Radial Head replacement, LCL repair, possible Coronoid repair, possible microfracture ) ( Karen [employer] Radial Heads, Karen Mini Frag (backup), micro sagittal saw, iconix suture anchors, hewson suture passer,  flat sher, radiolucent arm board ) (Right: Elbow) - Radial Head replacement, LCL repair, possible Coronoid repair, possible microfracture    Location: [facility] / [facility]    Surgeons: Caden Cardoza MD            Relevant Problems   Anesthesia  MTHFR MUTATION has had general anesthesia in the past for achilles repair and D&C with no issues      Cardiac   (+) HTN (hypertension)      Pulmonary   (+) Pulmonary embolus (Multi) (Provoked DVT and PE after achilles surgery - MTHFR mutation)      Neuro   (+) Anxiety   (+) Depression   (+) History of migraine headaches      GI (within normal limits)      /Renal (within normal limits)      Liver (within normal limits)  Hepatic steatosis      Endocrine   (+) Diabetes mellitus, type 2 (Multi)      Hematology  MTHFR mutation   (+) DVT (deep venous thrombosis) (Multi)   (+) Thrombophilia (Multi)      Musculoskeletal  Right radial head fracture      HEENT (within normal limits)      ID (within normal limits)      Skin (within normal limits)      GYN (within normal limits)     Past Medical History:   Diagnosis Date    Anxiety     Arthritis     Chronic headaches     Chronic pain disorder     NECK- BONE SPURS    DVT (deep venous thrombosis) (Multi)     Hypertension     MTHFR mutation     Pneumonia     Pulmonary embolism (Multi)     Type 2 diabetes mellitus (Multi)     Vertigo      Past Surgical History:   Procedure Laterality Date    ACHILLES TENDON SURGERY Right 2018     SECTION, CLASSIC      ,     KNEE SURGERY Left     MCL REPAIR     No Known Allergies      Clinical information reviewed:   Tobacco  Allergies   Meds   Med Hx  Surg Hx   Fam Hx  Soc Hx        NPO Detail:  NPO/Void Status  Date of Last Liquid: 06/09/24  Time of Last Liquid: 2200  Date of Last Solid: 06/09/24  Time of Last Solid: 2030  Time of Last Void: 0545         Physical Exam    Airway  Mallampati: II  TM distance: >3 FB  Neck ROM: full     Cardiovascular   Rhythm: regular  Rate: normal     Dental    Pulmonary   Breath sounds clear to auscultation     Abdominal            Anesthesia Plan    History of general anesthesia?: yes  History of complications of general anesthesia?: no    ASA 3     general and regional   (No N2O )  intravenous induction   Postoperative administration of opioids is intended.  Anesthetic plan and risks discussed with patient.  Use of blood products discussed with patient who.

## 2024-06-10 NOTE — POST-PROCEDURE NOTE
Patient in Phase 2; dressed and up to chair with RN assist. Tolerating po fluids, no complaint of pain and no complaint of nausea.     Significant other at bedside; discussed discharge instructions with patient and Significant other. All questions at this time answered.     Patient clinically appropriate for discharge. IV removed and patient transported to discharge area via wheelchair.       normal... Well appearing, well nourished, awake, alert, oriented to person, place, time/situation and in no apparent distress.

## 2024-06-11 LAB
ATRIAL RATE: 80 BPM
P AXIS: 16 DEGREES
P OFFSET: 189 MS
P ONSET: 133 MS
PR INTERVAL: 166 MS
Q ONSET: 216 MS
QRS COUNT: 13 BEATS
QRS DURATION: 88 MS
QT INTERVAL: 378 MS
QTC CALCULATION(BAZETT): 435 MS
QTC FREDERICIA: 416 MS
R AXIS: 21 DEGREES
T AXIS: 13 DEGREES
T OFFSET: 405 MS
VENTRICULAR RATE: 80 BPM

## 2024-06-14 DIAGNOSIS — E66.01 MORBID OBESITY DUE TO EXCESS CALORIES (HCC): ICD-10-CM

## 2024-06-14 NOTE — TELEPHONE ENCOUNTER
Recent Visits  Date Type Provider Dept   06/06/24 Office Visit Arcadio Chowdhury APRN - CNP [facility]   03/28/24 Office Visit Arcadio Chowdhury APRN - CNP [facility]   02/26/24 Office Visit Arcadio Chowdhury APRN - CNP [facility]   04/18/23 Office Visit Arcadio Chowdhury APRN - CNP [facility]   Showing recent visits within past 540 days with a meds authorizing provider and meeting all other requirements  Future Appointments  Date Type Provider Dept   07/01/24 Appointment Arcadio Chowdhury APRN - CNP [facility]   Showing future appointments within next 150 days with a meds authorizing provider and meeting all other requirements

## 2024-06-18 ENCOUNTER — OFFICE VISIT (OUTPATIENT)
Dept: ORTHOPEDIC SURGERY | Facility: CLINIC | Age: 44
End: 2024-06-18
Payer: COMMERCIAL

## 2024-06-18 ENCOUNTER — APPOINTMENT (OUTPATIENT)
Dept: RADIOLOGY | Facility: CLINIC | Age: 44
End: 2024-06-18
Payer: COMMERCIAL

## 2024-06-18 VITALS — BODY MASS INDEX: 44.16 KG/M2 | HEIGHT: 62 IN | WEIGHT: 240 LBS

## 2024-06-18 DIAGNOSIS — S52.121A DISP FX OF HEAD OF RIGHT RADIUS, INIT FOR CLOS FX: ICD-10-CM

## 2024-06-18 PROCEDURE — 1036F TOBACCO NON-USER: CPT | Performed by: PHYSICIAN ASSISTANT

## 2024-06-18 PROCEDURE — 4010F ACE/ARB THERAPY RXD/TAKEN: CPT | Performed by: PHYSICIAN ASSISTANT

## 2024-06-18 PROCEDURE — L3761 EO, ADJ LOCK JOINT PREFAB OT: HCPCS | Performed by: PHYSICIAN ASSISTANT

## 2024-06-18 PROCEDURE — 99024 POSTOP FOLLOW-UP VISIT: CPT | Performed by: PHYSICIAN ASSISTANT

## 2024-06-18 NOTE — PROGRESS NOTES
History of Present Illness   Siobhan Brooks is a 43 y.o. female presenting today for post-op check from R radial head arthroplasty on 6/10/24. Overall doing ok. Has mild pain that is not requiring narcotics for pain control. Has been compliant with NWB in splint. Denies numbness, tingling, f/c, CP, SOB or any other complaints or concerns.      Past Medical History:   Diagnosis Date    Anxiety     Arthritis     Chronic headaches     Chronic pain disorder     NECK- BONE SPURS    DVT (deep venous thrombosis) (Multi)     Hypertension     MTHFR mutation     Pneumonia     Pulmonary embolism (Multi)     Type 2 diabetes mellitus (Multi)     Vertigo        Medication Documentation Review Audit       Reviewed by Sarai Lemus on 24 at 1111      Medication Order Taking? Sig Documenting Provider Last Dose Status   amitriptyline (Elavil) 25 mg tablet [id number] No Take 1 tablet (25 mg) by mouth once daily at bedtime. Historical Provider, MD 2024 Active   aspirin 81 mg EC tablet [id number]  Take 1 tablet (81 mg) by mouth 2 times a day. Altagracia Villar PA-C  Active   aspirin-acetaminophen-caffeine (Excedrin Migraine) 250-250-65 mg tablet [id number] No Take 1 tablet by mouth every 6 hours if needed for headaches. Historical Provider, MD Past Month Active   buPROPion XL (Wellbutrin XL) 300 mg 24 hr tablet [id number] No Take 1 tablet (300 mg) by mouth once daily in the morning. Do not crush, chew, or split. Annalisa Tinsley MD 6/10/2024 Active   cephalexin (Keflex) 500 mg capsule [id number]  Take 1 capsule (500 mg) by mouth 3 times a day for 1 day. Altagracia Villar PA-C   24 2359   DULoxetine (Cymbalta) 30 mg DR capsule [id number] No Take 1 capsule (30 mg) by mouth once daily at bedtime. Historical Provider, MD 2024 Active   ERGOCALCIFEROL, VITAMIN D2, ORAL [id number] No Take 1 tablet by mouth 1 (one) time per week. Historical Provider, MD Past Month Active   ibuprofen 600 mg tablet [id number] No Take 1  tablet (600 mg) by mouth every 8 hours if needed for mild pain (1 - 3) or moderate pain (4 - 6). Take with food or snack MACO Gunter-CNP Past Month Active   lisinopril 10 mg tablet [id number] No Take 1 tablet (10 mg) by mouth once daily in the morning. Historical Provider, MD 2024 Active   metFORMIN (Glucophage) 500 mg tablet [id number] No Take 1 tablet (500 mg) by mouth 2 times daily (morning and late afternoon). Historical Provider, MD 2024 Active   oxyCODONE-acetaminophen (Percocet) 5-325 mg tablet [id number]  Take 1 tablet by mouth every 6 hours if needed for severe pain (7 - 10) for up to 7 days. Altagracia Villar PA-C   24   pregabalin (Lyrica) 50 mg capsule [id number] No Take 1 capsule (50 mg) by mouth 2 times a day. Historical Provider, MD 2024 Active   sennosides-docusate sodium (Bonnie-Colace) 8.6-50 mg tablet [id number]  Take 1 tablet by mouth once daily for 21 days. Altagracia Villar PA-C  Active   tirzepatide (Mounjaro) 2.5 mg/0.5 mL pen injector [id number] No Inject 2.5 mg under the skin every 7 days.  Historical Provider, MD Past Week Active                    No Known Allergies    Social History     Socioeconomic History    Marital status:      Spouse name: Not on file    Number of children: Not on file    Years of education: Not on file    Highest education level: Not on file   Occupational History    Not on file   Tobacco Use    Smoking status: Never     Passive exposure: Past    Smokeless tobacco: Never   Vaping Use    Vaping status: Never Used   Substance and Sexual Activity    Alcohol use: Yes     Comment: 2-3/ MONTH    Drug use: Not Currently    Sexual activity: Defer   Other Topics Concern    Not on file   Social History Narrative    Not on file     Social Determinants of Health     Financial Resource Strain: Low Risk  (2024)    Received from [facility]    Overall Financial Resource Strain (CARDIA)     Difficulty of Paying  Living Expenses: Not hard at all   Food Insecurity: No Food Insecurity (2024)    Received from [facility]    Hunger Vital Sign     Worried About Running Out of Food in the Last Year: Never true     Ran Out of Food in the Last Year: Never true   Transportation Needs: Unknown (2024)    Received from [facility]    PRAPARE - Transportation     Lack of Transportation (Medical): Not on file     Lack of Transportation (Non-Medical): No   Physical Activity: Not on file   Stress: Not on file   Social Connections: Not on file   Intimate Partner Violence: Not on file   Housing Stability: Unknown (2024)    Received from [facility]    Housing Stability Vital Sign     Unable to Pay for Housing in the Last Year: Not on file     Number of Places Lived in the Last Year: Not on file     Unstable Housing in the Last Year: No       Past Surgical History:   Procedure Laterality Date    ACHILLES TENDON SURGERY Right 2018     SECTION, CLASSIC      ,     KNEE SURGERY Left     MCL REPAIR          Review of Systems:  30 point ROS reviewed and negative other than as listed in the HPI     Physical Exam:  Gen: The pt is A&Ox3, NAD, and appear state age and weight  Psychiatric: mood and affect are appropriate   Eyes: sclera are white, EOM grossly intact  ENT: MMM  Neck: supple, thyroid is midline  Respiratory: respirations are nonlabored, chest rise symmetric  CV: rate is regular by palpation of distal pulses  Abdomen: nondistended   Integument: no obvious cutaneous lesions noted. No signs of lymphangitis. No signs of systemic edema.   MSK: right elbow surgical incision well healing without edema, erythema, drainage, or other s/sx of infection. Appropriately tender to palpation around the incision. SILT intact throughout axillary, radial, median, and ulnar nerve distributions. Hand warm and well perfused.     Imaging:  No imaging obtained today       Assessment   43 y.o. female post-op from R radial head arthroplasty on 6/10/24.     Plan:  Continue NWB on  RUE; transitioned to t-scope brace and well get her going with therapy on ROM exercises. She can do uncoupled ROM.    Patient was prescribed a t scope elbow brace for  R elbow  injury. This mobility device is required for the following reasons:     1. The patient has a mobility limitation that significantly impairs their ability to participate in one or more mobility-related activities of daily living (MRADL) in the home; and  2. The patient is able to safely use the mobility device; and  3. The functional mobility deficit can be sufficiently resolved with use of the mobility device     Verbal and written instructions for the use, wear schedule, cleaning and application of this item were given. Education provided also included gait training and safety precautions when using this device. Patient was instructed that should the item result in increased pain, decreased sensation, increased swelling, or an overall worsening of their medical condition, to please contact our office immediately.      Follow-up 1 week with 2 views right elbow.     All of the patient's questions/concerns address and they are in agreement with the plan.

## 2024-06-26 ENCOUNTER — HOSPITAL ENCOUNTER (OUTPATIENT)
Dept: RADIOLOGY | Facility: CLINIC | Age: 44
Discharge: HOME | End: 2024-06-26
Payer: COMMERCIAL

## 2024-06-26 ENCOUNTER — OFFICE VISIT (OUTPATIENT)
Dept: ORTHOPEDIC SURGERY | Facility: CLINIC | Age: 44
End: 2024-06-26
Payer: COMMERCIAL

## 2024-06-26 VITALS — BODY MASS INDEX: 44.16 KG/M2 | HEIGHT: 62 IN | WEIGHT: 240 LBS

## 2024-06-26 DIAGNOSIS — S52.121A DISP FX OF HEAD OF RIGHT RADIUS, INIT FOR CLOS FX: Primary | ICD-10-CM

## 2024-06-26 DIAGNOSIS — S52.121A DISP FX OF HEAD OF RIGHT RADIUS, INIT FOR CLOS FX: ICD-10-CM

## 2024-06-26 PROCEDURE — 73070 X-RAY EXAM OF ELBOW: CPT | Mod: RT

## 2024-06-26 PROCEDURE — 4010F ACE/ARB THERAPY RXD/TAKEN: CPT | Performed by: PHYSICIAN ASSISTANT

## 2024-06-26 PROCEDURE — 1036F TOBACCO NON-USER: CPT | Performed by: PHYSICIAN ASSISTANT

## 2024-06-26 PROCEDURE — 99024 POSTOP FOLLOW-UP VISIT: CPT | Performed by: PHYSICIAN ASSISTANT

## 2024-06-26 ASSESSMENT — PAIN DESCRIPTION - DESCRIPTORS: DESCRIPTORS: TIGHTNESS

## 2024-06-26 ASSESSMENT — PAIN - FUNCTIONAL ASSESSMENT: PAIN_FUNCTIONAL_ASSESSMENT: 0-10

## 2024-06-26 ASSESSMENT — PAIN SCALES - GENERAL: PAINLEVEL_OUTOF10: 3

## 2024-06-28 NOTE — PROGRESS NOTES
History of Present Illness   Siobhan Brooks is a 43 y.o. female presenting today for post-op check from R radial head arthroplasty on 6/10/24.  Continues to do well.  Pain is well-controlled and not needing narcotics for pain control.  She has not been able to get into therapy just yet and has been trying to do range of motion exercises herself at home.  No complaints or concerns at today's visit.     Past Medical History:   Diagnosis Date    Anxiety     Arthritis     Chronic headaches     Chronic pain disorder     NECK- BONE SPURS    DVT (deep venous thrombosis) (Multi)     Hypertension     MTHFR mutation     Pneumonia     Pulmonary embolism (Multi)     Type 2 diabetes mellitus (Multi)     Vertigo        Medication Documentation Review Audit       Reviewed by Jacinto Pepper LPN (Licensed Nurse) on 06/26/24 at 1150      Medication Order Taking? Sig Documenting Provider Last Dose Status   amitriptyline (Elavil) 25 mg tablet [id number] No Take 1 tablet (25 mg) by mouth once daily at bedtime. Annalisa Tinsley MD 6/9/2024 Active   aspirin 81 mg EC tablet [id number]  Take 1 tablet (81 mg) by mouth 2 times a day. Altagracia Villar PA-C  Active   aspirin-acetaminophen-caffeine (Excedrin Migraine) 250-250-65 mg tablet [id number] No Take 1 tablet by mouth every 6 hours if needed for headaches. Historical Provider, MD Past Month Active   buPROPion XL (Wellbutrin XL) 300 mg 24 hr tablet [id number] No Take 1 tablet (300 mg) by mouth once daily in the morning. Do not crush, chew, or split. Annalisa Tinsley MD 6/10/2024 Active   DULoxetine (Cymbalta) 30 mg DR capsule [id number] No Take 1 capsule (30 mg) by mouth once daily at bedtime. Annalisa Tinsley MD 6/9/2024 Active   ERGOCALCIFEROL, VITAMIN D2, ORAL [id number] No Take 1 tablet by mouth 1 (one) time per week. Annalisa Provider, MD Past Month Active   ibuprofen 600 mg tablet [id number] No Take 1 tablet (600 mg) by mouth every 8 hours if needed for mild pain (1 - 3)  or moderate pain (4 - 6). Take with food or snack Teresa Lincoln, APRN-CNP Past Month Active   lisinopril 10 mg tablet [id number] No Take 1 tablet (10 mg) by mouth once daily in the morning. Historical Provider, MD 6/9/2024 Active   metFORMIN (Glucophage) 500 mg tablet [id number] No Take 1 tablet (500 mg) by mouth 2 times daily (morning and late afternoon). Historical Provider, MD 6/9/2024 Active   pregabalin (Lyrica) 50 mg capsule [id number] No Take 1 capsule (50 mg) by mouth 2 times a day. Historical Provider, MD 6/9/2024 Active   sennosides-docusate sodium (Bonnie-Colace) 8.6-50 mg tablet [id number]  Take 1 tablet by mouth once daily for 21 days. Altagracia Villar PA-C  Active   tirzepatide (Mounjaro) 2.5 mg/0.5 mL pen injector [id number] No Inject 2.5 mg under the skin every 7 days. mondays Historical Provider, MD Past Week Active                    No Known Allergies    Social History     Socioeconomic History    Marital status:      Spouse name: Not on file    Number of children: Not on file    Years of education: Not on file    Highest education level: Not on file   Occupational History    Not on file   Tobacco Use    Smoking status: Never     Passive exposure: Past    Smokeless tobacco: Never   Vaping Use    Vaping status: Never Used   Substance and Sexual Activity    Alcohol use: Yes     Comment: 2-3/ MONTH    Drug use: Not Currently    Sexual activity: Defer   Other Topics Concern    Not on file   Social History Narrative    Not on file     Social Determinants of Health     Financial Resource Strain: Low Risk  (6/6/2024)    Received from [facility]    Overall Financial Resource Strain (CARDIA)     Difficulty of Paying Living Expenses: Not hard at all   Food Insecurity: No Food Insecurity (6/6/2024)    Received from [facility]    Hunger Vital Sign     Worried About Running Out of Food in the Last Year: Never true     Ran Out of Food in the Last Year: Never true    Transportation Needs: Unknown (2024)    Received from [facility]    PRAPARE - Transportation     Lack of Transportation (Medical): Not on file     Lack of Transportation (Non-Medical): No   Physical Activity: Not on file   Stress: Not on file   Social Connections: Not on file   Intimate Partner Violence: Not on file   Housing Stability: Unknown (2024)    Received from [facility]    Housing Stability Vital Sign     Unable to Pay for Housing in the Last Year: Not on file     Number of Places Lived in the Last Year: Not on file     Unstable Housing in the Last Year: No       Past Surgical History:   Procedure Laterality Date    ACHILLES TENDON SURGERY Right 2018     SECTION, CLASSIC      ,     KNEE SURGERY Left     MCL REPAIR          Review of Systems:  30 point ROS reviewed and negative other than as listed in the HPI     Physical Exam:  Gen: The pt is A&Ox3, NAD, and appear state age and weight  Psychiatric: mood and affect are appropriate   Eyes: sclera are white, EOM grossly intact  ENT: MMM  Neck: supple, thyroid is midline  Respiratory: respirations are nonlabored, chest rise symmetric  CV: rate is regular by palpation of distal pulses  Abdomen: nondistended   Integument: no obvious cutaneous lesions noted. No signs of lymphangitis. No signs of systemic edema.   MSK: right elbow surgical incision well healing without edema, erythema, drainage, or other s/sx of infection. Appropriately tender to palpation around the incision. SILT intact throughout axillary, radial, median, and ulnar nerve distributions. Hand warm and well perfused.     Imaging:  No imaging obtained today      Assessment   43 y.o. female post-op from R radial head arthroplasty on 6/10/24.     Plan:  Continue NWB on right upper extremity and to continue to have her range of motion restrictions.  She really needs to get in with therapy though to start working on her range  of motion.  I encouraged her to continue doing exercises herself as frequently as she can at home.  Her incision was well-healed; staples removed in the office today and wound care instructed. We will see her back in 4 weeks with 2 views right elbow.    All of the patient's questions/concerns address and they are in agreement with the plan.

## 2024-07-01 ENCOUNTER — OFFICE VISIT (OUTPATIENT)
Dept: FAMILY MEDICINE CLINIC | Age: 44
End: 2024-07-01
Payer: COMMERCIAL

## 2024-07-01 VITALS
DIASTOLIC BLOOD PRESSURE: 88 MMHG | OXYGEN SATURATION: 96 % | SYSTOLIC BLOOD PRESSURE: 126 MMHG | WEIGHT: 242 LBS | BODY MASS INDEX: 42.88 KG/M2 | HEIGHT: 63 IN | HEART RATE: 94 BPM | RESPIRATION RATE: 16 BRPM | TEMPERATURE: 98 F

## 2024-07-01 DIAGNOSIS — E66.01 MORBID OBESITY DUE TO EXCESS CALORIES (HCC): ICD-10-CM

## 2024-07-01 DIAGNOSIS — L71.9 ROSACEA: ICD-10-CM

## 2024-07-01 DIAGNOSIS — R74.01 TRANSAMINITIS: ICD-10-CM

## 2024-07-01 DIAGNOSIS — M48.02 CERVICAL STENOSIS OF SPINAL CANAL: ICD-10-CM

## 2024-07-01 DIAGNOSIS — I10 PRIMARY HYPERTENSION: ICD-10-CM

## 2024-07-01 DIAGNOSIS — E11.9 NEW ONSET TYPE 2 DIABETES MELLITUS (HCC): Primary | ICD-10-CM

## 2024-07-01 DIAGNOSIS — E88.819 INSULIN RESISTANCE: ICD-10-CM

## 2024-07-01 DIAGNOSIS — G47.00 INSOMNIA, UNSPECIFIED TYPE: ICD-10-CM

## 2024-07-01 DIAGNOSIS — M79.7 FIBROMYALGIA: ICD-10-CM

## 2024-07-01 DIAGNOSIS — J30.89 NON-SEASONAL ALLERGIC RHINITIS DUE TO OTHER ALLERGIC TRIGGER: ICD-10-CM

## 2024-07-01 DIAGNOSIS — F41.9 ANXIETY: ICD-10-CM

## 2024-07-01 LAB — HBA1C MFR BLD: 5.8 % (ref 4.3–5.7)

## 2024-07-01 PROCEDURE — 99215 OFFICE O/P EST HI 40 MIN: CPT | Performed by: NURSE PRACTITIONER

## 2024-07-01 PROCEDURE — 3074F SYST BP LT 130 MM HG: CPT | Performed by: NURSE PRACTITIONER

## 2024-07-01 PROCEDURE — 1036F TOBACCO NON-USER: CPT | Performed by: NURSE PRACTITIONER

## 2024-07-01 PROCEDURE — G8417 CALC BMI ABV UP PARAM F/U: HCPCS | Performed by: NURSE PRACTITIONER

## 2024-07-01 PROCEDURE — G8427 DOCREV CUR MEDS BY ELIG CLIN: HCPCS | Performed by: NURSE PRACTITIONER

## 2024-07-01 PROCEDURE — 3044F HG A1C LEVEL LT 7.0%: CPT | Performed by: NURSE PRACTITIONER

## 2024-07-01 PROCEDURE — 2022F DILAT RTA XM EVC RTNOPTHY: CPT | Performed by: NURSE PRACTITIONER

## 2024-07-01 PROCEDURE — 3079F DIAST BP 80-89 MM HG: CPT | Performed by: NURSE PRACTITIONER

## 2024-07-01 RX ORDER — BUPROPION HYDROCHLORIDE 150 MG/1
150 TABLET ORAL EVERY MORNING
Qty: 90 TABLET | Refills: 4 | Status: SHIPPED | OUTPATIENT
Start: 2024-07-01

## 2024-07-01 RX ORDER — BUPROPION HYDROCHLORIDE 300 MG/1
300 TABLET ORAL EVERY MORNING
Qty: 90 TABLET | Refills: 4 | Status: SHIPPED | OUTPATIENT
Start: 2024-07-01

## 2024-07-01 RX ORDER — DULOXETIN HYDROCHLORIDE 30 MG/1
30 CAPSULE, DELAYED RELEASE ORAL DAILY
Qty: 90 CAPSULE | Refills: 4 | Status: SHIPPED | OUTPATIENT
Start: 2024-07-01

## 2024-07-01 RX ORDER — AMITRIPTYLINE HYDROCHLORIDE 25 MG/1
25 TABLET, FILM COATED ORAL NIGHTLY
Qty: 90 TABLET | Refills: 4 | Status: SHIPPED | OUTPATIENT
Start: 2024-07-01

## 2024-07-01 ASSESSMENT — ENCOUNTER SYMPTOMS: RESPIRATORY NEGATIVE: 1

## 2024-07-01 NOTE — PROGRESS NOTES
effects of prescribedmedications.  All patient questions answered.  Pt voiced understanding. Reviewedhealth maintenance.  Instructed to continue current medications, diet and exercise.Patient agreed with treatment plan. Follow up as directed.       Electronicallysigned by REECE Galarza CNP on 7/1/2024 at 9:03 AM

## 2024-07-03 ENCOUNTER — PATIENT MESSAGE (OUTPATIENT)
Dept: ORTHOPEDIC SURGERY | Facility: CLINIC | Age: 44
End: 2024-07-03
Payer: COMMERCIAL

## 2024-07-03 DIAGNOSIS — S52.121A DISP FX OF HEAD OF RIGHT RADIUS, INIT FOR CLOS FX: Primary | ICD-10-CM

## 2024-07-10 RX ORDER — ERGOCALCIFEROL 1.25 MG/1
50000 CAPSULE ORAL WEEKLY
Qty: 12 CAPSULE | Refills: 0 | Status: SHIPPED | OUTPATIENT
Start: 2024-07-10

## 2024-07-10 NOTE — TELEPHONE ENCOUNTER
Recent Visits  Date Type Provider Dept   07/01/24 Office Visit Arcadio Chowdhury APRN - CNP [facility]   06/06/24 Office Visit Luciana Arcadio, APRN - CNP [facility]   03/28/24 Office Visit Luciana Arcadio, APRN - CNP [facility]   02/26/24 Office Visit Luciana Arcadio, APRN - CNP [facility]   04/18/23 Office Visit Arcadio Chowdhury APRN - CNP [facility]   Showing recent visits within past 540 days with a meds authorizing provider and meeting all other requirements  Future Appointments  Date Type Provider Dept   10/03/24 Appointment Arcadio Chowdhury APRN - CNP [facility]   Showing future appointments within next 150 days with a meds authorizing provider and meeting all other requirements

## 2024-07-30 ENCOUNTER — PATIENT MESSAGE (OUTPATIENT)
Dept: FAMILY MEDICINE CLINIC | Age: 44
End: 2024-07-30

## 2024-07-30 NOTE — TELEPHONE ENCOUNTER
From: Shivani Osorio  To: Arcadio Chowdhury  Sent: 7/30/2024 11:14 AM EDT  Subject: New patient    Hi, I was looking to see if I could get my youngest daughter in as a new patient? She needs a vaccination and sports physical

## 2024-07-31 ENCOUNTER — OFFICE VISIT (OUTPATIENT)
Dept: ORTHOPEDIC SURGERY | Facility: CLINIC | Age: 44
End: 2024-07-31
Payer: COMMERCIAL

## 2024-07-31 ENCOUNTER — HOSPITAL ENCOUNTER (OUTPATIENT)
Dept: RADIOLOGY | Facility: CLINIC | Age: 44
Discharge: HOME | End: 2024-07-31
Payer: COMMERCIAL

## 2024-07-31 DIAGNOSIS — S52.124A CLOSED NONDISPLACED FRACTURE OF HEAD OF RIGHT RADIUS, INITIAL ENCOUNTER: Primary | ICD-10-CM

## 2024-07-31 DIAGNOSIS — S52.124A CLOSED NONDISPLACED FRACTURE OF HEAD OF RIGHT RADIUS, INITIAL ENCOUNTER: ICD-10-CM

## 2024-07-31 PROCEDURE — 4010F ACE/ARB THERAPY RXD/TAKEN: CPT | Performed by: PHYSICIAN ASSISTANT

## 2024-07-31 PROCEDURE — 1036F TOBACCO NON-USER: CPT | Performed by: PHYSICIAN ASSISTANT

## 2024-07-31 PROCEDURE — 99211 OFF/OP EST MAY X REQ PHY/QHP: CPT | Performed by: PHYSICIAN ASSISTANT

## 2024-07-31 PROCEDURE — 73070 X-RAY EXAM OF ELBOW: CPT | Mod: RT

## 2024-07-31 PROCEDURE — 73070 X-RAY EXAM OF ELBOW: CPT | Mod: RIGHT SIDE | Performed by: STUDENT IN AN ORGANIZED HEALTH CARE EDUCATION/TRAINING PROGRAM

## 2024-07-31 ASSESSMENT — PAIN - FUNCTIONAL ASSESSMENT: PAIN_FUNCTIONAL_ASSESSMENT: 0-10

## 2024-07-31 NOTE — PROGRESS NOTES
History of Present Illness   Siobhan Brooks is a 43 y.o. female presenting today for post-op check from R radial head arthroplasty on 6/10/24.  Continues to do well. Working with therapy and happy with the progress she has made with there ROM. No other complaints or concerns.      Past Medical History:   Diagnosis Date    Anxiety     Arthritis     Chronic headaches     Chronic pain disorder     NECK- BONE SPURS    DVT (deep venous thrombosis) (Multi)     Hypertension     MTHFR mutation     Pneumonia     Pulmonary embolism (Multi)     Type 2 diabetes mellitus (Multi)     Vertigo        Medication Documentation Review Audit       Reviewed by Ligia Robb MA (Medical Assistant) on 07/31/24 at 1100      Medication Order Taking? Sig Documenting Provider Last Dose Status   amitriptyline (Elavil) 25 mg tablet [id number] No Take 1 tablet (25 mg) by mouth once daily at bedtime. Annalisa Tinsley MD 6/9/2024 Active   aspirin-acetaminophen-caffeine (Excedrin Migraine) 250-250-65 mg tablet [id number] No Take 1 tablet by mouth every 6 hours if needed for headaches. Annalisa Tinsley MD Past Month Active   buPROPion XL (Wellbutrin XL) 300 mg 24 hr tablet [id number] No Take 1 tablet (300 mg) by mouth once daily in the morning. Do not crush, chew, or split. Annalisa Tinsley MD 6/10/2024 Active   DULoxetine (Cymbalta) 30 mg DR capsule [id number] No Take 1 capsule (30 mg) by mouth once daily at bedtime. Annalisa Tinsley MD 6/9/2024 Active   ERGOCALCIFEROL, VITAMIN D2, ORAL [id number] No Take 1 tablet by mouth 1 (one) time per week. Annalisa Tinsley MD Past Month Active   ibuprofen 600 mg tablet [id number] No Take 1 tablet (600 mg) by mouth every 8 hours if needed for mild pain (1 - 3) or moderate pain (4 - 6). Take with food or snack Teresa Lincoln, APRN-CNP Past Month Active   lisinopril 10 mg tablet [id number] No Take 1 tablet (10 mg) by mouth once daily in the morning. Annalisa Tinsley MD 6/9/2024 Active    metFORMIN (Glucophage) 500 mg tablet [id number] No Take 1 tablet (500 mg) by mouth 2 times daily (morning and late afternoon). Historical Provider, MD 6/9/2024 Active   pregabalin (Lyrica) 50 mg capsule [id number] No Take 1 capsule (50 mg) by mouth 2 times a day. Historical Provider, MD 6/9/2024 Active   tirzepatide (Mounjaro) 2.5 mg/0.5 mL pen injector [id number] No Inject 2.5 mg under the skin every 7 days. mondays Historical Provider, MD Past Week Active                    No Known Allergies    Social History     Socioeconomic History    Marital status:      Spouse name: Not on file    Number of children: Not on file    Years of education: Not on file    Highest education level: Not on file   Occupational History    Not on file   Tobacco Use    Smoking status: Never     Passive exposure: Past    Smokeless tobacco: Never   Vaping Use    Vaping status: Never Used   Substance and Sexual Activity    Alcohol use: Yes     Comment: 2-3/ MONTH    Drug use: Not Currently    Sexual activity: Defer   Other Topics Concern    Not on file   Social History Narrative    Not on file     Social Determinants of Health     Financial Resource Strain: Low Risk  (6/6/2024)    Received from [facility]    Overall Financial Resource Strain (CARDIA)     Difficulty of Paying Living Expenses: Not hard at all   Food Insecurity: No Food Insecurity (6/6/2024)    Received from [facility]    Hunger Vital Sign     Worried About Running Out of Food in the Last Year: Never true     Ran Out of Food in the Last Year: Never true   Transportation Needs: Unknown (6/6/2024)    Received from [facility]    PRAPARE - Transportation     Lack of Transportation (Medical): Not on file     Lack of Transportation (Non-Medical): No   Physical Activity: Not on file   Stress: Not on file    Social Connections: Not on file   Intimate Partner Violence: Not on file   Housing Stability: Unknown (2024)    Received from [facility]    Housing Stability Vital Sign     Unable to Pay for Housing in the Last Year: Not on file     Number of Places Lived in the Last Year: Not on file     In the last 12 months, was there a time when you did not have a steady place to sleep or slept in a shelter (including now)?: No       Past Surgical History:   Procedure Laterality Date    ACHILLES TENDON SURGERY Right 2018     SECTION, CLASSIC      ,     KNEE SURGERY Left     MCL REPAIR          Review of Systems:  30 point ROS reviewed and negative other than as listed in the HPI     Physical Exam:  Gen: The pt is A&Ox3, NAD, and appear state age and weight  Psychiatric: mood and affect are appropriate   Eyes: sclera are white, EOM grossly intact  ENT: MMM  Neck: supple, thyroid is midline  Respiratory: respirations are nonlabored, chest rise symmetric  CV: rate is regular by palpation of distal pulses  Abdomen: nondistended   Integument: no obvious cutaneous lesions noted. No signs of lymphangitis. No signs of systemic edema.   MSK: right elbow surgical incision well healing without edema, erythema, drainage, or other s/sx of infection. Appropriately tender to palpation around the incision. SILT intact throughout axillary, radial, median, and ulnar nerve distributions. Hand warm and well perfused.     Imaging:  I personally reviewed multiple images of the R elbow which shows stable implant.      Assessment   43 y.o. female post-op from R radial head arthroplasty on 6/10/24.     Plan:  Can WB up to 5lbs on right upper extremity and advance to ROM as tolerated. Continue therapy and focus on ROM.      We will see her back in 6 weeks with 2 views right elbow.    All of the patient's questions/concerns address and they are in agreement with the plan.

## 2024-07-31 NOTE — LETTER
July 31, 2024     Patient: Siobhan Brooks   YOB: 1980   Date of Visit: 7/31/2024       To Whom It May Concern:    It is my medical opinion that Siobhan Brooks should remain out of work until 9/11/24. She is not clear to light anything heavier than 5lbs at this time. Siobhan will be seen in the office again on 9/11/24 to determine timing to return to work .    If you have any questions or concerns, please don't hesitate to call the office at [phone].         Sincerely,        Altagracia Villar PA-C    CC: No Recipients

## 2024-08-28 DIAGNOSIS — M79.7 FIBROMYALGIA: ICD-10-CM

## 2024-08-29 RX ORDER — DULOXETIN HYDROCHLORIDE 30 MG/1
30 CAPSULE, DELAYED RELEASE ORAL DAILY
Qty: 90 CAPSULE | Refills: 4 | Status: SHIPPED | OUTPATIENT
Start: 2024-08-29

## 2024-09-11 ENCOUNTER — HOSPITAL ENCOUNTER (OUTPATIENT)
Dept: RADIOLOGY | Facility: CLINIC | Age: 44
Discharge: HOME | End: 2024-09-11
Payer: COMMERCIAL

## 2024-09-11 ENCOUNTER — OFFICE VISIT (OUTPATIENT)
Dept: ORTHOPEDIC SURGERY | Facility: CLINIC | Age: 44
End: 2024-09-11
Payer: COMMERCIAL

## 2024-09-11 DIAGNOSIS — S52.124A CLOSED NONDISPLACED FRACTURE OF HEAD OF RIGHT RADIUS, INITIAL ENCOUNTER: ICD-10-CM

## 2024-09-11 DIAGNOSIS — S52.124A CLOSED NONDISPLACED FRACTURE OF HEAD OF RIGHT RADIUS, INITIAL ENCOUNTER: Primary | ICD-10-CM

## 2024-09-11 PROCEDURE — 73070 X-RAY EXAM OF ELBOW: CPT | Mod: RT

## 2024-09-11 PROCEDURE — 99213 OFFICE O/P EST LOW 20 MIN: CPT | Performed by: PHYSICIAN ASSISTANT

## 2024-09-11 PROCEDURE — 1036F TOBACCO NON-USER: CPT | Performed by: PHYSICIAN ASSISTANT

## 2024-09-11 PROCEDURE — 73070 X-RAY EXAM OF ELBOW: CPT | Mod: RIGHT SIDE | Performed by: RADIOLOGY

## 2024-09-11 PROCEDURE — 4010F ACE/ARB THERAPY RXD/TAKEN: CPT | Performed by: PHYSICIAN ASSISTANT

## 2024-09-11 ASSESSMENT — COLUMBIA-SUICIDE SEVERITY RATING SCALE - C-SSRS
2. HAVE YOU ACTUALLY HAD ANY THOUGHTS OF KILLING YOURSELF?: NO
1. IN THE PAST MONTH, HAVE YOU WISHED YOU WERE DEAD OR WISHED YOU COULD GO TO SLEEP AND NOT WAKE UP?: NO
6. HAVE YOU EVER DONE ANYTHING, STARTED TO DO ANYTHING, OR PREPARED TO DO ANYTHING TO END YOUR LIFE?: NO

## 2024-09-11 ASSESSMENT — PAIN - FUNCTIONAL ASSESSMENT: PAIN_FUNCTIONAL_ASSESSMENT: 0-10

## 2024-09-11 ASSESSMENT — PAIN DESCRIPTION - DESCRIPTORS: DESCRIPTORS: ACHING

## 2024-09-11 ASSESSMENT — PAIN SCALES - GENERAL: PAINLEVEL_OUTOF10: 2

## 2024-09-11 NOTE — LETTER
September 11, 2024     Patient: Siobhan Brooks   YOB: 1980   Date of Visit: 9/11/2024       To Whom It May Concern:    It is my medical opinion that Siobhan Brooks may return to work on 9/16/24 without restrictions .    If you have any questions or concerns, please don't hesitate to call.         Sincerely,        Altagracia Villar PA-C    CC: No Recipients

## 2024-09-11 NOTE — PROGRESS NOTES
History of Present Illness   Siobhan Brooks is a 43 y.o. female presenting today for post-op check from R radial head arthroplasty on 6/10/24. Continues to work with therapy and feels her motion continues to improve. Minimal complaints or pain noticed mostly with extremes of her motion. No other complaints or concerns.      Past Medical History:   Diagnosis Date    Anxiety     Arthritis     Chronic headaches     Chronic pain disorder     NECK- BONE SPURS    DVT (deep venous thrombosis) (Multi)     Hypertension     MTHFR mutation     Pneumonia     Pulmonary embolism (Multi)     Type 2 diabetes mellitus (Multi)     Vertigo        Medication Documentation Review Audit       Reviewed by Annalise Carlos MA (Medical Assistant) on 09/11/24 at 1055      Medication Order Taking? Sig Documenting Provider Last Dose Status   amitriptyline (Elavil) 25 mg tablet [id number] No Take 1 tablet (25 mg) by mouth once daily at bedtime. Annalisa Tinsley MD 6/9/2024 Active   aspirin-acetaminophen-caffeine (Excedrin Migraine) 250-250-65 mg tablet [id number] No Take 1 tablet by mouth every 6 hours if needed for headaches. Historical Provider, MD Past Month Active   buPROPion XL (Wellbutrin XL) 300 mg 24 hr tablet [id number] No Take 1 tablet (300 mg) by mouth once daily in the morning. Do not crush, chew, or split. Annalisa Provider, MD 6/10/2024 Active   DULoxetine (Cymbalta) 30 mg DR capsule [id number] No Take 1 capsule (30 mg) by mouth once daily at bedtime. Annalisa Tinsley MD 6/9/2024 Active   ERGOCALCIFEROL, VITAMIN D2, ORAL [id number] No Take 1 tablet by mouth 1 (one) time per week. Historical Provider, MD Past Month Active   ibuprofen 600 mg tablet [id number] No Take 1 tablet (600 mg) by mouth every 8 hours if needed for mild pain (1 - 3) or moderate pain (4 - 6). Take with food or snack Teresa Lincoln, APRN-CNP Past Month Active   lisinopril 10 mg tablet [id number] No Take 1 tablet (10 mg) by mouth once daily in the  morning. Annalisa Tinsley MD 6/9/2024 Active   metFORMIN (Glucophage) 500 mg tablet [id number] No Take 1 tablet (500 mg) by mouth 2 times daily (morning and late afternoon). Annalisa Tinsley MD 6/9/2024 Active   pregabalin (Lyrica) 50 mg capsule [id number] No Take 1 capsule (50 mg) by mouth 2 times a day. Annalisa Tinsley MD 6/9/2024 Active   tirzepatide (Mounjaro) 2.5 mg/0.5 mL pen injector [id number] No Inject 2.5 mg under the skin every 7 days. mondays Historical Provider, MD Past Week Active                    No Known Allergies    Social History     Socioeconomic History    Marital status:      Spouse name: Not on file    Number of children: Not on file    Years of education: Not on file    Highest education level: Not on file   Occupational History    Not on file   Tobacco Use    Smoking status: Never     Passive exposure: Past    Smokeless tobacco: Never   Vaping Use    Vaping status: Never Used   Substance and Sexual Activity    Alcohol use: Yes     Comment: 2-3/ MONTH    Drug use: Not Currently    Sexual activity: Defer   Other Topics Concern    Not on file   Social History Narrative    Not on file     Social Determinants of Health     Financial Resource Strain: Low Risk  (6/6/2024)    Received [facility]    Overall Financial Resource Strain (CARDIA)     Difficulty of Paying Living Expenses: Not hard at all   Food Insecurity: No Food Insecurity (6/6/2024)    Received from [facility]    Hunger Vital Sign     Worried About Running Out of Food in the Last Year: Never true     Ran Out of Food in the Last Year: Never true   Transportation Needs: Unknown (6/6/2024)    Received from [facility]    PRAPARE - Transportation     Lack of Transportation (Medical): Not on file     Lack of Transportation (Non-Medical): No    Physical Activity: Not on file   Stress: Not on file   Social Connections: Not on file   Intimate Partner Violence: Not on file   Housing Stability: Unknown (2024)    Received from [facility]    Housing Stability Vital Sign     Unable to Pay for Housing in the Last Year: Not on file     Number of Places Lived in the Last Year: Not on file     Unstable Housing in the Last Year: No       Past Surgical History:   Procedure Laterality Date    ACHILLES TENDON SURGERY Right 2018     SECTION, CLASSIC      ,     KNEE SURGERY Left     MCL REPAIR          Review of Systems:  30 point ROS reviewed and negative other than as listed in the HPI     Physical Exam:  Gen: The pt is A&Ox3, NAD, and appear state age and weight  Psychiatric: mood and affect are appropriate   Eyes: sclera are white, EOM grossly intact  ENT: MMM  Neck: supple, thyroid is midline  Respiratory: respirations are nonlabored, chest rise symmetric  CV: rate is regular by palpation of distal pulses  Abdomen: nondistended   Integument: no obvious cutaneous lesions noted. No signs of lymphangitis. No signs of systemic edema.   MSK: right elbow surgical incision well healing without edema, erythema, drainage, or other s/sx of infection. Appropriately tender to palpation around the incision. SILT intact throughout axillary, radial, median, and ulnar nerve distributions. Hand warm and well perfused.     Imaging:  I personally reviewed multiple images of the R elbow which shows stable implant.      Assessment   43 y.o. female post-op from R radial head arthroplasty on 6/10/24.     Plan:  Can WBAT on the RUE and continue ROM as tolerated. We discussed a stretching brace and she will talk to her therapist to see if they think she would benefit from this as well.     We will see her back in 6 months with 2 views right elbow.    All of the patient's questions/concerns address and they are  in agreement with the plan.

## 2025-01-05 DIAGNOSIS — M79.7 FIBROMYALGIA: ICD-10-CM

## 2025-01-05 DIAGNOSIS — I10 ESSENTIAL HYPERTENSION: ICD-10-CM

## 2025-01-05 DIAGNOSIS — G47.00 INSOMNIA, UNSPECIFIED TYPE: ICD-10-CM

## 2025-01-05 DIAGNOSIS — F41.9 ANXIETY: ICD-10-CM

## 2025-01-05 DIAGNOSIS — E66.01 MORBID OBESITY DUE TO EXCESS CALORIES: ICD-10-CM

## 2025-01-06 RX ORDER — LISINOPRIL 10 MG/1
10 TABLET ORAL DAILY
Qty: 90 TABLET | Refills: 4 | Status: SHIPPED | OUTPATIENT
Start: 2025-01-06

## 2025-01-06 RX ORDER — DULOXETIN HYDROCHLORIDE 30 MG/1
30 CAPSULE, DELAYED RELEASE ORAL DAILY
Qty: 90 CAPSULE | Refills: 4 | Status: SHIPPED | OUTPATIENT
Start: 2025-01-06 | End: 2025-01-09 | Stop reason: SDUPTHER

## 2025-01-06 RX ORDER — BUPROPION HYDROCHLORIDE 300 MG/1
300 TABLET ORAL EVERY MORNING
Qty: 90 TABLET | Refills: 4 | Status: SHIPPED | OUTPATIENT
Start: 2025-01-06

## 2025-01-06 RX ORDER — BUPROPION HYDROCHLORIDE 150 MG/1
150 TABLET ORAL EVERY MORNING
Qty: 90 TABLET | Refills: 4 | Status: SHIPPED | OUTPATIENT
Start: 2025-01-06

## 2025-01-06 NOTE — TELEPHONE ENCOUNTER
Recent Visits  Date Type Provider Dept   07/01/24 Office Visit Arcadio Chowdhury APRN - CNP [facility]   06/06/24 Office Visit Arcadio Chowdhury APRN - CNP [facility]   03/28/24 Office Visit Arcadio Chowdhury APRN - CNP [facility]   02/26/24 Office Visit Arcadio Chowdhury APRN - CNP [facility]   Showing recent visits within past 540 days with a meds authorizing provider and meeting all other requirements  Future Appointments  Date Type Provider Dept   01/09/25 Appointment Arcadio Chowdhury APRN - CNP [facility]   Showing future appointments within next 150 days with a meds authorizing provider and meeting all other requirements

## 2025-01-09 ENCOUNTER — TELEMEDICINE (OUTPATIENT)
Dept: FAMILY MEDICINE CLINIC | Age: 45
End: 2025-01-09
Payer: COMMERCIAL

## 2025-01-09 DIAGNOSIS — F41.0 PANIC ATTACK AS REACTION TO STRESS: ICD-10-CM

## 2025-01-09 DIAGNOSIS — F32.1 CURRENT MODERATE EPISODE OF MAJOR DEPRESSIVE DISORDER, UNSPECIFIED WHETHER RECURRENT (HCC): Primary | ICD-10-CM

## 2025-01-09 DIAGNOSIS — M79.7 FIBROMYALGIA: ICD-10-CM

## 2025-01-09 DIAGNOSIS — I10 PRIMARY HYPERTENSION: ICD-10-CM

## 2025-01-09 DIAGNOSIS — F43.0 PANIC ATTACK AS REACTION TO STRESS: ICD-10-CM

## 2025-01-09 DIAGNOSIS — F41.9 ANXIETY: ICD-10-CM

## 2025-01-09 DIAGNOSIS — E11.9 NEW ONSET TYPE 2 DIABETES MELLITUS (HCC): ICD-10-CM

## 2025-01-09 PROCEDURE — 3046F HEMOGLOBIN A1C LEVEL >9.0%: CPT | Performed by: NURSE PRACTITIONER

## 2025-01-09 PROCEDURE — G8428 CUR MEDS NOT DOCUMENT: HCPCS | Performed by: NURSE PRACTITIONER

## 2025-01-09 PROCEDURE — 99214 OFFICE O/P EST MOD 30 MIN: CPT | Performed by: NURSE PRACTITIONER

## 2025-01-09 PROCEDURE — 2022F DILAT RTA XM EVC RTNOPTHY: CPT | Performed by: NURSE PRACTITIONER

## 2025-01-09 RX ORDER — HYDROXYZINE PAMOATE 25 MG/1
25 CAPSULE ORAL 3 TIMES DAILY PRN
Qty: 30 CAPSULE | Refills: 1 | Status: SHIPPED | OUTPATIENT
Start: 2025-01-09 | End: 2025-02-08

## 2025-01-09 RX ORDER — DULOXETIN HYDROCHLORIDE 60 MG/1
60 CAPSULE, DELAYED RELEASE ORAL DAILY
Qty: 90 CAPSULE | Refills: 1 | Status: SHIPPED | OUTPATIENT
Start: 2025-01-09

## 2025-01-09 ASSESSMENT — ENCOUNTER SYMPTOMS: RESPIRATORY NEGATIVE: 1

## 2025-01-09 NOTE — PROGRESS NOTES
MG capsule; Take 1 capsule by mouth 3 times daily as needed for Anxiety  Dispense: 30 capsule; Refill: 1  - Brianna Marie, PhD, Psychology, [place]  - DULoxetine (CYMBALTA) 60 MG extended release capsule; Take 1 capsule by mouth daily  Dispense: 90 capsule; Refill: 1    4. Fibromyalgia  Controlled   - DULoxetine (CYMBALTA) 60 MG extended release capsule; Take 1 capsule by mouth daily  Dispense: 90 capsule; Refill: 1    5. New onset type 2 diabetes mellitus (HCC)  Stable  Continue metformin 500 mg bid  Can't tolerate GLP-1 due to SE but AM FBS in range   Low carb low sugar diet     6. Primary hypertension  Controlled continue current meds    Pt miranda greement with plan   Return in about 4 weeks (around 2/6/2025) for f/u anxiety .    Shivani Osorio, was evaluated through a synchronous (real-time) audio-video encounter. The patient (or guardian if applicable) is aware that this is a billable service, which includes applicable co-pays. This Virtual Visit was conducted with patient's (and/or legal guardian's) consent. Patient identification was verified, and a caregiver was present when appropriate.   The patient was located at Home: [address] [place] [id number]  Provider was located at Facility (Appt Dept): [address] [place] [address] [place] [address]  Confirm you are appropriately licensed, registered, or certified to deliver care in the state where the patient is located as indicated above. If you are not or unsure, please re-schedule the visit: Yes, I confirm.       Total time spent on this encounter: Not billed by time    --Arcadio Chowdhury, REECE - CNP on 1/9/2025 at 9:39 AM    An electronic signature was used to authenticate this note.

## 2025-02-06 ENCOUNTER — TELEPHONE (OUTPATIENT)
Dept: PSYCHOLOGY | Age: 45
End: 2025-02-06

## 2025-02-06 NOTE — TELEPHONE ENCOUNTER
Patient NS/NC for scheduled appt.  Reached out to patient by phone.  No answer.  Left message encouraging patient to call to reschedule.

## 2025-03-04 ENCOUNTER — PATIENT MESSAGE (OUTPATIENT)
Dept: FAMILY MEDICINE CLINIC | Age: 45
End: 2025-03-04

## 2025-03-04 RX ORDER — ONDANSETRON 4 MG/1
4 TABLET, FILM COATED ORAL EVERY 8 HOURS PRN
Qty: 20 TABLET | Refills: 0 | Status: SHIPPED | OUTPATIENT
Start: 2025-03-04 | End: 2025-03-07

## 2025-03-04 NOTE — TELEPHONE ENCOUNTER
Recent Visits  Date Type Provider Dept   07/01/24 Office Visit LucianaArcadio, APRN - CNP [facility]   06/06/24 Office Visit LucianaArcadio, APRN - CNP [facility]   03/28/24 Office Visit LucianaArcadio, APRN - CNP [facility]   02/26/24 Office Visit LucianaArcadio, APRN - CNP [facility]   Showing recent visits within past 540 days with a meds authorizing provider and meeting all other requirements  Future Appointments  No visits were found meeting these conditions.  Showing future appointments within next 150 days with a meds authorizing provider and meeting all other requirements

## 2025-03-12 ENCOUNTER — HOSPITAL ENCOUNTER (OUTPATIENT)
Dept: RADIOLOGY | Facility: CLINIC | Age: 45
Discharge: HOME | End: 2025-03-12
Payer: COMMERCIAL

## 2025-03-12 ENCOUNTER — OFFICE VISIT (OUTPATIENT)
Dept: ORTHOPEDIC SURGERY | Facility: CLINIC | Age: 45
End: 2025-03-12
Payer: COMMERCIAL

## 2025-03-12 DIAGNOSIS — S52.124A CLOSED NONDISPLACED FRACTURE OF HEAD OF RIGHT RADIUS, INITIAL ENCOUNTER: ICD-10-CM

## 2025-03-12 DIAGNOSIS — S52.124A CLOSED NONDISPLACED FRACTURE OF HEAD OF RIGHT RADIUS, INITIAL ENCOUNTER: Primary | ICD-10-CM

## 2025-03-12 PROCEDURE — 99214 OFFICE O/P EST MOD 30 MIN: CPT | Performed by: ORTHOPAEDIC SURGERY

## 2025-03-12 PROCEDURE — 73070 X-RAY EXAM OF ELBOW: CPT | Mod: RIGHT SIDE | Performed by: RADIOLOGY

## 2025-03-12 PROCEDURE — 73070 X-RAY EXAM OF ELBOW: CPT | Mod: RT

## 2025-03-12 PROCEDURE — 4010F ACE/ARB THERAPY RXD/TAKEN: CPT | Performed by: ORTHOPAEDIC SURGERY

## 2025-03-12 PROCEDURE — 1036F TOBACCO NON-USER: CPT | Performed by: ORTHOPAEDIC SURGERY

## 2025-03-12 NOTE — PROGRESS NOTES
History of Present Illness   Siobhan Brooks is a 44 y.o. female presenting today for post-op check from R radial head arthroplasty on 6/10/24.  She is not doing therapy currently.  She was doing very well and suffered an additional fall in January.  Unfortunately this set her back significantly.  She was on a pretty quick recovery path and then this derailed her somewhat.  She presents with her significant other.  She has not been in physical therapy for quite some time.  She is not currently at the state she was prior to the fall in January.  She still has issues with stiffness and pain with motion.    Past Medical History:   Diagnosis Date    Anxiety     Arthritis     Chronic headaches     Chronic pain disorder     NECK- BONE SPURS    DVT (deep venous thrombosis) (Multi)     Hypertension     MTHFR mutation     Pneumonia     Pulmonary embolism     Type 2 diabetes mellitus     Vertigo        Medication Documentation Review Audit       Reviewed by Caden Cardoza MD (Physician) on 03/12/25 at 1146      Medication Order Taking? Sig Documenting Provider Last Dose Status   amitriptyline (Elavil) 25 mg tablet [id number] Yes Take 1 tablet (25 mg) by mouth once daily at bedtime. Historical Provider, MD  Active   aspirin-acetaminophen-caffeine (Excedrin Migraine) 250-250-65 mg tablet [id number] Yes Take 1 tablet by mouth every 6 hours if needed for headaches. Historical Provider, MD  Active   buPROPion XL (Wellbutrin XL) 300 mg 24 hr tablet [id number] Yes Take 1 tablet (300 mg) by mouth once daily in the morning. Do not crush, chew, or split. Historical Provider, MD  Active   DULoxetine (Cymbalta) 30 mg DR capsule [id number] Yes Take 1 capsule (30 mg) by mouth once daily at bedtime. Historical Provider, MD  Active   ERGOCALCIFEROL, VITAMIN D2, ORAL [id number] Yes Take 1 tablet by mouth 1 (one) time per week. Historical Provider, MD  Active   ibuprofen 600 mg tablet [id number] Yes Take 1 tablet (600 mg) by mouth every 8 hours  if needed for mild pain (1 - 3) or moderate pain (4 - 6). Take with food or snack Teresa Lincoln, APRN-CNP  Active   lisinopril 10 mg tablet [id number] Yes Take 1 tablet (10 mg) by mouth once daily in the morning. Historical Provider, MD  Active   metFORMIN (Glucophage) 500 mg tablet [id number] Yes Take 1 tablet (500 mg) by mouth 2 times daily (morning and late afternoon). Historical Provider, MD  Active   pregabalin (Lyrica) 50 mg capsule [id number] Yes Take 1 capsule (50 mg) by mouth 2 times a day. Historical Provider, MD  Active   tirzepatide (Mounjaro) 2.5 mg/0.5 mL pen injector [id number] Yes Inject 2.5 mg under the skin every 7 days. mondays Historical Provider, MD  Active                    No Known Allergies    Social History     Socioeconomic History    Marital status:      Spouse name: Not on file    Number of children: Not on file    Years of education: Not on file    Highest education level: Not on file   Occupational History    Not on file   Tobacco Use    Smoking status: Never     Passive exposure: Past    Smokeless tobacco: Never   Vaping Use    Vaping status: Never Used   Substance and Sexual Activity    Alcohol use: Yes     Comment: 2-3/ MONTH    Drug use: Not Currently    Sexual activity: Defer   Other Topics Concern    Not on file   Social History Narrative    Not on file     Social Drivers of Health     Financial Resource Strain: Low Risk  (6/6/2024)    Received [facility]    Overall Financial Resource Strain (CARDIA)     Difficulty of Paying Living Expenses: Not hard at all   Food Insecurity: No Food Insecurity (6/6/2024)    Received from [facility]    Hunger Vital Sign     Worried About Running Out of Food in the Last Year: Never true     Ran Out of Food in the Last Year: Never true   Transportation Needs: Unknown (6/6/2024)    Received from [facility]    PRAPARE - Transportation     Lack of Transportation (Medical): Not on file     Lack of Transportation (Non-Medical): No   Physical Activity: Not on file   Stress: Not on file   Social Connections: Not on file   Intimate Partner Violence: Not on file   Housing Stability: Unknown (2024)    Received from [facility]    Housing Stability Vital Sign     Unable to Pay for Housing in the Last Year: Not on file     Number of Places Lived in the Last Year: Not on file     Unstable Housing in the Last Year: No       Past Surgical History:   Procedure Laterality Date    ACHILLES TENDON SURGERY Right 2018     SECTION, CLASSIC      ,     KNEE SURGERY Left     MCL REPAIR      Physical Exam:  Gen: The pt is A&Ox3, NAD, and appear state age and weight  Psychiatric: mood and affect are appropriate   Eyes: sclera are white, EOM grossly intact  ENT: MMM  Neck: supple, thyroid is midline  Respiratory: respirations are nonlabored, chest rise symmetric  CV: rate is regular by palpation of distal pulses  Abdomen: nondistended   Integument: no obvious cutaneous lesions noted. No signs of lymphangitis. No signs of systemic edema.   MSK: right elbow surgical incision well healing without edema, erythema, drainage, or other s/sx of infection. Appropriately tender to palpation around the incision. SILT intact throughout axillary, radial, median, and ulnar nerve distributions. Hand warm and well perfused.  Range of motion shows about 15 degrees shy of full extension and 115 degrees of flexion.  She lacks about 30 degrees of terminal supination.     Imaging:  I personally reviewed multiple images of the R elbow which shows stable implant.  She has some loss of mineralization to the capitellum but unsure if that is projectional versus true.  The implant is a polished implant so no ingrowth is showing which is as  expected.     Assessment   44 y.o. female post-op from R radial head arthroplasty on 6/10/24.     Plan:  Can WBAT on the RUE and continue ROM as tolerated. I ordered physical therapy again for her to start and for focus on motion.  I will see her back at the 1 year reshma in about 3 months with 2 views of the right elbow.    All of the patient's questions/concerns address and they are in agreement with the plan.

## 2025-05-15 NOTE — PROGRESS NOTES
Medication: Lisinopril 20 mg 1 tab po daily #90 tabs 3 refills passed protocol.   Last office visit date: 11/21/2024  Next appointment scheduled?: Yes  11/6/2025,refilled x1   Rash    HPI:    Length of time Sx have been present - 2 weeks  Rash has gotten unchanged since initially starting  Affected areas - arm and chest, have cats and dogs with ringworm  Inciting events or exposures prior to rash starting? Yes - as above   Pruritic? Yes  Erythematous? Yes  Weeping or drainage? Yes - one lesion on her chest is weeping and draining no warmth   History of Urticaria? No  Fever? No  Painful? No    Review of Systems - General ROS: negative for - chills, fever or night sweats  Respiratory ROS: negative for - shortness of breath, stridor or whee    Encounter Diagnosis   Name Primary?     Tinea Yes      Griseofulvin sent in

## 2025-06-09 ENCOUNTER — HOSPITAL ENCOUNTER (EMERGENCY)
Age: 45
Discharge: HOME | End: 2025-06-10
Payer: COMMERCIAL

## 2025-06-09 ENCOUNTER — APPOINTMENT (OUTPATIENT)
Dept: ULTRASOUND IMAGING | Age: 45
End: 2025-06-09
Payer: COMMERCIAL

## 2025-06-09 ENCOUNTER — APPOINTMENT (OUTPATIENT)
Dept: CT IMAGING | Age: 45
End: 2025-06-09
Payer: COMMERCIAL

## 2025-06-09 VITALS
TEMPERATURE: 97.9 F | HEART RATE: 85 BPM | BODY MASS INDEX: 39.87 KG/M2 | SYSTOLIC BLOOD PRESSURE: 124 MMHG | WEIGHT: 225 LBS | DIASTOLIC BLOOD PRESSURE: 74 MMHG | RESPIRATION RATE: 16 BRPM | OXYGEN SATURATION: 97 % | HEIGHT: 63 IN

## 2025-06-09 DIAGNOSIS — R10.30 LOWER ABDOMINAL PAIN: Primary | ICD-10-CM

## 2025-06-09 LAB
ALBUMIN SERPL BCG-MCNC: 3.9 G/DL (ref 3.4–4.9)
ALP SERPL-CCNC: 105 U/L (ref 38–126)
ALT SERPL W/O P-5'-P-CCNC: 17 U/L (ref 10–35)
ANION GAP SERPL CALC-SCNC: 13 MEQ/L (ref 8–16)
AST SERPL-CCNC: 26 U/L (ref 10–35)
BACTERIA URNS QL MICRO: ABNORMAL /HPF
BASOPHILS ABSOLUTE: 0 THOU/MM3 (ref 0–0.1)
BASOPHILS NFR BLD AUTO: 0.3 %
BILIRUB SERPL-MCNC: < 0.2 MG/DL (ref 0.3–1.2)
BILIRUB UR QL STRIP.AUTO: NEGATIVE
BUN SERPL-MCNC: 10 MG/DL (ref 8–23)
CALCIUM SERPL-MCNC: 8.9 MG/DL (ref 8.6–10)
CASTS #/AREA URNS LPF: ABNORMAL /LPF
CASTS 2: ABNORMAL /LPF
CHARACTER UR: CLEAR
CHLORIDE SERPL-SCNC: 101 MEQ/L (ref 98–111)
CO2 SERPL-SCNC: 24 MEQ/L (ref 22–29)
COLOR, UA: YELLOW
CREAT SERPL-MCNC: 0.8 MG/DL (ref 0.5–0.9)
CRYSTALS URNS MICRO: ABNORMAL
DEPRECATED RDW RBC AUTO: 43.7 FL (ref 35–45)
EOSINOPHIL NFR BLD AUTO: 1.7 %
EOSINOPHILS ABSOLUTE: 0.2 THOU/MM3 (ref 0–0.4)
EPITHELIAL CELLS, UA: ABNORMAL /HPF
ERYTHROCYTE [DISTWIDTH] IN BLOOD BY AUTOMATED COUNT: 14.1 % (ref 11.5–14.5)
GFR SERPL CREATININE-BSD FRML MDRD: > 90 ML/MIN/1.73M2
GLUCOSE SERPL-MCNC: 91 MG/DL (ref 74–109)
GLUCOSE UR QL STRIP.AUTO: NEGATIVE MG/DL
HCG UR QL: NEGATIVE
HCT VFR BLD AUTO: 40.7 % (ref 37–47)
HGB BLD-MCNC: 13.4 GM/DL (ref 12–16)
HGB UR QL STRIP.AUTO: ABNORMAL
IMM GRANULOCYTES # BLD AUTO: 0.06 THOU/MM3 (ref 0–0.07)
IMM GRANULOCYTES NFR BLD AUTO: 0.4 %
KETONES UR QL STRIP.AUTO: NEGATIVE
LIPASE SERPL-CCNC: 21 U/L (ref 13–60)
LYMPHOCYTES ABSOLUTE: 3.9 THOU/MM3 (ref 1–4.8)
LYMPHOCYTES NFR BLD AUTO: 26.6 %
MCH RBC QN AUTO: 28.3 PG (ref 26–33)
MCHC RBC AUTO-ENTMCNC: 32.9 GM/DL (ref 32.2–35.5)
MCV RBC AUTO: 86 FL (ref 81–99)
MISCELLANEOUS 2: ABNORMAL
MONOCYTES ABSOLUTE: 0.6 THOU/MM3 (ref 0.4–1.3)
MONOCYTES NFR BLD AUTO: 3.8 %
NEUTROPHILS ABSOLUTE: 9.7 THOU/MM3 (ref 1.8–7.7)
NEUTROPHILS NFR BLD AUTO: 67.2 %
NITRITE UR QL STRIP: NEGATIVE
NRBC BLD AUTO-RTO: 0 /100 WBC
OSMOLALITY SERPL CALC.SUM OF ELEC: 274.3 MOSMOL/KG (ref 275–300)
PH UR STRIP.AUTO: 6 [PH] (ref 5–9)
PLATELET # BLD AUTO: 371 THOU/MM3 (ref 130–400)
PMV BLD AUTO: 8.8 FL (ref 9.4–12.4)
POTASSIUM SERPL-SCNC: 4 MEQ/L (ref 3.5–5.2)
PROT SERPL-MCNC: 7.4 G/DL (ref 6.4–8.3)
PROT UR STRIP.AUTO-MCNC: NEGATIVE MG/DL
RBC # BLD AUTO: 4.73 MILL/MM3 (ref 4.2–5.4)
RBC URINE: ABNORMAL /HPF
RENAL EPI CELLS #/AREA URNS HPF: ABNORMAL /[HPF]
SODIUM SERPL-SCNC: 138 MEQ/L (ref 135–145)
SP GR UR REFRACT.AUTO: 1.01 (ref 1–1.03)
UROBILINOGEN, URINE: 0.2 EU/DL (ref 0–1)
WBC # BLD AUTO: 14.5 THOU/MM3 (ref 4.8–10.8)
WBC #/AREA URNS HPF: ABNORMAL /HPF
WBC #/AREA URNS HPF: ABNORMAL /[HPF]
YEAST LIKE FUNGI URNS QL MICRO: ABNORMAL

## 2025-06-09 PROCEDURE — 81001 URINALYSIS AUTO W/SCOPE: CPT

## 2025-06-09 PROCEDURE — 96374 THER/PROPH/DIAG INJ IV PUSH: CPT

## 2025-06-09 PROCEDURE — 96375 TX/PRO/DX INJ NEW DRUG ADDON: CPT

## 2025-06-09 PROCEDURE — 76830 TRANSVAGINAL US NON-OB: CPT

## 2025-06-09 PROCEDURE — 74177 CT ABD & PELVIS W/CONTRAST: CPT

## 2025-06-09 PROCEDURE — 81025 URINE PREGNANCY TEST: CPT

## 2025-06-09 PROCEDURE — 6360000002 HC RX W HCPCS: Performed by: NURSE PRACTITIONER

## 2025-06-09 PROCEDURE — 83690 ASSAY OF LIPASE: CPT

## 2025-06-09 PROCEDURE — 93975 VASCULAR STUDY: CPT

## 2025-06-09 PROCEDURE — 80053 COMPREHEN METABOLIC PANEL: CPT

## 2025-06-09 PROCEDURE — 85025 COMPLETE CBC W/AUTO DIFF WBC: CPT

## 2025-06-09 PROCEDURE — 99285 EMERGENCY DEPT VISIT HI MDM: CPT

## 2025-06-09 PROCEDURE — 6360000004 HC RX CONTRAST MEDICATION: Performed by: NURSE PRACTITIONER

## 2025-06-09 PROCEDURE — 36415 COLL VENOUS BLD VENIPUNCTURE: CPT

## 2025-06-09 PROCEDURE — 2580000003 HC RX 258: Performed by: NURSE PRACTITIONER

## 2025-06-09 RX ORDER — ONDANSETRON 2 MG/ML
4 INJECTION INTRAMUSCULAR; INTRAVENOUS ONCE
Status: COMPLETED | OUTPATIENT
Start: 2025-06-09 | End: 2025-06-09

## 2025-06-09 RX ORDER — MORPHINE SULFATE 4 MG/ML
4 INJECTION, SOLUTION INTRAMUSCULAR; INTRAVENOUS ONCE
Refills: 0 | Status: COMPLETED | OUTPATIENT
Start: 2025-06-09 | End: 2025-06-09

## 2025-06-09 RX ORDER — IOPAMIDOL 755 MG/ML
80 INJECTION, SOLUTION INTRAVASCULAR
Status: COMPLETED | OUTPATIENT
Start: 2025-06-09 | End: 2025-06-09

## 2025-06-09 RX ORDER — 0.9 % SODIUM CHLORIDE 0.9 %
1000 INTRAVENOUS SOLUTION INTRAVENOUS ONCE
Status: COMPLETED | OUTPATIENT
Start: 2025-06-09 | End: 2025-06-09

## 2025-06-09 RX ADMIN — SODIUM CHLORIDE 1000 ML: 0.9 INJECTION, SOLUTION INTRAVENOUS at 20:33

## 2025-06-09 RX ADMIN — IOPAMIDOL 80 ML: 755 INJECTION, SOLUTION INTRAVENOUS at 20:54

## 2025-06-09 RX ADMIN — ONDANSETRON 4 MG: 2 INJECTION, SOLUTION INTRAMUSCULAR; INTRAVENOUS at 20:33

## 2025-06-09 RX ADMIN — MORPHINE SULFATE 4 MG: 4 INJECTION, SOLUTION INTRAMUSCULAR; INTRAVENOUS at 20:34

## 2025-06-09 ASSESSMENT — PAIN - FUNCTIONAL ASSESSMENT: PAIN_FUNCTIONAL_ASSESSMENT: 0-10

## 2025-06-09 ASSESSMENT — PAIN DESCRIPTION - LOCATION: LOCATION: ABDOMEN

## 2025-06-09 ASSESSMENT — PAIN SCALES - GENERAL
PAINLEVEL_OUTOF10: 3
PAINLEVEL_OUTOF10: 7
PAINLEVEL_OUTOF10: 7

## 2025-06-09 NOTE — ED TRIAGE NOTES
Pt presents to the ED through lobby with c/o abdominal pain and nausea. Pt states pain started lasted night. Currently rates pain 7 out of 10. States she took ibuprofen this morning around 1000 with little relief. Denies vomiting but states she has been nauseous. States pain radiates throughout her abdomen but is the worst in RLQ. States \"it feels like my uterus is getting squeezed. A lot of pressure\".

## 2025-06-10 PROCEDURE — 96375 TX/PRO/DX INJ NEW DRUG ADDON: CPT

## 2025-06-10 PROCEDURE — 6360000002 HC RX W HCPCS: Performed by: NURSE PRACTITIONER

## 2025-06-10 PROCEDURE — 6370000000 HC RX 637 (ALT 250 FOR IP): Performed by: NURSE PRACTITIONER

## 2025-06-10 RX ORDER — DICYCLOMINE HYDROCHLORIDE 10 MG/1
10 CAPSULE ORAL ONCE
Status: COMPLETED | OUTPATIENT
Start: 2025-06-10 | End: 2025-06-10

## 2025-06-10 RX ORDER — KETOROLAC TROMETHAMINE 30 MG/ML
15 INJECTION, SOLUTION INTRAMUSCULAR; INTRAVENOUS ONCE
Status: COMPLETED | OUTPATIENT
Start: 2025-06-10 | End: 2025-06-10

## 2025-06-10 RX ORDER — DICYCLOMINE HYDROCHLORIDE 10 MG/1
10 CAPSULE ORAL
Qty: 30 CAPSULE | Refills: 0 | Status: SHIPPED | OUTPATIENT
Start: 2025-06-10

## 2025-06-10 RX ADMIN — DICYCLOMINE HYDROCHLORIDE 10 MG: 10 CAPSULE ORAL at 00:16

## 2025-06-10 RX ADMIN — KETOROLAC TROMETHAMINE 15 MG: 30 INJECTION, SOLUTION INTRAMUSCULAR at 00:12

## 2025-06-10 NOTE — ED NOTES
Pt resting on cot at this time. Pt states pain has improved. Pt updated on POC, pt verbalizes understanding. Pt denies any needs at this time. Vitals collected. Pt breathing even and unlabored. Call light in reach.

## 2025-06-10 NOTE — ED NOTES
Pt resting on cot at this time watching tv. Pt denies any needs. Vitals collected. Pt breathing even and unlabored. Call light in reach.   ---

## 2025-06-10 NOTE — ED NOTES
Pt medicated per MAR. Pt resting on cot at this time w/ family at bedside. Pt updated on POC, pt verbalizes understanding. Pt denies any needs at this time. Vitals collected. Pt breathing even and unlabored. Call light in reach. [place] provided.

## 2025-06-10 NOTE — ED PROVIDER NOTES
Given 6/9/25 2034)   ondansetron (ZOFRAN) injection 4 mg (4 mg IntraVENous Given 6/9/25 2033)   sodium chloride 0.9 % bolus 1,000 mL (0 mLs IntraVENous Stopped 6/9/25 2337)   iopamidol (ISOVUE-370) 76 % injection 80 mL (80 mLs IntraVENous Given 6/9/25 2054)   ketorolac (TORADOL) injection 15 mg (15 mg IntraVENous Given 6/10/25 0012)   dicyclomine (BENTYL) capsule 10 mg (10 mg Oral Given 6/10/25 0016)         CONSULTS:  None    PROCEDURES: (None if blank)  Procedures:     CRITICAL CARE: (None if blank)      DISCHARGE PRESCRIPTIONS: (None if blank)  Discharge Medication List as of 6/10/2025 12:10 AM        START taking these medications    Details   dicyclomine (BENTYL) 10 MG capsule Take 1 capsule by mouth 4 times daily (before meals and nightly), Disp-30 capsule, R-0Normal             FINAL IMPRESSION      1. Lower abdominal pain          DISPOSITION/PLAN   DISPOSITION Decision To Discharge 06/10/2025 12:05:55 AM   DISPOSITION CONDITION Stable           OUTPATIENT FOLLOW UP THE PATIENT:  ChowdhuryArcadio APRN - CNP  [id number] [id number]  [phone]    Schedule an appointment as soon as possible for a visit in 2 days  For follow up      REECE Braxton CNP, Kristy J, APRN - CNP  06/10/25 0056

## 2025-06-11 ENCOUNTER — APPOINTMENT (OUTPATIENT)
Dept: RADIOLOGY | Facility: CLINIC | Age: 45
End: 2025-06-11
Payer: COMMERCIAL

## (undated) DEVICE — SOLUTION SCRB 4OZ 4% CHG H2O AIDED FOR PREOPERATIVE SKIN

## (undated) DEVICE — SHEET,DRAPE,3/4,53X77,STERILE: Brand: MEDLINE

## (undated) DEVICE — SOLUTION, IRRIGATION, X RX SODIUM CHL 0.9%, 1000ML BTL

## (undated) DEVICE — GOWN, SURGICAL, SIRUS, NON REINFORCED, LARGE

## (undated) DEVICE — SUTURE, ETHILON, 3-0, 18 IN, PS1, BLACK

## (undated) DEVICE — GLOVE, SURGICAL, PROTEXIS PI ORTHO, 8.0, PF, LF

## (undated) DEVICE — BITE BLOCK, SOFT, MOUTH, 3/4 X 4, LARGE, WHITE

## (undated) DEVICE — PADDING, UNDERCAST, WEBRIL, 4 IN X 4 YD, REG, NS

## (undated) DEVICE — YANKAUER,BULB TIP,W/O VENT,RIGID,STERILE: Brand: MEDLINE

## (undated) DEVICE — DRESSING, NON-ADHERENT, OIL EMULSION, CURITY, 3 X 8 IN, STERILE

## (undated) DEVICE — GLOVE ORANGE PI 7 1/2   MSG9075

## (undated) DEVICE — Device

## (undated) DEVICE — TUBING, SUCTION, 6MM X 10, CLEAN N-COND

## (undated) DEVICE — GOWN,SIRUS,NONRNF,SETINSLV,XL,20/CS: Brand: MEDLINE

## (undated) DEVICE — PADDING, UNDERCAST, WEBRIL, 3 IN X 4 YD, REG, NS

## (undated) DEVICE — PAD,SANITARY,11 IN,MAXI,W/WINGS,N-STRL: Brand: MEDLINE

## (undated) DEVICE — SOLUTION, INJECTION, USP, LACTATED RINGERS, LIFECARE, 1000ML

## (undated) DEVICE — WOUND SYSTEM, DEBRIDEMENT & CLEANING, O.R DUOPAK

## (undated) DEVICE — 31.0MM X 9.0MM SAGITTAL SAW BLADE, LONG MED

## (undated) DEVICE — SOLUTION IV 1000ML 0.9% SOD CHL PH 5 INJ USP VIAFLX PLAS

## (undated) DEVICE — CUFF, TOURNIQUET, 18 X 4, DUAL PORT/SNGL BLADDER, DISP, LF

## (undated) DEVICE — TIP, SUCTION, YANKAUER, FLEXIBLE

## (undated) DEVICE — Y-TYPE TUR/BLADDER IRRIGATION SET, REGULATING CLAMP

## (undated) DEVICE — GLOVE ORANGE PI 8   MSG9080

## (undated) DEVICE — MASK, AURASTRAIGHT, LARYN, SIZE 3

## (undated) DEVICE — MASK, AURASTRAIGHT, LARYN, SIZE 4.0

## (undated) DEVICE — GLOVE ORANGE PI 7   MSG9070

## (undated) DEVICE — SUTURE, CTD, VICRYL, 2-0, UND, BR, CT-2

## (undated) DEVICE — PENCIL, ELECTROSURG, W/BUTTON SWITCH & HOLSTER, EZ CLEAN, DISP

## (undated) DEVICE — Z DISCONTINUED BY MEDLINE USE 2711682 TRAY SKIN PREP DRY W/ PREM GLV

## (undated) DEVICE — PACK,SET UP,NO DRAPES: Brand: MEDLINE

## (undated) DEVICE — MASK, AURASTRAIGHT, LARYN, SIZE 5

## (undated) DEVICE — TOWEL,OR,DSP,ST,BLUE,STD,4/PK,20PK/CS: Brand: MEDLINE

## (undated) DEVICE — GLOVE SURG SZ 6 THK91MIL LTX FREE SYN POLYISOPRENE ANTI

## (undated) DEVICE — PAD,NON-ADHERENT,3X8,STERILE,LF,1/PK: Brand: MEDLINE

## (undated) DEVICE — SUTURE, VICRYL 0, 36 IN, CT-1, VIOLET

## (undated) DEVICE — GLOVE, SURGICAL, PROTEXIS PI , 6.0, PF, LF

## (undated) DEVICE — CATHETER,URETHRAL,REDRUBBER,STRL,14FR: Brand: MEDLINE INDUSTRIES, INC.

## (undated) DEVICE — 10IN STRYKER SMOKE EVACUATION TUBING

## (undated) DEVICE — BLANKET, LOWER BODY, VHA PLUS, ADULT

## (undated) DEVICE — MASK, RESUSCITATION, MANUEL, ADULT

## (undated) DEVICE — STOCKINETTE,  PROTOUCH, COTTON, 4 IN X 25 YD, WHITE

## (undated) DEVICE — DRAPE,UNDERBUTTOCKS,PCH,STERILE: Brand: MEDLINE

## (undated) DEVICE — GOWN,SIRUS,NON REINFRCD,LARGE,SET IN SL: Brand: MEDLINE

## (undated) DEVICE — SURE SET SINGLE BASIN-LF: Brand: MEDLINE INDUSTRIES, INC.

## (undated) DEVICE — TUBING, SUCTION, 1/4" X 12', STRAIGHT: Brand: MEDLINE

## (undated) DEVICE — 22CM X 45CM, LARGE DELUXE ARM SLING W/PAD

## (undated) DEVICE — GAUZE,SPONGE,8"X4",12PLY,XRAY,STRL,LF: Brand: MEDLINE